# Patient Record
Sex: FEMALE | Race: WHITE | Employment: OTHER | URBAN - METROPOLITAN AREA
[De-identification: names, ages, dates, MRNs, and addresses within clinical notes are randomized per-mention and may not be internally consistent; named-entity substitution may affect disease eponyms.]

---

## 2017-08-03 ENCOUNTER — HOSPITAL ENCOUNTER (OUTPATIENT)
Dept: CT IMAGING | Age: 82
Discharge: HOME | End: 2017-08-03
Attending: PSYCHIATRY & NEUROLOGY | Admitting: PSYCHIATRY & NEUROLOGY

## 2017-08-03 DIAGNOSIS — G45.8 OTHER SPECIFIED TRANSIENT CEREBRAL ISCHEMIAS: ICD-10-CM

## 2017-08-03 DIAGNOSIS — R90.89 OTHER ABNORMAL FINDINGS ON DIAGNOSTIC IMAGING OF CENTRAL NERVOUS SYSTEM: ICD-10-CM

## 2017-08-03 LAB
A/G RATIO: 1.2 (ref 1.1–2.2)
ALBUMIN SERPL-MCNC: 4.1 G/DL (ref 3.4–5)
ALP BLD-CCNC: 46 U/L (ref 40–129)
ALT SERPL-CCNC: 13 U/L (ref 10–40)
ANION GAP SERPL CALCULATED.3IONS-SCNC: 12 MMOL/L (ref 3–16)
AST SERPL-CCNC: 18 U/L (ref 15–37)
BILIRUB SERPL-MCNC: 0.5 MG/DL (ref 0–1)
BUN BLDV-MCNC: 20 MG/DL (ref 7–20)
CALCIUM SERPL-MCNC: 9.5 MG/DL (ref 8.3–10.6)
CHLORIDE BLD-SCNC: 101 MMOL/L (ref 99–110)
CO2: 26 MMOL/L (ref 21–32)
CREAT SERPL-MCNC: 0.8 MG/DL (ref 0.6–1.2)
GFR AFRICAN AMERICAN: >60
GFR NON-AFRICAN AMERICAN: >60
GLOBULIN: 3.3 G/DL
GLUCOSE BLD-MCNC: 103 MG/DL (ref 70–99)
POTASSIUM SERPL-SCNC: 4.9 MMOL/L (ref 3.5–5.1)
SODIUM BLD-SCNC: 139 MMOL/L (ref 136–145)
TOTAL PROTEIN: 7.4 G/DL (ref 6.4–8.2)

## 2017-10-04 LAB — CA 125: 37.9 U/ML (ref 0–35)

## 2017-11-27 ENCOUNTER — HOSPITAL ENCOUNTER (OUTPATIENT)
Dept: PREADMISSION TESTING | Age: 82
Discharge: HOME | End: 2017-11-27

## 2017-11-27 VITALS — WEIGHT: 128 LBS | HEIGHT: 66 IN | BODY MASS INDEX: 20.57 KG/M2

## 2017-11-27 LAB
A/G RATIO: 1.2 (ref 1.1–2.2)
ABO/RH: NORMAL
ALBUMIN SERPL-MCNC: 4.2 G/DL (ref 3.4–5)
ALP BLD-CCNC: 53 U/L (ref 40–129)
ALT SERPL-CCNC: 18 U/L (ref 10–40)
ANION GAP SERPL CALCULATED.3IONS-SCNC: 9 MMOL/L (ref 3–16)
ANTIBODY SCREEN: NORMAL
AST SERPL-CCNC: 24 U/L (ref 15–37)
BASOPHILS ABSOLUTE: 0.1 K/UL (ref 0–0.2)
BASOPHILS RELATIVE PERCENT: 1 %
BILIRUB SERPL-MCNC: 0.5 MG/DL (ref 0–1)
BILIRUBIN URINE: NEGATIVE
BLOOD, URINE: NEGATIVE
BUN BLDV-MCNC: 18 MG/DL (ref 7–20)
CA 125: 10.4 U/ML (ref 0–35)
CALCIUM SERPL-MCNC: 9.6 MG/DL (ref 8.3–10.6)
CHLORIDE BLD-SCNC: 103 MMOL/L (ref 99–110)
CLARITY: ABNORMAL
CO2: 32 MMOL/L (ref 21–32)
COLOR: YELLOW
COMMENT UA: NORMAL
CREAT SERPL-MCNC: 0.8 MG/DL (ref 0.6–1.2)
EOSINOPHILS ABSOLUTE: 0.2 K/UL (ref 0–0.6)
EOSINOPHILS RELATIVE PERCENT: 2.8 %
EPITHELIAL CELLS, UA: 1 /HPF (ref 0–5)
GFR AFRICAN AMERICAN: >60
GFR NON-AFRICAN AMERICAN: >60
GLOBULIN: 3.6 G/DL
GLUCOSE BLD-MCNC: 108 MG/DL (ref 70–99)
GLUCOSE URINE: NEGATIVE MG/DL
HCT VFR BLD CALC: 44.4 % (ref 36–48)
HEMOGLOBIN: 14.4 G/DL (ref 12–16)
HYALINE CASTS: 1 /LPF (ref 0–8)
KETONES, URINE: NEGATIVE MG/DL
LEUKOCYTE ESTERASE, URINE: NEGATIVE
LYMPHOCYTES ABSOLUTE: 1 K/UL (ref 1–5.1)
LYMPHOCYTES RELATIVE PERCENT: 16 %
MCH RBC QN AUTO: 29.8 PG (ref 26–34)
MCHC RBC AUTO-ENTMCNC: 32.5 G/DL (ref 31–36)
MCV RBC AUTO: 91.7 FL (ref 80–100)
MICROSCOPIC EXAMINATION: YES
MONOCYTES ABSOLUTE: 0.6 K/UL (ref 0–1.3)
MONOCYTES RELATIVE PERCENT: 9.9 %
NEUTROPHILS ABSOLUTE: 4.6 K/UL (ref 1.7–7.7)
NEUTROPHILS RELATIVE PERCENT: 70.3 %
NITRITE, URINE: NEGATIVE
PDW BLD-RTO: 14.4 % (ref 12.4–15.4)
PH UA: 7
PLATELET # BLD: 209 K/UL (ref 135–450)
PMV BLD AUTO: 9.6 FL (ref 5–10.5)
POTASSIUM SERPL-SCNC: 5 MMOL/L (ref 3.5–5.1)
PROTEIN UA: NEGATIVE MG/DL
RBC # BLD: 4.84 M/UL (ref 4–5.2)
RBC UA: 1 /HPF (ref 0–4)
SODIUM BLD-SCNC: 144 MMOL/L (ref 136–145)
SPECIFIC GRAVITY UA: 1.02
TOTAL PROTEIN: 7.8 G/DL (ref 6.4–8.2)
URINE TYPE: ABNORMAL
UROBILINOGEN, URINE: 0.2 E.U./DL
WBC # BLD: 6.5 K/UL (ref 4–11)
WBC UA: 1 /HPF (ref 0–5)

## 2017-11-27 RX ORDER — MELATONIN
NIGHTLY
COMMUNITY

## 2017-11-27 RX ORDER — LIDOCAINE HYDROCHLORIDE 10 MG/ML
0.5 INJECTION, SOLUTION EPIDURAL; INFILTRATION; INTRACAUDAL; PERINEURAL ONCE
Status: CANCELLED | OUTPATIENT
Start: 2017-12-11

## 2017-11-27 RX ORDER — SODIUM CHLORIDE, SODIUM LACTATE, POTASSIUM CHLORIDE, CALCIUM CHLORIDE 600; 310; 30; 20 MG/100ML; MG/100ML; MG/100ML; MG/100ML
INJECTION, SOLUTION INTRAVENOUS CONTINUOUS
Status: CANCELLED | OUTPATIENT
Start: 2017-12-11

## 2017-11-27 NOTE — PRE-PROCEDURE INSTRUCTIONS
Name_______________________________________Printed:____________________  Date and time of pfvuqad_____[id number]              __Arrival Time:__0700 main__   1. Do not eat or drink anything after 12 midnight (or____hours) prior to surgery. This includes no water, chewing gum or mints. Endoscopy patients follow your doctors bowel prep instructions,which may include taking part of prep after midnight. 2. Take the following pills with a small sip of water on the morning of surgery____________   3. Aspirin, Ibuprofen, Advil, Naproxen, Vitamin E and other Anti-inflammatory products should be stopped for 5 days before surgery or as directed by your physician. 4. Check with your Doctor regarding stopping Plavix, Coumadin,Eliquis, Lovenox,Effient,Pradaxa,Xarelto, Fragmin or other blood thinners and follow their instructions. 5. Do not smoke, and do not drink any alcoholic beverages 24 hours prior to surgery. This includes NA Beer. 6. You may brush your teeth and gargle the morning of surgery. DO NOT SWALLOW WATER   7. You MUST make arrangements for a responsible adult to stay on site while you are here and take you home after your surgery. You will not be allowed to leave alone or drive yourself home. It is strongly suggested someone stay with you the first 24 hrs. Your surgery will be cancelled if you do not have a ride home. 8. A parent/legal guardian must accompany a child scheduled for surgery and plan to stay at the hospital until the child is discharged. Please do not bring other children with you. 9. Please wear simple, loose fitting clothing to the hospital.  Anh Estefanía not bring valuables (money, credit cards, checkbooks, etc.) Do not wear any makeup (including no eye makeup) or nail polish on your fingers or toes. 10. DO NOT wear any jewelry or piercings on day of surgery. All body piercing jewelry must be removed.              11. If you have ___dentures, they will be removed before going to the OR; we will provide you a container. If you wear ___contact lenses or ___glasses, they will be removed; please bring a case for them. 12. Please see your family doctor/pediatrician for a history & physical and/or concerning medications. Bring any test results/reports from your physician's office. PCP__________________Phone___________H&P Appt. Date________             13 If you  have a Living Will and Durable Power of  for Healthcare, please bring in a copy. 15. Notify your Surgeon if you develop any illness between now and surgery  time, cough, cold, fever, sore throat, nausea, vomiting, etc.  Please notify your surgeon if you experience dizziness, shortness of breath or blurred vision between now & the time of your surgery             15. DO NOT shave your operative site 96 hours prior to surgery. For face & neck surgery, men may use an electric razor 48 hours prior to surgery. 16. Shower the night before surgery with _x__Antibacterial soap __x_Hibiclens             17. To provide excellent care visitors will be limited to one in the room at any given time. 18.  Please bring picture ID and insurance card. 19.  Visit our web site for additional information:  [email or web address]              20.During flu season no children under the age of 15 are permitted in the hospital for the safety of all patients. 21. If you take a long acting insulin in the evening only  take half of your usual  dose the night  before your procedure              22. If you use a c-pap please bring DOS if staying overnight,             23.For your convenience [facility] has a pharmacy on site to fill your prescriptions. 24. If you use oxygen and have a portable tank please bring it  with you the DOS             25. Bring a complete list of all your medications with name and dose include any supplements.              26.

## 2017-12-11 PROBLEM — N84.0 UTERINE POLYP: Status: ACTIVE | Noted: 2017-12-11

## 2017-12-11 PROBLEM — N95.0 PMB (POSTMENOPAUSAL BLEEDING): Status: ACTIVE | Noted: 2017-12-11

## 2017-12-11 PROBLEM — N83.209 OVARIAN CYST: Status: ACTIVE | Noted: 2017-12-11

## 2018-03-14 ENCOUNTER — TELEPHONE (OUTPATIENT)
Dept: FAMILY MEDICINE CLINIC | Age: 83
End: 2018-03-14

## 2018-03-14 NOTE — TELEPHONE ENCOUNTER
Pt's spouse was a pt of Dr Rio Child - her primary care is in [place] and is taking an extended leave of absence - she does not want to drive to [place] anymore - spouse had seen Dayton Martínez and she was impressed with her    Would it be possible for her to see Dayton Martínez now?     Please advise

## 2018-04-06 ENCOUNTER — OFFICE VISIT (OUTPATIENT)
Dept: FAMILY MEDICINE CLINIC | Age: 83
End: 2018-04-06

## 2018-04-06 VITALS
HEIGHT: 66 IN | BODY MASS INDEX: 21.31 KG/M2 | HEART RATE: 94 BPM | OXYGEN SATURATION: 98 % | DIASTOLIC BLOOD PRESSURE: 70 MMHG | SYSTOLIC BLOOD PRESSURE: 140 MMHG | WEIGHT: 132.6 LBS

## 2018-04-06 DIAGNOSIS — E78.2 MIXED HYPERLIPIDEMIA: Primary | ICD-10-CM

## 2018-04-06 PROBLEM — N95.0 PMB (POSTMENOPAUSAL BLEEDING): Status: RESOLVED | Noted: 2017-12-11 | Resolved: 2018-04-06

## 2018-04-06 PROBLEM — N83.209 OVARIAN CYST: Status: RESOLVED | Noted: 2017-12-11 | Resolved: 2018-04-06

## 2018-04-06 PROBLEM — N84.0 UTERINE POLYP: Status: RESOLVED | Noted: 2017-12-11 | Resolved: 2018-04-06

## 2018-04-06 PROCEDURE — 99204 OFFICE O/P NEW MOD 45 MIN: CPT | Performed by: PHYSICIAN ASSISTANT

## 2018-04-06 ASSESSMENT — ENCOUNTER SYMPTOMS
VOICE CHANGE: 0
ABDOMINAL PAIN: 0
SHORTNESS OF BREATH: 0
SORE THROAT: 0
CONSTIPATION: 0
DIARRHEA: 0
EYE PAIN: 0
CHEST TIGHTNESS: 0
BACK PAIN: 0
TROUBLE SWALLOWING: 0
COUGH: 0

## 2018-04-06 ASSESSMENT — PATIENT HEALTH QUESTIONNAIRE - PHQ9
1. LITTLE INTEREST OR PLEASURE IN DOING THINGS: 1
2. FEELING DOWN, DEPRESSED OR HOPELESS: 1
SUM OF ALL RESPONSES TO PHQ QUESTIONS 1-9: 2
SUM OF ALL RESPONSES TO PHQ9 QUESTIONS 1 & 2: 2

## 2018-04-23 ENCOUNTER — TELEPHONE (OUTPATIENT)
Dept: FAMILY MEDICINE CLINIC | Age: 83
End: 2018-04-23

## 2018-04-23 DIAGNOSIS — I10 ESSENTIAL HYPERTENSION: Primary | ICD-10-CM

## 2018-04-23 RX ORDER — LISINOPRIL 10 MG/1
10 TABLET ORAL DAILY
Qty: 30 TABLET | Refills: 3 | Status: SHIPPED | OUTPATIENT
Start: 2018-04-23 | End: 2018-08-20 | Stop reason: SDUPTHER

## 2018-05-16 ENCOUNTER — NURSE ONLY (OUTPATIENT)
Dept: FAMILY MEDICINE CLINIC | Age: 83
End: 2018-05-16

## 2018-05-16 VITALS — SYSTOLIC BLOOD PRESSURE: 143 MMHG | DIASTOLIC BLOOD PRESSURE: 50 MMHG | HEART RATE: 59 BPM

## 2018-05-17 ENCOUNTER — TELEPHONE (OUTPATIENT)
Dept: FAMILY MEDICINE CLINIC | Age: 83
End: 2018-05-17

## 2018-05-17 RX ORDER — AMLODIPINE BESYLATE 5 MG/1
5 TABLET ORAL DAILY
Qty: 30 TABLET | Refills: 5 | Status: SHIPPED | OUTPATIENT
Start: 2018-05-17 | End: 2018-09-28

## 2018-07-03 ENCOUNTER — OFFICE VISIT (OUTPATIENT)
Dept: FAMILY MEDICINE CLINIC | Age: 83
End: 2018-07-03

## 2018-07-03 VITALS
TEMPERATURE: 98 F | HEART RATE: 76 BPM | WEIGHT: 136 LBS | SYSTOLIC BLOOD PRESSURE: 124 MMHG | DIASTOLIC BLOOD PRESSURE: 54 MMHG | OXYGEN SATURATION: 98 % | BODY MASS INDEX: 21.95 KG/M2

## 2018-07-03 DIAGNOSIS — N30.00 ACUTE CYSTITIS WITHOUT HEMATURIA: Primary | ICD-10-CM

## 2018-07-03 DIAGNOSIS — K14.6 TONGUE PAIN: ICD-10-CM

## 2018-07-03 LAB
BACTERIA URINE, POC: 0
BILIRUBIN URINE: 1 MG/DL
BLOOD, URINE: POSITIVE
CASTS URINE, POC: 0
CLARITY: CLEAR
COLOR: ABNORMAL
CRYSTALS URINE, POC: 0
EPI CELLS URINE, POC: 0
GLUCOSE URINE: NEGATIVE
KETONES, URINE: POSITIVE
LEUKOCYTE EST, POC: NEGATIVE
NITRITE, URINE: NEGATIVE
PH UA: 5 (ref 4.5–8)
PROTEIN UA: POSITIVE
RBC URINE, POC: ABNORMAL
SPECIFIC GRAVITY UA: 1.02 (ref 1–1.03)
UROBILINOGEN, URINE: NORMAL
WBC URINE, POC: 0
YEAST URINE, POC: 0

## 2018-07-03 PROCEDURE — 81000 URINALYSIS NONAUTO W/SCOPE: CPT | Performed by: FAMILY MEDICINE

## 2018-07-03 PROCEDURE — 99213 OFFICE O/P EST LOW 20 MIN: CPT | Performed by: FAMILY MEDICINE

## 2018-07-03 RX ORDER — CIPROFLOXACIN 500 MG/1
500 TABLET, FILM COATED ORAL 2 TIMES DAILY
Qty: 10 TABLET | Refills: 0 | Status: SHIPPED | OUTPATIENT
Start: 2018-07-03 | End: 2018-07-08

## 2018-07-05 LAB — URINE CULTURE, ROUTINE: NORMAL

## 2018-07-06 ENCOUNTER — TELEPHONE (OUTPATIENT)
Dept: FAMILY MEDICINE CLINIC | Age: 83
End: 2018-07-06

## 2018-07-06 NOTE — TELEPHONE ENCOUNTER
Pt's daughter states pt is still not herself -  she does not feel right \"strange in her brain\"  She is having hip problems but will not take any tylenol does have appt with Ortho on Monday - she has bursitis in her hip. Pt's spouse passed away last September and daughter thinks pt has not really moved on from his death but is not sure she wants to.       Will call after her ortho appt to advise how she is feeling as it is possible this is all due to her hip

## 2018-07-10 NOTE — TELEPHONE ENCOUNTER
Patient states that since she stopped the amlodipine she feels a lot better, her bp :    Sunday   110/61  Monday   125/52  Tuesday  146/66   she has an appt this week and will discuss more at the appointment time.

## 2018-07-13 ENCOUNTER — OFFICE VISIT (OUTPATIENT)
Dept: FAMILY MEDICINE CLINIC | Age: 83
End: 2018-07-13

## 2018-07-13 VITALS
DIASTOLIC BLOOD PRESSURE: 70 MMHG | HEART RATE: 78 BPM | SYSTOLIC BLOOD PRESSURE: 140 MMHG | HEIGHT: 65 IN | BODY MASS INDEX: 21.43 KG/M2 | WEIGHT: 128.6 LBS | OXYGEN SATURATION: 98 %

## 2018-07-13 DIAGNOSIS — R03.0 TRANSIENT ELEVATED BLOOD PRESSURE: Primary | ICD-10-CM

## 2018-07-13 DIAGNOSIS — F32.A DEPRESSION, UNSPECIFIED DEPRESSION TYPE: ICD-10-CM

## 2018-07-13 PROBLEM — N30.00 ACUTE CYSTITIS WITHOUT HEMATURIA: Status: RESOLVED | Noted: 2018-07-03 | Resolved: 2018-07-13

## 2018-07-13 PROBLEM — K14.6 TONGUE PAIN: Status: RESOLVED | Noted: 2018-07-03 | Resolved: 2018-07-13

## 2018-07-13 PROCEDURE — 99213 OFFICE O/P EST LOW 20 MIN: CPT | Performed by: PHYSICIAN ASSISTANT

## 2018-07-13 ASSESSMENT — ENCOUNTER SYMPTOMS
SHORTNESS OF BREATH: 0
ABDOMINAL PAIN: 0
COUGH: 0
BACK PAIN: 0

## 2018-07-13 NOTE — PATIENT INSTRUCTIONS
Vimal Angel was seen today for follow-up. Diagnoses and all orders for this visit:    Transient elevated blood pressure    Depression, unspecified depression type       Continue monitoring BP readings daily and let me know if BP readings stay elevated, >150/90 consistently.

## 2018-07-13 NOTE — PROGRESS NOTES
Subjective:      Patient ID: Ariela Correa is a 80 y.o. female. HPI  Patient is here today to discuss some medications lately. She was in here on 7/3 with confusion, excessive thirst, dry mouth, frequent urination. She has a mild UTI and took the antibiotic and finished it. Urine culture was negative. Then her daughter called the neurologist and they didn't feel it was an issue for them. Her norvasc is newer for her. She has not had the norvasc since 7/5, she has improved somewhat since Monday. She was crying and not making sense on Monday. Her daughter says she has definitely noticed an improvement since stopping the Norvasc. She is also suffering from right hip pain that she is seeing ortho for. She got a steroid injection 4 days ago. Review of Systems   Constitutional: Negative for fatigue and unexpected weight change. HENT: Negative for nosebleeds. Eyes: Negative for visual disturbance. Respiratory: Negative for cough and shortness of breath. Cardiovascular: Negative for chest pain, palpitations and leg swelling. Gastrointestinal: Negative for abdominal pain. Genitourinary: Negative for difficulty urinating. Musculoskeletal: Positive for arthralgias (right hip pain). Negative for back pain. Neurological: Negative for dizziness and headaches. Psychiatric/Behavioral: Negative for confusion and sleep disturbance. The patient is nervous/anxious. Depression       Objective:   Physical Exam   Constitutional: She is oriented to person, place, and time. She appears well-developed and well-nourished. She is cooperative. Slightly elevated BP   Cardiovascular: Normal rate, regular rhythm and normal heart sounds. Pulmonary/Chest: Effort normal and breath sounds normal. No respiratory distress. She has no decreased breath sounds. Neurological: She is alert and oriented to person, place, and time.    Psychiatric: Her speech is normal and behavior is normal. Thought

## 2018-08-20 DIAGNOSIS — I10 ESSENTIAL HYPERTENSION: ICD-10-CM

## 2018-08-21 RX ORDER — LISINOPRIL 10 MG/1
10 TABLET ORAL DAILY
Qty: 30 TABLET | Refills: 3 | Status: SHIPPED | OUTPATIENT
Start: 2018-08-21 | End: 2018-09-28 | Stop reason: SDUPTHER

## 2018-08-22 ENCOUNTER — HOSPITAL ENCOUNTER (OUTPATIENT)
Dept: GENERAL RADIOLOGY | Age: 83
Discharge: HOME | End: 2018-08-22
Payer: COMMERCIAL

## 2018-08-22 DIAGNOSIS — M16.11 ARTHRITIS OF RIGHT HIP: ICD-10-CM

## 2018-08-22 PROCEDURE — 73525 CONTRAST X-RAY OF HIP: CPT

## 2018-08-22 PROCEDURE — 6360000004 HC RX CONTRAST MEDICATION: Performed by: ORTHOPAEDIC SURGERY

## 2018-08-22 PROCEDURE — 2500000003 HC RX 250 WO HCPCS: Performed by: ORTHOPAEDIC SURGERY

## 2018-08-22 PROCEDURE — 6360000002 HC RX W HCPCS: Performed by: ORTHOPAEDIC SURGERY

## 2018-08-22 RX ORDER — BUPIVACAINE HYDROCHLORIDE 2.5 MG/ML
3 INJECTION, SOLUTION EPIDURAL; INFILTRATION; INTRACAUDAL ONCE
Status: DISCONTINUED | OUTPATIENT
Start: 2018-08-22 | End: 2018-08-22

## 2018-08-22 RX ORDER — TRIAMCINOLONE ACETONIDE 40 MG/ML
80 INJECTION, SUSPENSION INTRA-ARTICULAR; INTRAMUSCULAR ONCE
Status: COMPLETED | OUTPATIENT
Start: 2018-08-22 | End: 2018-08-22

## 2018-08-22 RX ORDER — BUPIVACAINE HYDROCHLORIDE 2.5 MG/ML
3 INJECTION, SOLUTION EPIDURAL; INFILTRATION; INTRACAUDAL ONCE
Status: COMPLETED | OUTPATIENT
Start: 2018-08-22 | End: 2018-08-22

## 2018-08-22 RX ORDER — LIDOCAINE HYDROCHLORIDE 10 MG/ML
5 INJECTION, SOLUTION INFILTRATION; PERINEURAL ONCE
Status: COMPLETED | OUTPATIENT
Start: 2018-08-22 | End: 2018-08-22

## 2018-08-22 RX ADMIN — IOVERSOL 10 ML: 636 INJECTION INTRA-ARTERIAL; INTRAVENOUS at 15:15

## 2018-08-22 RX ADMIN — LIDOCAINE HYDROCHLORIDE 5 ML: 10 INJECTION, SOLUTION INFILTRATION; PERINEURAL at 15:37

## 2018-08-22 RX ADMIN — BUPIVACAINE HYDROCHLORIDE 7.5 MG: 2.5 INJECTION, SOLUTION EPIDURAL; INFILTRATION; INTRACAUDAL at 15:30

## 2018-08-22 RX ADMIN — SODIUM BICARBONATE 1.44 MEQ: 0.2 INJECTION, SOLUTION INTRAVENOUS at 15:39

## 2018-08-22 RX ADMIN — TRIAMCINOLONE ACETONIDE 80 MG: 40 INJECTION, SUSPENSION INTRA-ARTICULAR; INTRAMUSCULAR at 15:30

## 2018-09-28 ENCOUNTER — OFFICE VISIT (OUTPATIENT)
Dept: FAMILY MEDICINE CLINIC | Age: 83
End: 2018-09-28
Payer: COMMERCIAL

## 2018-09-28 VITALS
WEIGHT: 130 LBS | DIASTOLIC BLOOD PRESSURE: 70 MMHG | BODY MASS INDEX: 20.98 KG/M2 | OXYGEN SATURATION: 95 % | HEART RATE: 78 BPM | SYSTOLIC BLOOD PRESSURE: 160 MMHG

## 2018-09-28 DIAGNOSIS — I10 ESSENTIAL HYPERTENSION: ICD-10-CM

## 2018-09-28 DIAGNOSIS — E78.2 MIXED HYPERLIPIDEMIA: Primary | ICD-10-CM

## 2018-09-28 PROCEDURE — 99213 OFFICE O/P EST LOW 20 MIN: CPT | Performed by: PHYSICIAN ASSISTANT

## 2018-09-28 RX ORDER — LISINOPRIL 20 MG/1
20 TABLET ORAL DAILY
Qty: 30 TABLET | Refills: 5 | Status: SHIPPED | OUTPATIENT
Start: 2018-09-28 | End: 2019-02-07 | Stop reason: SDUPTHER

## 2018-09-28 RX ORDER — LISINOPRIL 10 MG/1
10 TABLET ORAL DAILY
Qty: 30 TABLET | Refills: 3 | Status: CANCELLED | OUTPATIENT
Start: 2018-09-28

## 2018-09-28 RX ORDER — SIMVASTATIN 10 MG
10 TABLET ORAL NIGHTLY
Qty: 30 TABLET | Refills: 5 | Status: SHIPPED | OUTPATIENT
Start: 2018-09-28 | End: 2019-02-07 | Stop reason: SDUPTHER

## 2018-09-28 ASSESSMENT — ENCOUNTER SYMPTOMS
COUGH: 0
BACK PAIN: 0
SHORTNESS OF BREATH: 0
ABDOMINAL PAIN: 0

## 2018-09-28 NOTE — PROGRESS NOTES
Subjective:      Patient ID: London Sommer is a 80 y.o. female. HPI  Patient is here today because her BP has been elevated for the last month. She said usually it is fine. But she is really stressed out lately getting ready to go to [facility] to see her family. She checks her BP every night at 11:15pm. No chest pain, SOB, dizziness, HA. She sees her cardiologist yearly. It has been 140's-150's/60-70. Review of Systems   Constitutional: Negative for fatigue and unexpected weight change. HENT: Negative for nosebleeds. Eyes: Negative for visual disturbance. Respiratory: Negative for cough and shortness of breath. Cardiovascular: Negative for chest pain, palpitations and leg swelling. Gastrointestinal: Negative for abdominal pain. Musculoskeletal: Negative for back pain. Neurological: Negative for dizziness and headaches. Objective:   Physical Exam   Constitutional: She is oriented to person, place, and time. She appears well-developed and well-nourished. She is cooperative. High BP   Cardiovascular: Normal rate, regular rhythm and normal heart sounds. Pulmonary/Chest: Effort normal and breath sounds normal. No respiratory distress. She has no decreased breath sounds. Neurological: She is alert and oriented to person, place, and time. Assessment:      Shalonda Thorne was seen today for follow-up. Diagnoses and all orders for this visit:    Mixed hyperlipidemia  -     simvastatin (ZOCOR) 10 MG tablet; Take 1 tablet by mouth nightly    Essential hypertension  -     lisinopril (PRINIVIL;ZESTRIL) 20 MG tablet; Take 1 tablet by mouth daily    Other orders  -     Cancel: lisinopril (PRINIVIL;ZESTRIL) 10 MG tablet; Take 1 tablet by mouth daily             Plan:      Increase Lisinopril to 20mg, call with any concerns.          Yo Roberts

## 2018-09-28 NOTE — PATIENT INSTRUCTIONS
Mitul Sanchez was seen today for follow-up. Diagnoses and all orders for this visit:    Mixed hyperlipidemia  -     simvastatin (ZOCOR) 10 MG tablet; Take 1 tablet by mouth nightly    Essential hypertension  -     lisinopril (PRINIVIL;ZESTRIL) 20 MG tablet; Take 1 tablet by mouth daily    Other orders  -     Cancel: lisinopril (PRINIVIL;ZESTRIL) 10 MG tablet; Take 1 tablet by mouth daily       Increase lisinopril to 20mg daily. Call next week if your BP is getting too low.

## 2018-10-05 ENCOUNTER — TELEPHONE (OUTPATIENT)
Dept: FAMILY MEDICINE CLINIC | Age: 83
End: 2018-10-05

## 2019-02-07 ENCOUNTER — OFFICE VISIT (OUTPATIENT)
Dept: FAMILY MEDICINE CLINIC | Age: 84
End: 2019-02-07
Payer: COMMERCIAL

## 2019-02-07 VITALS
SYSTOLIC BLOOD PRESSURE: 122 MMHG | TEMPERATURE: 99.2 F | HEART RATE: 61 BPM | BODY MASS INDEX: 21.66 KG/M2 | WEIGHT: 134.2 LBS | DIASTOLIC BLOOD PRESSURE: 68 MMHG | OXYGEN SATURATION: 94 %

## 2019-02-07 DIAGNOSIS — I10 ESSENTIAL HYPERTENSION: ICD-10-CM

## 2019-02-07 DIAGNOSIS — N39.0 URINARY TRACT INFECTION WITHOUT HEMATURIA, SITE UNSPECIFIED: Primary | ICD-10-CM

## 2019-02-07 DIAGNOSIS — E78.2 MIXED HYPERLIPIDEMIA: ICD-10-CM

## 2019-02-07 LAB
BACTERIA URINE, POC: ABNORMAL
BILIRUBIN URINE: 0 MG/DL
BLOOD, URINE: POSITIVE
CASTS URINE, POC: 0
CLARITY: CLEAR
COLOR: YELLOW
CRYSTALS URINE, POC: 0
EPI CELLS URINE, POC: 0
GLUCOSE URINE: NEGATIVE
KETONES, URINE: NEGATIVE
LEUKOCYTE EST, POC: ABNORMAL
NITRITE, URINE: POSITIVE
PH UA: 6.5 (ref 4.5–8)
PROTEIN UA: POSITIVE
RBC URINE, POC: ABNORMAL
SPECIFIC GRAVITY UA: 1.03 (ref 1–1.03)
UROBILINOGEN, URINE: NORMAL
WBC URINE, POC: ABNORMAL
YEAST URINE, POC: 0

## 2019-02-07 PROCEDURE — 99213 OFFICE O/P EST LOW 20 MIN: CPT | Performed by: FAMILY MEDICINE

## 2019-02-07 PROCEDURE — 81000 URINALYSIS NONAUTO W/SCOPE: CPT | Performed by: FAMILY MEDICINE

## 2019-02-07 RX ORDER — LISINOPRIL 20 MG/1
20 TABLET ORAL DAILY
Qty: 30 TABLET | Refills: 5 | Status: ON HOLD | OUTPATIENT
Start: 2019-02-07 | End: 2019-05-01 | Stop reason: CLARIF

## 2019-02-07 RX ORDER — SULFAMETHOXAZOLE AND TRIMETHOPRIM 800; 160 MG/1; MG/1
1 TABLET ORAL 2 TIMES DAILY
Qty: 10 TABLET | Refills: 0 | Status: SHIPPED | OUTPATIENT
Start: 2019-02-07 | End: 2019-02-12

## 2019-02-07 RX ORDER — NITROFURANTOIN 25; 75 MG/1; MG/1
100 CAPSULE ORAL 2 TIMES DAILY
Qty: 14 CAPSULE | Refills: 0 | Status: SHIPPED | OUTPATIENT
Start: 2019-02-07 | End: 2019-02-07

## 2019-02-07 RX ORDER — SIMVASTATIN 10 MG
10 TABLET ORAL NIGHTLY
Qty: 30 TABLET | Refills: 5 | Status: SHIPPED | OUTPATIENT
Start: 2019-02-07 | End: 2019-09-27 | Stop reason: SDUPTHER

## 2019-02-07 ASSESSMENT — PATIENT HEALTH QUESTIONNAIRE - PHQ9
2. FEELING DOWN, DEPRESSED OR HOPELESS: 1
SUM OF ALL RESPONSES TO PHQ9 QUESTIONS 1 & 2: 2
SUM OF ALL RESPONSES TO PHQ QUESTIONS 1-9: 2
1. LITTLE INTEREST OR PLEASURE IN DOING THINGS: 1
SUM OF ALL RESPONSES TO PHQ QUESTIONS 1-9: 2

## 2019-02-07 ASSESSMENT — ENCOUNTER SYMPTOMS: ABDOMINAL PAIN: 0

## 2019-02-10 LAB
ORGANISM: ABNORMAL
URINE CULTURE, ROUTINE: ABNORMAL
URINE CULTURE, ROUTINE: ABNORMAL

## 2019-02-18 ENCOUNTER — OFFICE VISIT (OUTPATIENT)
Dept: ORTHOPEDIC SURGERY | Age: 84
End: 2019-02-18
Payer: COMMERCIAL

## 2019-02-18 VITALS — WEIGHT: 130 LBS | BODY MASS INDEX: 20.89 KG/M2 | HEIGHT: 66 IN

## 2019-02-18 DIAGNOSIS — M16.11 ARTHRITIS OF RIGHT HIP: Primary | ICD-10-CM

## 2019-02-18 DIAGNOSIS — M25.551 PAIN OF RIGHT HIP JOINT: ICD-10-CM

## 2019-02-18 PROCEDURE — 99213 OFFICE O/P EST LOW 20 MIN: CPT | Performed by: ORTHOPAEDIC SURGERY

## 2019-02-25 ENCOUNTER — HOSPITAL ENCOUNTER (OUTPATIENT)
Dept: GENERAL RADIOLOGY | Age: 84
Discharge: HOME | End: 2019-02-25
Payer: MEDICARE

## 2019-02-25 DIAGNOSIS — M16.11 OSTEOARTHRITIS OF RIGHT HIP, UNSPECIFIED OSTEOARTHRITIS TYPE: ICD-10-CM

## 2019-02-25 PROCEDURE — 2500000003 HC RX 250 WO HCPCS: Performed by: ORTHOPAEDIC SURGERY

## 2019-02-25 PROCEDURE — 20610 DRAIN/INJ JOINT/BURSA W/O US: CPT

## 2019-02-25 PROCEDURE — 6360000002 HC RX W HCPCS: Performed by: ORTHOPAEDIC SURGERY

## 2019-02-25 PROCEDURE — 6360000004 HC RX CONTRAST MEDICATION: Performed by: ORTHOPAEDIC SURGERY

## 2019-02-25 RX ORDER — LIDOCAINE HYDROCHLORIDE 10 MG/ML
5 INJECTION, SOLUTION INFILTRATION; PERINEURAL ONCE
Status: COMPLETED | OUTPATIENT
Start: 2019-02-25 | End: 2019-02-25

## 2019-02-25 RX ORDER — BUPIVACAINE HYDROCHLORIDE 2.5 MG/ML
3 INJECTION, SOLUTION EPIDURAL; INFILTRATION; INTRACAUDAL ONCE
Status: COMPLETED | OUTPATIENT
Start: 2019-02-25 | End: 2019-02-25

## 2019-02-25 RX ORDER — TRIAMCINOLONE ACETONIDE 40 MG/ML
80 INJECTION, SUSPENSION INTRA-ARTICULAR; INTRAMUSCULAR ONCE
Status: COMPLETED | OUTPATIENT
Start: 2019-02-25 | End: 2019-02-25

## 2019-02-25 RX ADMIN — IOVERSOL 5 ML: 636 INJECTION INTRA-ARTERIAL; INTRAVENOUS at 15:14

## 2019-02-25 RX ADMIN — TRIAMCINOLONE ACETONIDE 80 MG: 40 INJECTION, SUSPENSION INTRA-ARTICULAR; INTRAMUSCULAR at 15:16

## 2019-02-25 RX ADMIN — BUPIVACAINE HYDROCHLORIDE 7.5 MG: 2.5 INJECTION, SOLUTION EPIDURAL; INFILTRATION; INTRACAUDAL; PERINEURAL at 15:14

## 2019-02-25 RX ADMIN — LIDOCAINE HYDROCHLORIDE 5 ML: 10 INJECTION, SOLUTION EPIDURAL; INFILTRATION; INTRACAUDAL; PERINEURAL at 15:15

## 2019-02-25 ASSESSMENT — PAIN SCALES - GENERAL
PAINLEVEL_OUTOF10: 4
PAINLEVEL_OUTOF10: 5

## 2019-04-11 ENCOUNTER — APPOINTMENT (OUTPATIENT)
Dept: GENERAL RADIOLOGY | Age: 84
End: 2019-04-11
Payer: MEDICARE

## 2019-04-11 ENCOUNTER — APPOINTMENT (OUTPATIENT)
Dept: CT IMAGING | Age: 84
End: 2019-04-11
Payer: MEDICARE

## 2019-04-11 ENCOUNTER — HOSPITAL ENCOUNTER (EMERGENCY)
Age: 84
Discharge: HOME | End: 2019-04-11
Attending: EMERGENCY MEDICINE
Payer: MEDICARE

## 2019-04-11 VITALS
BODY MASS INDEX: 19.37 KG/M2 | TEMPERATURE: 98.5 F | HEART RATE: 95 BPM | WEIGHT: 120 LBS | SYSTOLIC BLOOD PRESSURE: 126 MMHG | DIASTOLIC BLOOD PRESSURE: 61 MMHG | RESPIRATION RATE: 16 BRPM | OXYGEN SATURATION: 100 %

## 2019-04-11 DIAGNOSIS — M25.551 RIGHT HIP PAIN: Primary | ICD-10-CM

## 2019-04-11 PROCEDURE — 73502 X-RAY EXAM HIP UNI 2-3 VIEWS: CPT

## 2019-04-11 PROCEDURE — 73700 CT LOWER EXTREMITY W/O DYE: CPT

## 2019-04-11 PROCEDURE — 6370000000 HC RX 637 (ALT 250 FOR IP): Performed by: EMERGENCY MEDICINE

## 2019-04-11 PROCEDURE — 6370000000 HC RX 637 (ALT 250 FOR IP): Performed by: PHYSICIAN ASSISTANT

## 2019-04-11 PROCEDURE — 99283 EMERGENCY DEPT VISIT LOW MDM: CPT

## 2019-04-11 RX ORDER — NAPROXEN 250 MG/1
500 TABLET ORAL ONCE
Status: COMPLETED | OUTPATIENT
Start: 2019-04-11 | End: 2019-04-11

## 2019-04-11 RX ORDER — OXYCODONE HYDROCHLORIDE AND ACETAMINOPHEN 5; 325 MG/1; MG/1
1 TABLET ORAL ONCE
Status: COMPLETED | OUTPATIENT
Start: 2019-04-11 | End: 2019-04-11

## 2019-04-11 RX ORDER — NAPROXEN 500 MG/1
500 TABLET ORAL 2 TIMES DAILY
Qty: 20 TABLET | Refills: 0 | Status: SHIPPED | OUTPATIENT
Start: 2019-04-11 | End: 2019-04-23

## 2019-04-11 RX ORDER — ONDANSETRON 4 MG/1
4 TABLET, ORALLY DISINTEGRATING ORAL ONCE
Status: COMPLETED | OUTPATIENT
Start: 2019-04-11 | End: 2019-04-11

## 2019-04-11 RX ORDER — METHYLPREDNISOLONE 4 MG/1
4 TABLET ORAL SEE ADMIN INSTRUCTIONS
Qty: 1 KIT | Refills: 0 | Status: SHIPPED | OUTPATIENT
Start: 2019-04-12 | End: 2019-04-18

## 2019-04-11 RX ADMIN — OXYCODONE AND ACETAMINOPHEN 1 TABLET: 5; 325 TABLET ORAL at 14:32

## 2019-04-11 RX ADMIN — PREDNISONE 50 MG: 20 TABLET ORAL at 15:12

## 2019-04-11 RX ADMIN — NAPROXEN 500 MG: 250 TABLET ORAL at 15:12

## 2019-04-11 RX ADMIN — ONDANSETRON 4 MG: 4 TABLET, ORALLY DISINTEGRATING ORAL at 14:32

## 2019-04-11 ASSESSMENT — PAIN SCALES - GENERAL
PAINLEVEL_OUTOF10: 7
PAINLEVEL_OUTOF10: 9

## 2019-04-11 ASSESSMENT — ENCOUNTER SYMPTOMS
SHORTNESS OF BREATH: 0
NAUSEA: 0
VOMITING: 0

## 2019-04-11 NOTE — ED PROVIDER NOTES
[address] [facility]  eMERGENCY dEPARTMENT eNCOUnter        Pt Name: Sherryle Lame  MRN: [id number]  Masoudgftrae 1934  Date of evaluation: 4/11/2019  Provider: Mahesh Taylor PA-C  PCP: MOLLY Lin    This patient was seen and evaluated by the attending physician Izzy Amor III, [address]       Chief Complaint   Patient presents with    Hip Pain     Pt reporting chronic pain to R hip x several years, pain worse over last 6 weeks - pt has appt with ortho tomorrow       HISTORY OF PRESENT ILLNESS   (Location/Symptom, Timing/Onset, Context/Setting, Quality, Duration, Modifying Factors, Severity)  Note limiting factors. Sherryle Lame is a 80 y.o. female who presents to the emergency department today for evaluation for right hip pain. The patient states that she's been experiencing chronic pain to her right hip for the past several years but she has been experiencing increasing pain to her right hip and this is been ongoing for over one month. The patient states that she does see Dr. Girma Dodd, orthopedics, she states that she actually has an appointment tomorrow but she states that she has had progressively worsening pain, and she states that she is in too much pain to make it to her appointment tomorrow. She is rating her pain as a 7/10, pain is worse with touch and movement. Patient states that she has difficulty walking around at home secondary to the pain, and she states that she does live alone. Patient denies falling or injuring her hip in any way. She states that over a month ago she did have an injection into her right hip. She denies any fever or chills. She denies any nausea or vomiting. No numbness, tingling or weakness. She has no other complaints. Nursing Notes were all reviewed and agreed with or any disagreements were addressed  in the HPI.     REVIEW OF SYSTEMS    (2-9 systems for level 4, 10 or more for level 5) Review of Systems   Constitutional: Negative for activity change, appetite change, chills and fever. Respiratory: Negative for shortness of breath. Cardiovascular: Negative for chest pain. Gastrointestinal: Negative for nausea and vomiting. Musculoskeletal: Positive for arthralgias. Skin: Negative for wound. Neurological: Negative for weakness and numbness. Positives and Pertinent negatives as per HPI. Except as noted abovein the ROS, all other systems were reviewed and negative. PAST MEDICAL HISTORY     Past Medical History:   Diagnosis Date    Hypercholesteremia          SURGICAL HISTORY     Past Surgical History:   Procedure Laterality Date    APPENDECTOMY      JOINT REPLACEMENT      left    SALPINGO-OOPHORECTOMY  12/11/2017    robotic, and hysteroscopy d&c, polypectomy          CURRENTMEDICATIONS       Previous Medications    ASPIRIN 81 MG TABLET    Take 81 mg by mouth daily. CHOLECALCIFEROL (VITAMIN D3) 1000 UNITS TABS    Take by mouth    LISINOPRIL (PRINIVIL;ZESTRIL) 20 MG TABLET    Take 1 tablet by mouth daily    MAGNESIUM HYDROXIDE (MAGNESIA PO)    Take by mouth    SIMVASTATIN (ZOCOR) 10 MG TABLET    Take 1 tablet by mouth nightly         ALLERGIES     Oxycodone and Norvasc [amlodipine besylate]    FAMILYHISTORY     No family history on file. SOCIAL HISTORY       Social History     Socioeconomic History    Marital status:       Spouse name: Not on file    Number of children: Not on file    Years of education: Not on file    Highest education level: Not on file   Occupational History    Not on file   Social Needs    Financial resource strain: Not on file    Food insecurity:     Worry: Not on file     Inability: Not on file    Transportation needs:     Medical: Not on file     Non-medical: Not on file   Tobacco Use    Smoking status: Never Smoker    Smokeless tobacco: Never Used   Substance and Sexual Activity    Alcohol use: No    Drug use: No    Sexual activity: Not on file   Lifestyle    Physical activity:     Days per week: Not on file     Minutes per session: Not on file    Stress: Not on file   Relationships    Social connections:     Talks on phone: Not on file     Gets together: Not on file     Attends Rastafarian service: Not on file     Active member of club or organization: Not on file     Attends meetings of clubs or organizations: Not on file     Relationship status: Not on file    Intimate partner violence:     Fear of current or ex partner: Not on file     Emotionally abused: Not on file     Physically abused: Not on file     Forced sexual activity: Not on file   Other Topics Concern    Not on file   Social History Narrative    Not on file       SCREENINGS             PHYSICAL EXAM    (up to 7 for level 4, 8 or more for level 5)     ED Triage Vitals [04/11/19 1329]   BP Temp Temp src Pulse Resp SpO2 Height Weight   126/61 98.5 °F (36.9 °C) -- 95 16 100 % -- 120 lb (54.4 kg)       Physical Exam   Constitutional: She is oriented to person, place, and time. She appears well-developed and well-nourished. HENT:   Head: Normocephalic and atraumatic. Right Ear: External ear normal.   Left Ear: External ear normal.   Nose: Nose normal.   Eyes: Right eye exhibits no discharge. Left eye exhibits no discharge. Neck: Normal range of motion. Neck supple. No tracheal deviation present. Cardiovascular: Intact distal pulses. Pulmonary/Chest: Effort normal. No respiratory distress. Musculoskeletal: Normal range of motion. There is diffuse tenderness over the right anterior hip. There is no overlying erythema, edema, ecchymosis or more. She has full range of motion of the hip but does experience pain. Dorsalis pedis and posterior tibialis pulse are 2+. Normal sensation light touch. Neurovascularly intact. Neurological: She is alert and oriented to person, place, and time. Skin: Skin is warm and dry. She is not diaphoretic. Psychiatric: She has a normal mood and affect. Her behavior is normal.   Nursing note and vitals reviewed. DIAGNOSTIC RESULTS   LABS:    Labs Reviewed - No data to display    All other labs were within normal range or not returned as of this dictation. EKG: All EKG's are interpreted by the Emergency Department Physician who either signs orCo-signs this chart in the absence of a cardiologist.  Please see their note for interpretation of EKG. RADIOLOGY:   Non-plain film images such as CT, Ultrasound and MRI are read by the radiologist. Plain radiographic images are visualized andpreliminarily interpreted by the  ED Provider with the below findings:        Interpretation perthe Radiologist below, if available at the time of this note:    CT HIP RIGHT WO CONTRAST   Final Result   Advanced right hip osteoarthrosis. No fracture. XR HIP RIGHT (2-3 VIEWS)   Final Result   Advanced osteoarthritis of the right hip with no acute abnormality   demonstrated           Xr Hip Right (2-3 Views)    Result Date: 4/11/2019  EXAMINATION: 2 XRAY VIEWS OF THE RIGHT HIP 4/11/2019 1:47 pm COMPARISON: None. HISTORY: ORDERING SYSTEM PROVIDED HISTORY: pain TECHNOLOGIST PROVIDED HISTORY: Reason for exam:->pain Ordering Physician Provided Reason for Exam: Hip Pain (Pt reporting chronic pain to R hip x several years, pain worse over last 6 weeks - pt has appt with ortho tomorrow) Acuity: Chronic Type of Exam: Ongoing FINDINGS: No evidence of fracture or dislocation. There is advanced osteoarthritis of the right hip joint with greatest loss of joint space at the superior margin with bone to bone apposition. There is slight flattening of the superior articular surface of the femoral head with subchondral sclerosis. There is osteophyte formation at the femoral head-neck junction. There has been left hip arthroplasty with good alignment of the prosthetic components.   No abnormality of the remainder of the pelvis is demonstrated     Advanced osteoarthritis of the right hip with no acute abnormality demonstrated     Ct Hip Right Wo Contrast    Result Date: 4/11/2019  EXAMINATION: CT OF THE RIGHT HIP WITHOUT CONTRAST 4/11/2019 2:13 pm TECHNIQUE: CT of the right hip was performed without the administration of intravenous contrast.  Multiplanar reformatted images are provided for review. Dose modulation, iterative reconstruction, and/or weight based adjustment of the mA/kV was utilized to reduce the radiation dose to as low as reasonably achievable. COMPARISON: None. HISTORY ORDERING SYSTEM PROVIDED HISTORY: pain TECHNOLOGIST PROVIDED HISTORY: Ordering Physician Provided Reason for Exam: pain Acuity: Acute Type of Exam: Initial FINDINGS: Bones: No acute fracture. Left hip arthroplasty. No evidence of dislocation. No findings to suggest hardware loosening. Soft Tissue:  Appendix is normal.  Colonic diverticulosis. No acute diverticulitis. No intrapelvic fluid collection. No soft tissue fluid collection. Joint:  Advanced right hip osteoarthrosis. Severe joint space narrowing and subchondral cystic changes as well as sclerosis with prominent marginal osteophytes. Advanced right hip osteoarthrosis. No fracture.          PROCEDURES   Unless otherwise noted below, none     Procedures    CRITICAL CARE TIME   N/A    CONSULTS:  IP CONSULT TO SOCIAL WORK      EMERGENCY DEPARTMENT COURSE and DIFFERENTIALDIAGNOSIS/MDM:   Vitals:    Vitals:    04/11/19 1329   BP: 126/61   Pulse: 95   Resp: 16   Temp: 98.5 °F (36.9 °C)   SpO2: 100%   Weight: 120 lb (54.4 kg)       Patient was given thefollowing medications:  Medications   oxyCODONE-acetaminophen (PERCOCET) 5-325 MG per tablet 1 tablet (1 tablet Oral Given 4/11/19 1432)   ondansetron (ZOFRAN-ODT) disintegrating tablet 4 mg (4 mg Oral Given 4/11/19 1432)   naproxen (NAPROSYN) tablet 500 mg (500 mg Oral Given 4/11/19 1512)   predniSONE (DELTASONE) tablet 50 mg (50 mg Oral Given 4/11/19 occasionally words are mis-transcribed.)    Alyssa England PA-C (electronically signed)            Alyssa England PA-C  04/11/19 1130

## 2019-04-11 NOTE — ED PROVIDER NOTES
I independently performed a history and physical on Manpreet Puckett. All diagnostic, treatment, and disposition decisions were made by myself in conjunction with the advanced practice provider. Briefly, this is a 80 y.o. female here for right hip pain. This is ongoing for several years. Worse in the past few days. She has an appointment tomorrow with orthopedic doctor. She is not really taking any medication at home including NSAIDs or narcotics. She has had an issue with narcotics in the past apparently causing severe depression. There's been no new injury or trauma. Family is concerned that her pain associated she is not ambulating around as much. And she lives by herself. There is no fevers or chills. Pain is sharp, severe at times, radiating throughout the right hip region. .  See FELIPE note      On exam:  Constitutional:  Well developed, well nourished, non-toxic appearance   Eyes:  PERRL, conjunctiva normal   HENT:  Atraumatic, external ears normal, nosenormal, oropharynx moist, no pharyngeal exudates. Neck- normal range of motion, supple   Respiratory:  No respiratory distress, normal breath sounds, no rales, no wheezing   Cardiovascular:  Normal rate, normal rhythm, no murmurs,   GI:  Soft, nondistended, nontender, no obvious organomegaly, no mass, no rebound, no guarding   Musculoskeletal:  No tenderness, no deformities. Hip is only mildly tender on passive range of motion. He did have the patient stand and attempt ambulation she did have pain to the right hip on weightbearing. There is no tender to other parts of the pelvis  Integument:  no rashes on exposed surfaces  Neurologic:  Alert & oriented x 3,  no focal deficits noted   Psychiatric:  Speech and behavior appropriate. No agitation. MDM    I did discuss the case with Blake Ahuja, who is the assistant to Dr. Leo Montiel of orthopedics. She has an established relationship with him as an appointment tomorrow.   Patient was very

## 2019-04-11 NOTE — ED NOTES
Bed: 12  Expected date:   Expected time:   Means of arrival: [facility]  Comments:  [place] [facility]  04/11/19 5550

## 2019-04-12 ENCOUNTER — OFFICE VISIT (OUTPATIENT)
Dept: ORTHOPEDIC SURGERY | Age: 84
End: 2019-04-12
Payer: MEDICARE

## 2019-04-12 VITALS
DIASTOLIC BLOOD PRESSURE: 60 MMHG | HEIGHT: 66 IN | BODY MASS INDEX: 20.89 KG/M2 | WEIGHT: 130 LBS | SYSTOLIC BLOOD PRESSURE: 107 MMHG

## 2019-04-12 DIAGNOSIS — M70.61 TROCHANTERIC BURSITIS OF RIGHT HIP: ICD-10-CM

## 2019-04-12 DIAGNOSIS — M16.11 ARTHRITIS OF RIGHT HIP: ICD-10-CM

## 2019-04-12 PROCEDURE — 99213 OFFICE O/P EST LOW 20 MIN: CPT | Performed by: ORTHOPAEDIC SURGERY

## 2019-04-12 NOTE — PROGRESS NOTES
current or ex partner: None        Emotionally abused: None        Physically abused: None        Forced sexual activity: None    Other Topics      Concerns:        None    Social History Narrative      None      Current Outpatient Medications:  naproxen (NAPROSYN) 500 MG tablet, Take 1 tablet by mouth 2 times daily, Disp: 20 tablet, Rfl: 0  methylPREDNISolone (MEDROL, SEDRICK,) 4 MG tablet, Take 1 tablet by mouth See Admin Instructions for 6 days Take by mouth., Disp: 1 kit, Rfl: 0  simvastatin (ZOCOR) 10 MG tablet, Take 1 tablet by mouth nightly, Disp: 30 tablet, Rfl: 5  lisinopril (PRINIVIL;ZESTRIL) 20 MG tablet, Take 1 tablet by mouth daily, Disp: 30 tablet, Rfl: 5  Cholecalciferol (VITAMIN D3) 1000 units TABS, Take by mouth, Disp: , Rfl:   Magnesium Hydroxide (MAGNESIA PO), Take by mouth, Disp: , Rfl:   aspirin 81 MG tablet, Take 81 mg by mouth daily. , Disp: , Rfl:     No current facility-administered medications for this visit. -- Oxycodone -- Other (See Comments)    --  Depression   -- Norvasc [Amlodipine Besylate] -- Anxiety    VITAL SIGNS:  /60   Ht 5' 6\" (1.676 m)   Wt 130 lb (59 kg)   BMI 20.98 kg/m²   The patient is alert and oriented. She answers questions appropriately. Her much anymore range of motion hip is causes pain like to get her flexed about 30° before she starts having pain and I can do a log roll she does not have a lot of pain. She says is mainly when she is weightbearing. She has no swelling of the right lower extremity. She appears of normal sensibility. I reviewed x-rays from her emergency department visit yesterday and agreed that it appeared to be no sudden collapse fracture or dislocation. Impression: Right hip osteoarthritis. Plan: We will try to get him scheduled for right total hip arthroplasty patient to be cleared by cardiology and and probably neurology.   We did discuss that the seizure disorder can lead to problems after hip replacement such as dislocations or fractures. complains of pain/discomfort

## 2019-04-15 ENCOUNTER — TELEPHONE (OUTPATIENT)
Dept: ORTHOPEDIC SURGERY | Age: 84
End: 2019-04-15

## 2019-04-15 NOTE — TELEPHONE ENCOUNTER
Please call patient saw pcp got steroids states her feet are swollen  Please call daughter to advise.

## 2019-04-15 NOTE — PROGRESS NOTES
[facility] / [facility] ([facility]) Mario Alvarez, [address]    Acknowledgment of Informed Consent for Surgical or Medical Procedure and Sedation  I agree to allow doctor(s) [facility] and his/her associates or assistants, including residents and/or other qualified medical practitioner to perform the following medical treatment or procedure and to administer or direct the administration of sedation as necessary:  Procedure(s): HIP TOTAL ARTHROPLASTY  My doctor has explained the following regarding the proposed procedure:   the explanation of the procedure   the benefits of the procedure   the potential problems that might occur during recuperation   the risks and side effects of the procedure which could include but are not limited to severe blood loss, infection, stroke or death   the benefits, risks and side effect of alternative procedures including the consequences of declining this procedure or any alternative procedures   the likelihood of achieving satisfactory results. I acknowledge no guarantee or assurance has been made to me regarding the results. I understand that during the course of this treatment/procedure, unforeseen conditions can occur which require an additional or different procedure. I agree to allow my physician or assistants to perform such extension of the original procedure as they may find necessary. I understand that sedation will often result in temporary impairment of memory and fine motor skills and that sedation can occasionally progress to a state of deep sedation or general anesthesia. I understand the risks of anesthesia for surgery include, but are not limited to, sore throat, hoarseness, injury to face, mouth, or teeth; nausea; headache; injury to blood vessels or nerves; death, brain damage, or paralysis.     I understand that if I have a Limitation of Treatment order in effect during my hospitalization, the order may or may not be in effect during this procedure. I give my doctor permission to give me blood or blood products. I understand that there are risks with receiving blood such as hepatitis, AIDS, fever, or allergic reaction. I acknowledge that the risks, benefits, and alternatives of this treatment have been explained to me and that no express or implied warranty has been given by the hospital, any blood bank, or any person or entity as to the blood or blood components transfused. At the discretion of my doctor, I agree to allow observers, equipment/product representatives and allow photographing, and/or televising of the procedure, provided my name or identity is maintained confidentially. I agree the hospital may dispose of or use for scientific or educational purposes any tissue, fluid, or body parts which may be removed.     ________________________________Date________Time______ am/pm  ([address])  Patient or Signature of Closest Relative or Legal Guardian    ________________________________Date________Time______am/pm      Page 1 of  1  Witness

## 2019-04-19 RX ORDER — SODIUM CHLORIDE, SODIUM LACTATE, POTASSIUM CHLORIDE, CALCIUM CHLORIDE 600; 310; 30; 20 MG/100ML; MG/100ML; MG/100ML; MG/100ML
INJECTION, SOLUTION INTRAVENOUS CONTINUOUS
Status: CANCELLED | OUTPATIENT
Start: 2019-05-01

## 2019-04-19 RX ORDER — OXYCODONE HCL 10 MG/1
10 TABLET, FILM COATED, EXTENDED RELEASE ORAL ONCE
Status: CANCELLED | OUTPATIENT
Start: 2019-05-01

## 2019-04-19 RX ORDER — DEXAMETHASONE SODIUM PHOSPHATE 4 MG/ML
10 INJECTION, SOLUTION INTRA-ARTICULAR; INTRALESIONAL; INTRAMUSCULAR; INTRAVENOUS; SOFT TISSUE ONCE
Status: CANCELLED | OUTPATIENT
Start: 2019-05-01

## 2019-04-19 NOTE — PROGRESS NOTES
[facility] / [facility] ([facility]) [address] [facility], [address]    Acknowledgment of Informed Consent for Surgical or Medical Procedure and Sedation  I agree to allow doctor(s) [facility] and his/her associates or assistants, including residents and/or other qualified medical practitioner to perform the following medical treatment or procedure and to administer or direct the administration of sedation as necessary:  Procedure(s): RIGHT HIP TOTAL ARTHROPLASTY  My doctor has explained the following regarding the proposed procedure:   the explanation of the procedure   the benefits of the procedure   the potential problems that might occur during recuperation   the risks and side effects of the procedure which could include but are not limited to severe blood loss, infection, stroke or death   the benefits, risks and side effect of alternative procedures including the consequences of declining this procedure or any alternative procedures   the likelihood of achieving satisfactory results. I acknowledge no guarantee or assurance has been made to me regarding the results. I understand that during the course of this treatment/procedure, unforeseen conditions can occur which require an additional or different procedure. I agree to allow my physician or assistants to perform such extension of the original procedure as they may find necessary. I understand that sedation will often result in temporary impairment of memory and fine motor skills and that sedation can occasionally progress to a state of deep sedation or general anesthesia. I understand the risks of anesthesia for surgery include, but are not limited to, sore throat, hoarseness, injury to face, mouth, or teeth; nausea; headache; injury to blood vessels or nerves; death, brain damage, or paralysis.     I understand that if I have a Limitation of Treatment order in effect during my hospitalization, the order may or

## 2019-04-22 ENCOUNTER — HOSPITAL ENCOUNTER (OUTPATIENT)
Dept: PREADMISSION TESTING | Age: 84
Discharge: HOME | End: 2019-04-26
Payer: MEDICARE

## 2019-04-22 ENCOUNTER — TELEPHONE (OUTPATIENT)
Dept: ORTHOPEDIC SURGERY | Age: 84
End: 2019-04-22

## 2019-04-22 VITALS
BODY MASS INDEX: 20.98 KG/M2 | RESPIRATION RATE: 16 BRPM | OXYGEN SATURATION: 100 % | HEART RATE: 74 BPM | TEMPERATURE: 97.4 F | SYSTOLIC BLOOD PRESSURE: 134 MMHG | DIASTOLIC BLOOD PRESSURE: 63 MMHG | HEIGHT: 66 IN

## 2019-04-22 LAB
ABO/RH: NORMAL
ALBUMIN SERPL-MCNC: 4.1 G/DL (ref 3.4–5)
ALP BLD-CCNC: 43 U/L (ref 40–129)
ALT SERPL-CCNC: 10 U/L (ref 10–40)
AMORPHOUS: ABNORMAL /HPF
ANION GAP SERPL CALCULATED.3IONS-SCNC: 9 MMOL/L (ref 3–16)
ANTIBODY SCREEN: NORMAL
APTT: 34.6 SEC (ref 26–36)
AST SERPL-CCNC: 15 U/L (ref 15–37)
BACTERIA: ABNORMAL /HPF
BASOPHILS ABSOLUTE: 0 K/UL (ref 0–0.2)
BASOPHILS RELATIVE PERCENT: 0.7 %
BILIRUB SERPL-MCNC: 0.4 MG/DL (ref 0–1)
BILIRUBIN DIRECT: <0.2 MG/DL (ref 0–0.3)
BILIRUBIN URINE: NEGATIVE
BILIRUBIN, INDIRECT: NORMAL MG/DL (ref 0–1)
BLOOD, URINE: NEGATIVE
BUN BLDV-MCNC: 15 MG/DL (ref 7–20)
CALCIUM SERPL-MCNC: 9.7 MG/DL (ref 8.3–10.6)
CHLORIDE BLD-SCNC: 103 MMOL/L (ref 99–110)
CLARITY: ABNORMAL
CO2: 29 MMOL/L (ref 21–32)
COLOR: YELLOW
CREAT SERPL-MCNC: 0.7 MG/DL (ref 0.6–1.2)
EKG ATRIAL RATE: 72 BPM
EKG DIAGNOSIS: NORMAL
EKG P AXIS: 71 DEGREES
EKG P-R INTERVAL: 180 MS
EKG Q-T INTERVAL: 382 MS
EKG QRS DURATION: 106 MS
EKG QTC CALCULATION (BAZETT): 418 MS
EKG R AXIS: 99 DEGREES
EKG T AXIS: 47 DEGREES
EKG VENTRICULAR RATE: 72 BPM
EOSINOPHILS ABSOLUTE: 0.2 K/UL (ref 0–0.6)
EOSINOPHILS RELATIVE PERCENT: 2.4 %
EPITHELIAL CELLS, UA: ABNORMAL /HPF
GFR AFRICAN AMERICAN: >60
GFR NON-AFRICAN AMERICAN: >60
GLUCOSE BLD-MCNC: 93 MG/DL (ref 70–99)
GLUCOSE URINE: NEGATIVE MG/DL
HCT VFR BLD CALC: 39.1 % (ref 36–48)
HEMOGLOBIN: 12.7 G/DL (ref 12–16)
INR BLD: 0.96 (ref 0.86–1.14)
KETONES, URINE: ABNORMAL MG/DL
LEUKOCYTE ESTERASE, URINE: NEGATIVE
LYMPHOCYTES ABSOLUTE: 0.7 K/UL (ref 1–5.1)
LYMPHOCYTES RELATIVE PERCENT: 9.9 %
MCH RBC QN AUTO: 29.6 PG (ref 26–34)
MCHC RBC AUTO-ENTMCNC: 32.5 G/DL (ref 31–36)
MCV RBC AUTO: 91.2 FL (ref 80–100)
MICROSCOPIC EXAMINATION: YES
MONOCYTES ABSOLUTE: 0.6 K/UL (ref 0–1.3)
MONOCYTES RELATIVE PERCENT: 8.6 %
NEUTROPHILS ABSOLUTE: 5.4 K/UL (ref 1.7–7.7)
NEUTROPHILS RELATIVE PERCENT: 78.4 %
NITRITE, URINE: NEGATIVE
PDW BLD-RTO: 15.3 % (ref 12.4–15.4)
PH UA: 7.5 (ref 5–8)
PLATELET # BLD: 207 K/UL (ref 135–450)
PMV BLD AUTO: 8.3 FL (ref 5–10.5)
POTASSIUM SERPL-SCNC: 5.2 MMOL/L (ref 3.5–5.1)
PROTEIN UA: ABNORMAL MG/DL
PROTHROMBIN TIME: 10.9 SEC (ref 9.8–13)
RBC # BLD: 4.29 M/UL (ref 4–5.2)
RBC UA: ABNORMAL /HPF (ref 0–2)
SODIUM BLD-SCNC: 141 MMOL/L (ref 136–145)
SPECIFIC GRAVITY UA: 1.01 (ref 1–1.03)
TOTAL PROTEIN: 6.8 G/DL (ref 6.4–8.2)
URINE TYPE: ABNORMAL
UROBILINOGEN, URINE: 0.2 E.U./DL
WBC # BLD: 6.9 K/UL (ref 4–11)
WBC UA: ABNORMAL /HPF (ref 0–5)

## 2019-04-22 PROCEDURE — 93005 ELECTROCARDIOGRAM TRACING: CPT | Performed by: ORTHOPAEDIC SURGERY

## 2019-04-22 PROCEDURE — 86850 RBC ANTIBODY SCREEN: CPT

## 2019-04-22 PROCEDURE — 86901 BLOOD TYPING SEROLOGIC RH(D): CPT

## 2019-04-22 PROCEDURE — 85730 THROMBOPLASTIN TIME PARTIAL: CPT

## 2019-04-22 PROCEDURE — 81001 URINALYSIS AUTO W/SCOPE: CPT

## 2019-04-22 PROCEDURE — 83036 HEMOGLOBIN GLYCOSYLATED A1C: CPT

## 2019-04-22 PROCEDURE — 87086 URINE CULTURE/COLONY COUNT: CPT

## 2019-04-22 PROCEDURE — 85025 COMPLETE CBC W/AUTO DIFF WBC: CPT

## 2019-04-22 PROCEDURE — 80048 BASIC METABOLIC PNL TOTAL CA: CPT

## 2019-04-22 PROCEDURE — 85610 PROTHROMBIN TIME: CPT

## 2019-04-22 PROCEDURE — 86900 BLOOD TYPING SEROLOGIC ABO: CPT

## 2019-04-22 PROCEDURE — 80076 HEPATIC FUNCTION PANEL: CPT

## 2019-04-22 PROCEDURE — 87641 MR-STAPH DNA AMP PROBE: CPT

## 2019-04-22 PROCEDURE — 93010 ELECTROCARDIOGRAM REPORT: CPT | Performed by: INTERNAL MEDICINE

## 2019-04-22 RX ORDER — LEVETIRACETAM 250 MG/1
250 TABLET ORAL
COMMUNITY

## 2019-04-22 NOTE — PROGRESS NOTES
Snoring? Do you snore loudly (loud enough to be heard through closed doors, or your bed partner elbows you for snoring at night)? No    Tired? Do you often feel tired, fatigued, or sleepy during the daytime (such as falling asleep during driving)? No    Observed? Has anyone observed you stop breathing or choking/gasping during your sleep? No    Pressure? Do you have or are being treated for high blood pressure? Yes    Neck Size? (measured around Yadiels apple)  For male, is your shirt collar 17 inches or larger? For female, is your shirt collar 16 inches or larger? No    Age older than 48years old? Yes    Gender = Male  No    Body Mass Index more than 35 kg/m2? No    Risk of CAROLYN Scoring criteria:    [x] Low risk:  Yes to 0 - 2 questions    [] Intermediate risk:  Yes to 3 - 4 questions    [] High risk:  Yes to 5 - 8 questions     Results called to OR Scheduling?   No

## 2019-04-22 NOTE — PROGRESS NOTES
[address]                          Date of Procedure 5/1/19 Time of Procedure per surgeon's office. PRIOR TO PROCEDURE DATE:  1. Please follow any guidelines/instructions prior to your procedure as advised by your surgeon. 2. Arrange for someone to drive you home and be with you for the first 24 hours after discharge for your safety after your procedure for which you received sedation. Ensure it is someone we can share information with regarding your discharge. 3. You must contact your surgeon for instructions IF:   You are taking any blood thinners, aspirin, anti-inflammatory or vitamin E.   There is a change in your physical condition such as a cold, fever, rash, cuts, sores or any other infection, especially near your surgical site. 4. Do not drink alcohol the day before or day of your procedure. 5. A Pre-op History and Physical for surgery MUST be completed by your Physician or Urgent Care within 30 days of your procedure date. Please bring a copy with you on the day of your procedure and along with any other testing performed. THE DAY OF YOUR PROCEDURE:  1. Follow instructions for ARRIVAL TIME as DIRECTED BY YOUR SURGEON. If your surgeon does not give you a specific arrival time, please arrive at per surgeon's office. 2. Enter the MAIN entrance from [email or web address] and follow the signs to the free [employer] or [employer] parking (offered free of charge 6am-5pm). 3. Enter the Main Entrance of the hospital (do NOT enter from the lower level of the parking garage). Upon entrance, check in with the  at the main desk on your left. If no one is available at the desk, proceed into the [facility] Waiting Room and go through the door directly into the [facility]. There is a Check-in desk ACROSS from Room 5 (marked with a sign hanging from the ceiling).  The phone number for the surgery center is [phone].    4. DO NOT EAT room.    12. If you have a Living Will or Ilia Mendoza, please bring a copy on the day of your procedure. 15.  With your permission, one family member may accompany you while you are being prepared for surgery. Once you are ready, additional family members may join you. HOW WE KEEP YOU SAFE and WORK TO PREVENT SURGICAL SITE INFECTIONS:  1. Health care workers should always check your ID bracelet to verify your name and birth date. You will be asked many times to state your name, date of birth, and allergies. 2. Health care workers should always clean their hands with soap or alcohol gel before providing care to you. It is okay to ask anyone if they cleaned their hands before they touch you. 3. You will be actively involved in verifying the type of procedure you are having and ensuring the correct surgical site. This will be confirmed multiple times prior to your procedure. Do NOT reyes your surgery site UNLESS instructed to by your surgeon. 4. Do not shave or wax for 72 hours prior to procedure near your operative site. Shaving with a razor can irritate your skin and make it easier to develop an infection. On the day of your procedure, any hair that needs to be removed near the surgical site will be clipped by a healthcare worker using a special clippers designed to avoid skin irritation. 5. When you are in the operating room, your surgical site will be cleansed with a special soap, and in most cases, you will be given an antibiotic before the surgery begins. What to expect AFTER YOUR PROCEDURE:  1. Immediately following your procedure, your will be taken to the PACU for the first phase of your recovery. Your nurse will help you recover from any potential side effects of anesthesia, such as extreme drowsiness, changes in your vital signs or breathing patterns. Nausea, headache, muscle aches, or sore throat may also occur after anesthesia.   Your nurse will help you manage these

## 2019-04-23 ENCOUNTER — OFFICE VISIT (OUTPATIENT)
Dept: FAMILY MEDICINE CLINIC | Age: 84
End: 2019-04-23
Payer: MEDICARE

## 2019-04-23 VITALS
HEIGHT: 66 IN | DIASTOLIC BLOOD PRESSURE: 62 MMHG | SYSTOLIC BLOOD PRESSURE: 110 MMHG | BODY MASS INDEX: 20.89 KG/M2 | HEART RATE: 81 BPM | TEMPERATURE: 97.8 F | WEIGHT: 130 LBS | OXYGEN SATURATION: 98 %

## 2019-04-23 DIAGNOSIS — R56.9 NOCTURNAL SEIZURES (HCC): ICD-10-CM

## 2019-04-23 DIAGNOSIS — M16.11 ARTHRITIS OF RIGHT HIP: ICD-10-CM

## 2019-04-23 DIAGNOSIS — M79.89 SWELLING OF LOWER LEG: ICD-10-CM

## 2019-04-23 DIAGNOSIS — I10 ESSENTIAL HYPERTENSION: ICD-10-CM

## 2019-04-23 DIAGNOSIS — Z01.818 PREOP EXAMINATION: Primary | ICD-10-CM

## 2019-04-23 LAB
ESTIMATED AVERAGE GLUCOSE: 125.5 MG/DL
HBA1C MFR BLD: 6 %
MRSA SCREEN RT-PCR: NORMAL

## 2019-04-23 PROCEDURE — 99214 OFFICE O/P EST MOD 30 MIN: CPT | Performed by: PHYSICIAN ASSISTANT

## 2019-04-23 NOTE — PATIENT INSTRUCTIONS
Richardson Meyers was seen today for pre-op exam.    Diagnoses and all orders for this visit:    Preop examination    Essential hypertension    Arthritis of right hip    Nocturnal seizures ([facility] [address]       Ask if she should take Keppra the morning of surgery tomorrow at ortho appointment. STOP ASPIRIN TODAY. Cleared for surgery.

## 2019-04-23 NOTE — PROGRESS NOTES
Preoperative Consultation    Trey Obrien  YOB: 1934    This patient presents to the office today for a preoperative consultation at the request of surgeon, Dr. Jessie Wan, who plans on [facility]  She has had right hip arthritis for awhile now but drastically worsened in the last 7 weeks. She saw neurology a couple months ago. Has had a few episodes of falls, complete funk for a few weeks, does not remember incidents, tongue swelling, broken clavicle. All 3 incidents have been in the morning after sleeping. EEG and MRI normal but he thinks nocturnal seizures. She has been taking Keppra for about a month. She has controlled, stable and medicated HTN. No SE's to medicine. Planned anesthesia: General   Known anesthesia problems: None   Bleeding risk: No recent or remote history of abnormal bleeding  Personal or FH of DVT/PE: No      Patient Active Problem List   Diagnosis   (none) - all problems resolved or deleted     Past Surgical History:   Procedure Laterality Date    APPENDECTOMY      COLONOSCOPY      JOINT REPLACEMENT      left    SALPINGO-OOPHORECTOMY  12/11/2017    robotic, and hysteroscopy d&c, polypectomy        Allergies   Allergen Reactions    Oxycodone Other (See Comments)     Depression    Norvasc [Amlodipine Besylate] Anxiety     Outpatient Medications Marked as Taking for the 4/23/19 encounter (Office Visit) with MOLLY Lawton   Medication Sig Dispense Refill    levETIRAcetam (KEPPRA) 250 MG tablet Take 250 mg by mouth One tab in a.m.  And 2 tabs at bedtime      simvastatin (ZOCOR) 10 MG tablet Take 1 tablet by mouth nightly 30 tablet 5    lisinopril (PRINIVIL;ZESTRIL) 20 MG tablet Take 1 tablet by mouth daily (Patient taking differently: Take 20 mg by mouth nightly ) 30 tablet 5    Cholecalciferol (VITAMIN D3) 1000 units TABS Take by mouth nightly       Magnesium Hydroxide (MAGNESIA PO) Take 500 mg by mouth nightly       aspirin 81 MG tablet Take 81 mg by mouth nightly          Social History     Tobacco Use    Smoking status: Never Smoker    Smokeless tobacco: Never Used   Substance Use Topics    Alcohol use: No     No family history on file. Review of Systems  A comprehensive review of systems was negative except for what was noted in the HPI. Physical Exam   /62 (Site: Left Upper Arm, Position: Sitting, Cuff Size: Medium Adult)   Pulse 81   Temp 97.8 °F (36.6 °C) (Tympanic)   Ht 5' 6\" (1.676 m)   Wt 130 lb (59 kg)   SpO2 98%   BMI 20.98 kg/m²   Weight: 130 lb (59 kg)   Constitutional: She is oriented to person, place, and time. She appears well-developed and well-nourished. No distress. HENT:   Head: Normocephalic and atraumatic. Mouth/Throat: Uvula is midline, oropharynx is clear and moist and mucous membranes are normal.   Eyes: Conjunctivae and EOM are normal. Pupils are equal, round, and reactive to light. Neck: Trachea normal and normal range of motion. Neck supple. No JVD present. Carotid bruit is not present. No mass and no thyromegaly present. Cardiovascular: Normal rate, regular rhythm, normal heart sounds and intact distal pulses. Exam reveals no gallop and no friction rub. No murmur heard. Normal pedal pulses but bilateral ankles/feet with 1+ nonpitting edema  Pulmonary/Chest: Effort normal and breath sounds normal. No respiratory distress. She has no wheezes. She has no rales. Abdominal: Soft. Normal aorta and bowel sounds are normal. She exhibits no distension and no mass. There is no hepatosplenomegaly. No tenderness. Musculoskeletal: She exhibits no edema and no tenderness. Neurological: She is alert and oriented to person, place, and time. She has normal strength. No cranial nerve deficit or sensory deficit. Coordination and gait normal.   Skin: Skin is warm and dry. No rash noted. No erythema.      EKG Interpretation:  NSR, incomplete RBBB, unchanged from

## 2019-04-24 ENCOUNTER — OFFICE VISIT (OUTPATIENT)
Dept: ORTHOPEDIC SURGERY | Age: 84
End: 2019-04-24

## 2019-04-24 ENCOUNTER — TELEPHONE (OUTPATIENT)
Dept: ORTHOPEDIC SURGERY | Age: 84
End: 2019-04-24

## 2019-04-24 VITALS
SYSTOLIC BLOOD PRESSURE: 114 MMHG | BODY MASS INDEX: 20.89 KG/M2 | HEIGHT: 66 IN | WEIGHT: 130 LBS | HEART RATE: 94 BPM | DIASTOLIC BLOOD PRESSURE: 60 MMHG

## 2019-04-24 DIAGNOSIS — M16.11 ARTHRITIS OF RIGHT HIP: Primary | ICD-10-CM

## 2019-04-24 LAB — URINE CULTURE, ROUTINE: NORMAL

## 2019-04-24 PROCEDURE — 99024 POSTOP FOLLOW-UP VISIT: CPT | Performed by: ORTHOPAEDIC SURGERY

## 2019-04-24 NOTE — TELEPHONE ENCOUNTER
[facility] [id number]    Date: 5/1/19  Type of SX:  Inpatient  Location: [facility]  CPT: 13566   SX area: RT hip

## 2019-04-25 ENCOUNTER — HOSPITAL ENCOUNTER (OUTPATIENT)
Dept: PHYSICAL THERAPY | Age: 84
Setting detail: THERAPIES SERIES
Discharge: HOME | End: 2019-04-25
Payer: MEDICARE

## 2019-04-25 PROCEDURE — 97162 PT EVAL MOD COMPLEX 30 MIN: CPT | Performed by: PHYSICAL THERAPIST

## 2019-04-25 PROCEDURE — 97530 THERAPEUTIC ACTIVITIES: CPT | Performed by: PHYSICAL THERAPIST

## 2019-04-25 NOTE — FLOWSHEET NOTE
[address] [facility]    Physical Therapy Daily Treatment Note  Date:  2019    Patient Name:  Jazmin Page    :  1934  MRN: [id number]  Medical/Treatment Diagnosis Information:  · Diagnosis: M16.11 (ICD-10-CM) - Arthritis of right hip  · Treatment Diagnosis: R26.2 difficulty walking  Insurance/Certification information:  PT Insurance Information: [address]  Physician Information:  Referring Practitioner: Dr. Roman Slight of care signed (Y/N):     Date of Patient follow up with Physician:     Functional scale:      Date Functional Scale Completed:    PT G-Codes  Functional Assessment Tool Used: LEFS  Score: 96% limitation    Progress Note: ?  Yes  ? No  Next due by: Visit #10       Latex Allergy:  ?NO      ? YES  Preferred Language for Healthcare:   ?English       ? other:    Visit # Insurance Allowable Date Range   1 orthonet auth ? ??     Pain level:  10/10     SUBJECTIVE:  See eval    OBJECTIVE: See eval       RESTRICTIONS/PRECAUTIONS: fall risk    Exercises/Interventions:     Therapeutic Exercises  Resistance / level Sets/sec Reps Notes   glute sets  5\" 2    Quad sets  5\" 3    LAQ  1 5    Supine knee flexion AROM    Educated for home   Ankle pumps  1 10                                              Neuromuscular Re-ed / Therapeutic Activities       Gait training  5'  Correct walker height                 Manual Intervention       Knee mobs/PROM       Tib/Fem Mobs       Patella Mobs       Ankle mobs                       Pt. Education:  -pt. Educated on diagnosis, prognosis and expectations for rehab  -pt. Provided with written and illustrated instructions for HEP  -all pt.  Questions were answered  -reviewed expectations for upcoming surgery    Therapeutic Exercise and NMR EXR  X ? (54351) Provided verbal/tactile cueing for activities related to strengthening, flexibility, endurance, ROM for improvements in LE, proximal hip, and core control with self care, mobility, lifting, ambulation. ? (20087) Provided verbal/tactile cueing for activities related to improving balance, coordination, kinesthetic sense, posture, motor skill, proprioception  to assist with LE, proximal hip, and core control in self care, mobility, lifting, ambulation and eccentric single leg control. NMR and Therapeutic Activities:    X ? (96042 or 47719) Provided verbal/tactile cueing for activities related to improving balance, coordination, kinesthetic sense, posture, motor skill, proprioception and motor activation to allow for proper function of core, proximal hip and LE with self care and ADLs  X ? (30784) Gait Re-education- Provided training and instruction to the patient for proper LE, core and proximal hip recruitment and positioning and eccentric body weight control with ambulation re-education including up and down stairs     Home Exercise Program:    ? (68406) Reviewed/Progressed HEP activities related to strengthening, flexibility, endurance, ROM of core, proximal hip and LE for functional self-care, mobility, lifting and ambulation/stair navigation   ? (58925)Reviewed/Progressed HEP activities related to improving balance, coordination, kinesthetic sense, posture, motor skill, proprioception of core, proximal hip and LE for self care, mobility, lifting, and ambulation/stair navigation      Manual Treatments:  PROM / STM / Oscillations-Mobs:  G-I, II, III, IV (PA's, Inf., Post.)  ? (26264) Provided manual therapy to mobilize LE, proximal hip and/or LS spine soft tissue/joints for the purpose of modulating pain, promoting relaxation,  increasing ROM, reducing/eliminating soft tissue swelling/inflammation/restriction, improving soft tissue extensibility and allowing for proper ROM for normal function with self care, mobility, lifting and ambulation.      Modalities:  ? (88179) Vasopneumatic compression: Utilized vasopneumatic compression to decrease Poor    Patient Requires Follow-up: ? Yes  ? No    Return to Play:    ?  N/A   ? Stage 1: Intro to Strength   ? Stage 2: Return to Run and Strength   ? Stage 3: Return to Jump and Strength   ? Stage 4: Dynamic Strength and Agility   ? Stage 5: Sport Specific Training     ? Ready to Return to Play, Meets All Above Stages   ? Not Ready for Return to Sports   Comments:            PLAN: See eval  ? Continue per plan of care ? Alter current plan (see comments)  ? Plan of care initiated ? Hold pending MD visit ?  Discharge    Electronically signed by: Padmini Jenkins PT, DPT

## 2019-04-25 NOTE — PLAN OF CARE
Sukh, [address] [facility] [place], [address] [place] [address]  Phone: [phone]   Fax: [phone]                                                       Physical Therapy Certification    Dear Referring Practitioner: Dr. Sandee Arvizu,    We had the pleasure of evaluating the following patient for physical therapy services at [address] [place] [address]. A summary of our findings can be found in the initial assessment below. This includes our plan of care. If you have any questions or concerns regarding these findings, please do not hesitate to contact me at the office phone number checked above. Thank you for the referral.       Physician Signature:_______________________________Date:__________________  By signing above (or electronic signature), therapists plan is approved by physician      Patient: Viri Yadav   : 1934   MRN: [id number]  Referring Physician: Referring Practitioner: Dr. Sandee Arvizu      Evaluation Date: 2019      Medical Diagnosis Information:  Diagnosis: M16.11 (ICD-10-CM) - Arthritis of right hip   Treatment Diagnosis: R26.2 difficulty walking                                         Insurance information: PT Insurance Information: An[employer] Medicare - orthonet auth     Precautions/ Contra-indications: fall risk  Latex Allergy:  ?NO      ? YES  Preferred Language for Healthcare:   ?English       ? other:    SUBJECTIVE: Patient stated complaint: L hip has been hurting since  but pt. Was able to do exercises to help with bursitis; pt. States that last year she had an injection with relief, she had a second one without relief; pt. Notes that over the past 6-8wks pt. Has been in excruciating pain; 2 wks previous she had to call 911 to go to the ER because of the pain; pt. Notes that she feels best when laying; pain medication does not help the pain; pt.  States 15.8# 16.1#   Hamstrings NT NT                Functional testing Pre hab        Date 4/25/19 Re eval post op   Date  4 week f/u   Date  8 week f/u  Date  D/c    TUG 2:36       30 second sit to stand 0       6 minute walk 31m               Balance (s)        Narrow RODNEY 10       Semi tandem 10       Tandem  0       SLS 0               Knee AROM        Knee flexion 110       Knee extension -2               Strength (0-5)        Knee Extension 15.8       Hip aBduction  NT               LEFS 3/80                              ? Patient history, allergies, meds reviewed. Medical chart reviewed. See intake form. Review Of Systems (ROS):  ?Performed Review of systems (Integumentary, CardioPulmonary, Neurological) by intake and observation. Intake form has been scanned into medical record. Patient has been instructed to contact their primary care physician regarding ROS issues if not already being addressed at this time. Co-morbidities/Complexities (which will affect course of rehabilitation):   ? None           Arthritic conditions   ? Rheumatoid arthritis (M05.9)  X ? Osteoarthritis (M19.91)   Cardiovascular conditions   ? Hypertension (I10)  ? Hyperlipidemia (E78.5)  ? Angina pectoris (I20)  ? Atherosclerosis (I70)   Musculoskeletal conditions   ? Disc pathology   ? Congenital spine pathologies   X ? Prior surgical intervention  ? Osteoporosis (M81.8)  ? Osteopenia (M85.8)   Endocrine conditions   ? Hypothyroid (E03.9)  ? Hyperthyroid Gastrointestinal conditions   ? Constipation (N00.46)   Metabolic conditions   ? Morbid obesity (E66.01)  ? Diabetes type 1(E10.65) or 2 (E11.65)   ? Neuropathy (G60.9)     Pulmonary conditions   ? Asthma (J45)  ? Coughing   ? COPD (J44.9)   Psychological Disorders  ? Anxiety (F41.9)  ? Depression (F32.9)   ? Other:   ?Other:          Barriers to/and or personal factors that will affect rehab potential:              ?Age  ? Sex    ? Smoker              ? Motivation/Lack of Motivation                        X ?Co-Morbidities              ? Cognitive Function, education/learning barriers              X ?Environmental, home barriers              ? profession/work barriers  ? past PT/medical experience  ? other:  Justification:     Falls Risk Assessment (30 days):   ? Falls Risk assessed and no intervention required. X ? Falls Risk assessed and Patient requires intervention due to being higher risk   TUG score (>12s at risk):  156s   X ? Falls education provided, including correct use of walker, slow movements, \"finding balance\" after transitional movement prior to walking       G-Codes:  PT G-Codes  Functional Assessment Tool Used: LEFS  Score: 96% limitation    ASSESSMENT: objective measures limited due to pt. Severe pain  Functional Impairments:     ? Noted lumbar/proximal hip/LE joint hypomobility   X ? Decreased LE functional ROM   X ? Decreased core/proximal hip strength and neuromuscular control   X ? Decreased LE functional strength   X ? Reduced balance/proprioceptive control   ?other:      Functional Activity Limitations (from functional questionnaire and intake)   X ? Reduced ability to tolerate prolonged functional positions   X ? Reduced ability or difficulty with changes of positions or transfers between positions   X ? Reduced ability to maintain good posture and demonstrate good body mechanics with sitting, bending, and lifting   ? Reduced ability to sleep   ? Reduced ability or tolerance with driving and/or computer work   X ? Reduced ability to perform lifting, carrying tasks   X ? Reduced ability to squat   X ? Reduced ability to forward bend   X ? Reduced ability to ambulate prolonged functional periods/distances/surfaces   X ? Reduced ability to ascend/descend stairs   ? Reduced ability to run, hop, cut or jump   ?other:    Participation Restrictions   X ? Reduced participation in self care activities   X ? Reduced participation in home management activities   ? Reduced participation in work activities   X ? Reduced participation in social activities. ?Reduced participation in sport/recreation activities. Classification :    ?Signs/symptoms consistent with post-surgical status including decreased ROM, strength and function. ? Signs/symptoms consistent with joint sprain/strain   ? Signs/symptoms consistent with patella-femoral syndrome   X ? Signs/symptoms consistent with knee OA/hip OA   ? Signs/symptoms consistent with internal derangement of knee/Hip   ? Signs/symptoms consistent with functional hip weakness/NMR control      ? Signs/symptoms consistent with tendinitis/tendinosis    ?signs/symptoms consistent with pathology which may benefit from Dry needling      ?other:      Prognosis/Rehab Potential:      ?Excellent   ? Good    X ? Fair   ? Poor    Tolerance of evaluation/treatment:    ?Excellent   ? Good    X ? Fair   X ? Poor    Physical Therapy Evaluation Complexity Justification  ? A history of present problem with:  ? no personal factors and/or comorbidities that impact the plan of care;  ?1-2 personal factors and/or comorbidities that impact the plan of care  X ?3 personal factors and/or comorbidities that impact the plan of care  ? An examination of body systems using standardized tests and measures addressing any of the following: body structures and functions (impairments), activity limitations, and/or participation restrictions;:  ? a total of 1-2 or more elements   ? a total of 3 or more elements   X ? a total of 4 or more elements   ? A clinical presentation with:  ? stable and/or uncomplicated characteristics   X ? evolving clinical presentation with changing characteristics  ? unstable and unpredictable characteristics;   ? Clinical decision making of ? low, X ? moderate, ? high complexity using standardized patient assessment instrument and/or measurable assessment of functional outcome. ? EVAL (LOW) 76877 (typically 30 minutes face-to-face)  X ? EVAL (MOD) 54355 (typically 30 minutes face-to-face)  ?  EVAL (HIGH) 39219 (typically 45 minutes face-to-face)  ? RE-EVAL     PLAN:   Frequency/Duration:  1 days per week for 1 Week: additional visit frequency to be determined at post-operative re-evaluation  Interventions:  ?  Therapeutic exercise including: strength training, ROM, for Lower extremity and core   ? NMR activation and proprioception for LE, Glutes and Core   ? Manual therapy as indicated for LE, Hip and spine to include: Dry Needling/IASTM, STM, PROM, Gr I-IV mobilizations, manipulation. ? Modalities as needed that may include: thermal agents, E-stim, Biofeedback, US, iontophoresis as indicated  ? Patient education on joint protection, postural re-education, activity modification, progression of HEP. HEP instruction: Pt. Provided with written and illustrated instructions for home program. Pt to perform exercises 1-2x day f/b ice 15 min.   (general NE program without hip abduction)    Patient education:  Patient was thoroughly educated on this date regarding prehabilitation goals, importance of PT sessions in improving overall ROM, strength and stability prior to surgery, and how prehabilitation will facilitate improved post-operative outcomes. The patient was educated on and instructed in HEP as listed. The patient was given a detailed handout for exercises to initiate in the hospital post-operatively as well as at home. The discharge plan from the hospital was reviewed with the patient; specifically, to reduce length of hospital stay and to minimize time before reinitiating outpatient physical therapy after surgery. Education regarding early mobility post-operatively in the hospital and emphasis on working with both physical therapy and nursing staff to achieve ambulation goal of 150 feet was provided. Also, education regarding goals and expectations was provided. The patient was educated about all applicable post-op restrictions and precautions.  The patient was encouraged to utilize ice/cold pack after surgery to address pain, minimize swelling as often as possible. It is in my medical opinion that this patient is clear from all physical barriers prior to consideration for surgery, activity modifications prior to and post operatively have been discussed with this patient as well as discharge planning and is cleared for surgery from physical therapy perspective. GOALS:  Patient stated goal: decrease pain, prepare for upcoming surgery    Therapist goals for Patient:   Short Term Goals: To be achieved in: 1 visit  1. Independent in HEP and progression per patient tolerance, in order to prevent re-injury. 2. Patient will have a decrease in pain to facilitate improvement in movement, function, and ADLs as indicated by Functional Deficits. 3. All patient questions regarding expectations for rehab following upcoming surgery are answered.      Long Term Goals: long term goals to be determined at re-eval following upcoming surgery    Electronically signed by:  Marguerite Gallegos PT

## 2019-04-30 ENCOUNTER — ANESTHESIA EVENT (OUTPATIENT)
Dept: OPERATING ROOM | Age: 84
DRG: 470 | End: 2019-04-30
Payer: MEDICARE

## 2019-05-01 ENCOUNTER — HOSPITAL ENCOUNTER (INPATIENT)
Age: 84
LOS: 5 days | Discharge: NURSING, REHAB OR LONG-TERM CARE | DRG: 470 | End: 2019-05-06
Attending: ORTHOPAEDIC SURGERY | Admitting: ORTHOPAEDIC SURGERY
Payer: MEDICARE

## 2019-05-01 ENCOUNTER — APPOINTMENT (OUTPATIENT)
Dept: GENERAL RADIOLOGY | Age: 84
DRG: 470 | End: 2019-05-01
Attending: ORTHOPAEDIC SURGERY
Payer: MEDICARE

## 2019-05-01 ENCOUNTER — ANESTHESIA (OUTPATIENT)
Dept: OPERATING ROOM | Age: 84
DRG: 470 | End: 2019-05-01
Payer: MEDICARE

## 2019-05-01 VITALS
TEMPERATURE: 96.3 F | OXYGEN SATURATION: 99 % | SYSTOLIC BLOOD PRESSURE: 107 MMHG | RESPIRATION RATE: 13 BRPM | DIASTOLIC BLOOD PRESSURE: 53 MMHG

## 2019-05-01 DIAGNOSIS — Z96.641 STATUS POST TOTAL REPLACEMENT OF RIGHT HIP: Primary | ICD-10-CM

## 2019-05-01 LAB
ABO/RH: NORMAL
ANTIBODY SCREEN: NORMAL

## 2019-05-01 PROCEDURE — 6360000002 HC RX W HCPCS: Performed by: ORTHOPAEDIC SURGERY

## 2019-05-01 PROCEDURE — 3700000000 HC ANESTHESIA ATTENDED CARE: Performed by: ORTHOPAEDIC SURGERY

## 2019-05-01 PROCEDURE — 2500000003 HC RX 250 WO HCPCS: Performed by: ORTHOPAEDIC SURGERY

## 2019-05-01 PROCEDURE — 6370000000 HC RX 637 (ALT 250 FOR IP): Performed by: ORTHOPAEDIC SURGERY

## 2019-05-01 PROCEDURE — 7100000001 HC PACU RECOVERY - ADDTL 15 MIN: Performed by: ORTHOPAEDIC SURGERY

## 2019-05-01 PROCEDURE — 0SR90JA REPLACEMENT OF RIGHT HIP JOINT WITH SYNTHETIC SUBSTITUTE, UNCEMENTED, OPEN APPROACH: ICD-10-PCS | Performed by: ORTHOPAEDIC SURGERY

## 2019-05-01 PROCEDURE — 64447 NJX AA&/STRD FEMORAL NRV IMG: CPT | Performed by: ANESTHESIOLOGY

## 2019-05-01 PROCEDURE — 3E0T3BZ INTRODUCTION OF ANESTHETIC AGENT INTO PERIPHERAL NERVES AND PLEXI, PERCUTANEOUS APPROACH: ICD-10-PCS | Performed by: ORTHOPAEDIC SURGERY

## 2019-05-01 PROCEDURE — 2709999900 HC NON-CHARGEABLE SUPPLY: Performed by: ORTHOPAEDIC SURGERY

## 2019-05-01 PROCEDURE — 2580000003 HC RX 258: Performed by: ORTHOPAEDIC SURGERY

## 2019-05-01 PROCEDURE — 86901 BLOOD TYPING SEROLOGIC RH(D): CPT

## 2019-05-01 PROCEDURE — 86850 RBC ANTIBODY SCREEN: CPT

## 2019-05-01 PROCEDURE — 3600000014 HC SURGERY LEVEL 4 ADDTL 15MIN: Performed by: ORTHOPAEDIC SURGERY

## 2019-05-01 PROCEDURE — 6360000002 HC RX W HCPCS: Performed by: ANESTHESIOLOGY

## 2019-05-01 PROCEDURE — 2720000010 HC SURG SUPPLY STERILE: Performed by: ORTHOPAEDIC SURGERY

## 2019-05-01 PROCEDURE — 6360000002 HC RX W HCPCS: Performed by: NURSE ANESTHETIST, CERTIFIED REGISTERED

## 2019-05-01 PROCEDURE — 86900 BLOOD TYPING SEROLOGIC ABO: CPT

## 2019-05-01 PROCEDURE — 94664 DEMO&/EVAL PT USE INHALER: CPT

## 2019-05-01 PROCEDURE — 7100000000 HC PACU RECOVERY - FIRST 15 MIN: Performed by: ORTHOPAEDIC SURGERY

## 2019-05-01 PROCEDURE — 3700000001 HC ADD 15 MINUTES (ANESTHESIA): Performed by: ORTHOPAEDIC SURGERY

## 2019-05-01 PROCEDURE — 73501 X-RAY EXAM HIP UNI 1 VIEW: CPT

## 2019-05-01 PROCEDURE — C1776 JOINT DEVICE (IMPLANTABLE): HCPCS | Performed by: ORTHOPAEDIC SURGERY

## 2019-05-01 PROCEDURE — C1713 ANCHOR/SCREW BN/BN,TIS/BN: HCPCS | Performed by: ORTHOPAEDIC SURGERY

## 2019-05-01 PROCEDURE — 6360000002 HC RX W HCPCS

## 2019-05-01 PROCEDURE — 2580000003 HC RX 258: Performed by: ANESTHESIOLOGY

## 2019-05-01 PROCEDURE — 3600000004 HC SURGERY LEVEL 4 BASE: Performed by: ORTHOPAEDIC SURGERY

## 2019-05-01 PROCEDURE — 1200000000 HC SEMI PRIVATE

## 2019-05-01 PROCEDURE — 2500000003 HC RX 250 WO HCPCS: Performed by: NURSE ANESTHETIST, CERTIFIED REGISTERED

## 2019-05-01 DEVICE — OXINIUM FEMORAL HEAD 12/14 TAPER                                    36 MM -3
Type: IMPLANTABLE DEVICE | Site: HIP | Status: FUNCTIONAL
Brand: OXINIUM

## 2019-05-01 DEVICE — REFLECTION SPHERICAL HEAD SCREW 20MM
Type: IMPLANTABLE DEVICE | Site: HIP | Status: FUNCTIONAL
Brand: REFLECTION

## 2019-05-01 DEVICE — R3 SCREW HOLE COVER
Type: IMPLANTABLE DEVICE | Site: HIP | Status: FUNCTIONAL
Brand: R3

## 2019-05-01 DEVICE — COMPONENT TOT HIP CAPPED FEM ADV HD POROUS OXIN XLPE R3: Type: IMPLANTABLE DEVICE | Site: HIP | Status: FUNCTIONAL

## 2019-05-01 DEVICE — REFLECTION THREADED HOLE COVER
Type: IMPLANTABLE DEVICE | Site: HIP | Status: FUNCTIONAL
Brand: REFLECTION

## 2019-05-01 DEVICE — SYNERGY POROUS COATED FEMORAL SIZE 10
Type: IMPLANTABLE DEVICE | Site: HIP | Status: FUNCTIONAL
Brand: SYNERGY

## 2019-05-01 DEVICE — R3 0 DEGREE XLPE ACETABULAR LINER                                    36MM INNER DIAMETER X OUTER DIAMETER 52MM
Type: IMPLANTABLE DEVICE | Site: HIP | Status: FUNCTIONAL
Brand: R3

## 2019-05-01 DEVICE — ANCHOR SUT L16.3MM DIA5.5MM TI W/ TWO SZ 2 FIBERWIRE CRKSCR: Type: IMPLANTABLE DEVICE | Site: HIP | Status: FUNCTIONAL

## 2019-05-01 DEVICE — R3 3 HOLE ACETABULAR SHELL 52MM
Type: IMPLANTABLE DEVICE | Site: HIP | Status: FUNCTIONAL
Brand: R3 ACETABULAR

## 2019-05-01 RX ORDER — GLYCOPYRROLATE 1 MG/5 ML
SYRINGE (ML) INTRAVENOUS PRN
Status: DISCONTINUED | OUTPATIENT
Start: 2019-05-01 | End: 2019-05-01 | Stop reason: SDUPTHER

## 2019-05-01 RX ORDER — ROPIVACAINE HYDROCHLORIDE 5 MG/ML
INJECTION, SOLUTION EPIDURAL; INFILTRATION; PERINEURAL
Status: COMPLETED | OUTPATIENT
Start: 2019-05-01 | End: 2019-05-01

## 2019-05-01 RX ORDER — SODIUM CHLORIDE, SODIUM LACTATE, POTASSIUM CHLORIDE, CALCIUM CHLORIDE 600; 310; 30; 20 MG/100ML; MG/100ML; MG/100ML; MG/100ML
INJECTION, SOLUTION INTRAVENOUS CONTINUOUS
Status: DISCONTINUED | OUTPATIENT
Start: 2019-05-01 | End: 2019-05-01

## 2019-05-01 RX ORDER — DEXAMETHASONE SODIUM PHOSPHATE 4 MG/ML
3 INJECTION, SOLUTION INTRA-ARTICULAR; INTRALESIONAL; INTRAMUSCULAR; INTRAVENOUS; SOFT TISSUE EVERY 12 HOURS
Status: DISCONTINUED | OUTPATIENT
Start: 2019-05-02 | End: 2019-05-03

## 2019-05-01 RX ORDER — OXYCODONE HCL 10 MG/1
10 TABLET, FILM COATED, EXTENDED RELEASE ORAL ONCE
Status: COMPLETED | OUTPATIENT
Start: 2019-05-01 | End: 2019-05-01

## 2019-05-01 RX ORDER — SODIUM CHLORIDE 0.9 % (FLUSH) 0.9 %
10 SYRINGE (ML) INJECTION EVERY 12 HOURS SCHEDULED
Status: DISCONTINUED | OUTPATIENT
Start: 2019-05-01 | End: 2019-05-06 | Stop reason: HOSPADM

## 2019-05-01 RX ORDER — DEXAMETHASONE SODIUM PHOSPHATE 4 MG/ML
10 INJECTION, SOLUTION INTRA-ARTICULAR; INTRALESIONAL; INTRAMUSCULAR; INTRAVENOUS; SOFT TISSUE ONCE
Status: COMPLETED | OUTPATIENT
Start: 2019-05-01 | End: 2019-05-01

## 2019-05-01 RX ORDER — ASPIRIN 81 MG/1
81 TABLET, CHEWABLE ORAL NIGHTLY
Status: DISCONTINUED | OUTPATIENT
Start: 2019-05-01 | End: 2019-05-06 | Stop reason: HOSPADM

## 2019-05-01 RX ORDER — LEVETIRACETAM 250 MG/1
250 TABLET ORAL 2 TIMES DAILY
Status: DISCONTINUED | OUTPATIENT
Start: 2019-05-01 | End: 2019-05-01 | Stop reason: CLARIF

## 2019-05-01 RX ORDER — LISINOPRIL 20 MG/1
20 TABLET ORAL DAILY
Status: DISCONTINUED | OUTPATIENT
Start: 2019-05-01 | End: 2019-05-02

## 2019-05-01 RX ORDER — LISINOPRIL 20 MG/1
20 TABLET ORAL NIGHTLY
COMMUNITY
End: 2019-10-30 | Stop reason: SDUPTHER

## 2019-05-01 RX ORDER — FENTANYL CITRATE 50 UG/ML
25 INJECTION, SOLUTION INTRAMUSCULAR; INTRAVENOUS EVERY 5 MIN PRN
Status: DISCONTINUED | OUTPATIENT
Start: 2019-05-01 | End: 2019-05-01 | Stop reason: HOSPADM

## 2019-05-01 RX ORDER — ONDANSETRON 2 MG/ML
4 INJECTION INTRAMUSCULAR; INTRAVENOUS EVERY 6 HOURS PRN
Status: DISCONTINUED | OUTPATIENT
Start: 2019-05-01 | End: 2019-05-06 | Stop reason: HOSPADM

## 2019-05-01 RX ORDER — LEVETIRACETAM 250 MG/1
250 TABLET ORAL DAILY
Status: DISCONTINUED | OUTPATIENT
Start: 2019-05-02 | End: 2019-05-06 | Stop reason: HOSPADM

## 2019-05-01 RX ORDER — HYDRALAZINE HYDROCHLORIDE 20 MG/ML
5 INJECTION INTRAMUSCULAR; INTRAVENOUS EVERY 10 MIN PRN
Status: DISCONTINUED | OUTPATIENT
Start: 2019-05-01 | End: 2019-05-01 | Stop reason: HOSPADM

## 2019-05-01 RX ORDER — NEOSTIGMINE METHYLSULFATE 5 MG/5 ML
SYRINGE (ML) INTRAVENOUS PRN
Status: DISCONTINUED | OUTPATIENT
Start: 2019-05-01 | End: 2019-05-01 | Stop reason: SDUPTHER

## 2019-05-01 RX ORDER — SODIUM CHLORIDE 9 MG/ML
INJECTION, SOLUTION INTRAVENOUS CONTINUOUS
Status: ACTIVE | OUTPATIENT
Start: 2019-05-01 | End: 2019-05-01

## 2019-05-01 RX ORDER — PROPOFOL 10 MG/ML
INJECTION, EMULSION INTRAVENOUS PRN
Status: DISCONTINUED | OUTPATIENT
Start: 2019-05-01 | End: 2019-05-01 | Stop reason: SDUPTHER

## 2019-05-01 RX ORDER — ONDANSETRON 2 MG/ML
INJECTION INTRAMUSCULAR; INTRAVENOUS PRN
Status: DISCONTINUED | OUTPATIENT
Start: 2019-05-01 | End: 2019-05-01 | Stop reason: SDUPTHER

## 2019-05-01 RX ORDER — ROCURONIUM BROMIDE 10 MG/ML
INJECTION, SOLUTION INTRAVENOUS PRN
Status: DISCONTINUED | OUTPATIENT
Start: 2019-05-01 | End: 2019-05-01 | Stop reason: SDUPTHER

## 2019-05-01 RX ORDER — LABETALOL 20 MG/4 ML (5 MG/ML) INTRAVENOUS SYRINGE
5 EVERY 10 MIN PRN
Status: DISCONTINUED | OUTPATIENT
Start: 2019-05-01 | End: 2019-05-01 | Stop reason: HOSPADM

## 2019-05-01 RX ORDER — ONDANSETRON 2 MG/ML
4 INJECTION INTRAMUSCULAR; INTRAVENOUS
Status: DISCONTINUED | OUTPATIENT
Start: 2019-05-01 | End: 2019-05-01 | Stop reason: HOSPADM

## 2019-05-01 RX ORDER — FENTANYL CITRATE 50 UG/ML
INJECTION, SOLUTION INTRAMUSCULAR; INTRAVENOUS PRN
Status: DISCONTINUED | OUTPATIENT
Start: 2019-05-01 | End: 2019-05-01 | Stop reason: SDUPTHER

## 2019-05-01 RX ORDER — ACETAMINOPHEN 325 MG/1
650 TABLET ORAL EVERY 4 HOURS PRN
Status: DISCONTINUED | OUTPATIENT
Start: 2019-05-01 | End: 2019-05-06 | Stop reason: HOSPADM

## 2019-05-01 RX ORDER — FENTANYL CITRATE 50 UG/ML
50 INJECTION, SOLUTION INTRAMUSCULAR; INTRAVENOUS EVERY 5 MIN PRN
Status: DISCONTINUED | OUTPATIENT
Start: 2019-05-01 | End: 2019-05-01 | Stop reason: HOSPADM

## 2019-05-01 RX ORDER — SIMVASTATIN 10 MG
10 TABLET ORAL NIGHTLY
Status: DISCONTINUED | OUTPATIENT
Start: 2019-05-01 | End: 2019-05-06 | Stop reason: HOSPADM

## 2019-05-01 RX ORDER — LIDOCAINE HYDROCHLORIDE 20 MG/ML
INJECTION, SOLUTION INTRAVENOUS PRN
Status: DISCONTINUED | OUTPATIENT
Start: 2019-05-01 | End: 2019-05-01 | Stop reason: SDUPTHER

## 2019-05-01 RX ORDER — POLYETHYLENE GLYCOL 3350 17 G/17G
17 POWDER, FOR SOLUTION ORAL DAILY PRN
Status: DISCONTINUED | OUTPATIENT
Start: 2019-05-01 | End: 2019-05-03

## 2019-05-01 RX ORDER — FAMOTIDINE 20 MG/1
20 TABLET, FILM COATED ORAL 2 TIMES DAILY
Status: DISCONTINUED | OUTPATIENT
Start: 2019-05-01 | End: 2019-05-06 | Stop reason: HOSPADM

## 2019-05-01 RX ORDER — DEXAMETHASONE SODIUM PHOSPHATE 4 MG/ML
INJECTION, SOLUTION INTRA-ARTICULAR; INTRALESIONAL; INTRAMUSCULAR; INTRAVENOUS; SOFT TISSUE PRN
Status: DISCONTINUED | OUTPATIENT
Start: 2019-05-01 | End: 2019-05-01 | Stop reason: SDUPTHER

## 2019-05-01 RX ORDER — ROPIVACAINE HYDROCHLORIDE 5 MG/ML
INJECTION, SOLUTION EPIDURAL; INFILTRATION; PERINEURAL
Status: COMPLETED
Start: 2019-05-01 | End: 2019-05-01

## 2019-05-01 RX ORDER — SODIUM CHLORIDE 0.9 % (FLUSH) 0.9 %
10 SYRINGE (ML) INJECTION PRN
Status: DISCONTINUED | OUTPATIENT
Start: 2019-05-01 | End: 2019-05-06 | Stop reason: HOSPADM

## 2019-05-01 RX ORDER — LEVETIRACETAM 500 MG/1
500 TABLET ORAL NIGHTLY
Status: DISCONTINUED | OUTPATIENT
Start: 2019-05-01 | End: 2019-05-06 | Stop reason: HOSPADM

## 2019-05-01 RX ORDER — HYDROCODONE BITARTRATE AND ACETAMINOPHEN 5; 325 MG/1; MG/1
1 TABLET ORAL EVERY 4 HOURS PRN
Status: DISCONTINUED | OUTPATIENT
Start: 2019-05-01 | End: 2019-05-03

## 2019-05-01 RX ORDER — KETOROLAC TROMETHAMINE 15 MG/ML
15 INJECTION, SOLUTION INTRAMUSCULAR; INTRAVENOUS EVERY 6 HOURS
Status: COMPLETED | OUTPATIENT
Start: 2019-05-01 | End: 2019-05-02

## 2019-05-01 RX ORDER — HYDROCODONE BITARTRATE AND ACETAMINOPHEN 5; 325 MG/1; MG/1
2 TABLET ORAL EVERY 4 HOURS PRN
Status: DISCONTINUED | OUTPATIENT
Start: 2019-05-01 | End: 2019-05-03

## 2019-05-01 RX ORDER — OXYCODONE HYDROCHLORIDE AND ACETAMINOPHEN 5; 325 MG/1; MG/1
1 TABLET ORAL PRN
Status: DISCONTINUED | OUTPATIENT
Start: 2019-05-01 | End: 2019-05-01 | Stop reason: HOSPADM

## 2019-05-01 RX ORDER — MIDAZOLAM HYDROCHLORIDE 1 MG/ML
INJECTION INTRAMUSCULAR; INTRAVENOUS
Status: COMPLETED
Start: 2019-05-01 | End: 2019-05-01

## 2019-05-01 RX ORDER — DOCUSATE SODIUM 100 MG/1
100 CAPSULE, LIQUID FILLED ORAL 2 TIMES DAILY
Status: DISCONTINUED | OUTPATIENT
Start: 2019-05-01 | End: 2019-05-06 | Stop reason: HOSPADM

## 2019-05-01 RX ORDER — OXYCODONE HYDROCHLORIDE AND ACETAMINOPHEN 5; 325 MG/1; MG/1
2 TABLET ORAL PRN
Status: DISCONTINUED | OUTPATIENT
Start: 2019-05-01 | End: 2019-05-01 | Stop reason: HOSPADM

## 2019-05-01 RX ORDER — DEXAMETHASONE SODIUM PHOSPHATE 4 MG/ML
6 INJECTION, SOLUTION INTRA-ARTICULAR; INTRALESIONAL; INTRAMUSCULAR; INTRAVENOUS; SOFT TISSUE ONCE
Status: COMPLETED | OUTPATIENT
Start: 2019-05-01 | End: 2019-05-01

## 2019-05-01 RX ADMIN — HYDROMORPHONE HYDROCHLORIDE 0.5 MG: 1 INJECTION, SOLUTION INTRAMUSCULAR; INTRAVENOUS; SUBCUTANEOUS at 14:10

## 2019-05-01 RX ADMIN — LIDOCAINE HYDROCHLORIDE 50 MG: 20 INJECTION, SOLUTION INTRAVENOUS at 10:41

## 2019-05-01 RX ADMIN — SODIUM CHLORIDE, SODIUM LACTATE, POTASSIUM CHLORIDE, AND CALCIUM CHLORIDE: 600; 310; 30; 20 INJECTION, SOLUTION INTRAVENOUS at 09:49

## 2019-05-01 RX ADMIN — ROPIVACAINE HYDROCHLORIDE 30 ML: 5 INJECTION, SOLUTION EPIDURAL; INFILTRATION; PERINEURAL at 09:46

## 2019-05-01 RX ADMIN — FENTANYL CITRATE 50 MCG: 50 INJECTION INTRAMUSCULAR; INTRAVENOUS at 10:41

## 2019-05-01 RX ADMIN — DEXAMETHASONE SODIUM PHOSPHATE 6 MG: 4 INJECTION, SOLUTION INTRAMUSCULAR; INTRAVENOUS at 20:28

## 2019-05-01 RX ADMIN — KETOROLAC TROMETHAMINE 15 MG: 15 INJECTION, SOLUTION INTRAMUSCULAR; INTRAVENOUS at 20:35

## 2019-05-01 RX ADMIN — FENTANYL CITRATE 25 MCG: 50 INJECTION INTRAMUSCULAR; INTRAVENOUS at 12:17

## 2019-05-01 RX ADMIN — SODIUM CHLORIDE, POTASSIUM CHLORIDE, SODIUM LACTATE AND CALCIUM CHLORIDE: 600; 310; 30; 20 INJECTION, SOLUTION INTRAVENOUS at 09:07

## 2019-05-01 RX ADMIN — ASPIRIN 81 MG 81 MG: 81 TABLET ORAL at 20:28

## 2019-05-01 RX ADMIN — ROCURONIUM BROMIDE 20 MG: 10 INJECTION, SOLUTION INTRAVENOUS at 11:07

## 2019-05-01 RX ADMIN — Medication 3 MG: at 12:48

## 2019-05-01 RX ADMIN — ONDANSETRON 4 MG: 2 INJECTION INTRAMUSCULAR; INTRAVENOUS at 10:58

## 2019-05-01 RX ADMIN — SODIUM CHLORIDE: 9 INJECTION, SOLUTION INTRAVENOUS at 15:00

## 2019-05-01 RX ADMIN — PROPOFOL 60 MG: 10 INJECTION, EMULSION INTRAVENOUS at 11:09

## 2019-05-01 RX ADMIN — PROPOFOL 40 MG: 10 INJECTION, EMULSION INTRAVENOUS at 11:38

## 2019-05-01 RX ADMIN — KETOROLAC TROMETHAMINE 15 MG: 15 INJECTION, SOLUTION INTRAMUSCULAR; INTRAVENOUS at 14:00

## 2019-05-01 RX ADMIN — ONDANSETRON 4 MG: 2 INJECTION INTRAMUSCULAR; INTRAVENOUS at 17:57

## 2019-05-01 RX ADMIN — OXYCODONE HYDROCHLORIDE 10 MG: 10 TABLET, FILM COATED, EXTENDED RELEASE ORAL at 09:08

## 2019-05-01 RX ADMIN — FAMOTIDINE 20 MG: 20 TABLET ORAL at 20:28

## 2019-05-01 RX ADMIN — HYDROMORPHONE HYDROCHLORIDE 0.5 MG: 1 INJECTION, SOLUTION INTRAMUSCULAR; INTRAVENOUS; SUBCUTANEOUS at 14:00

## 2019-05-01 RX ADMIN — Medication 0.4 MG: at 12:48

## 2019-05-01 RX ADMIN — PROPOFOL 100 MG: 10 INJECTION, EMULSION INTRAVENOUS at 10:41

## 2019-05-01 RX ADMIN — DOCUSATE SODIUM 100 MG: 100 CAPSULE, LIQUID FILLED ORAL at 20:27

## 2019-05-01 RX ADMIN — SODIUM CHLORIDE, SODIUM LACTATE, POTASSIUM CHLORIDE, AND CALCIUM CHLORIDE: 600; 310; 30; 20 INJECTION, SOLUTION INTRAVENOUS at 12:20

## 2019-05-01 RX ADMIN — ROCURONIUM BROMIDE 30 MG: 10 INJECTION, SOLUTION INTRAVENOUS at 10:43

## 2019-05-01 RX ADMIN — TRANEXAMIC ACID 890 MG: 1 INJECTION, SOLUTION INTRAVENOUS at 10:49

## 2019-05-01 RX ADMIN — DEXAMETHASONE SODIUM PHOSPHATE 10 MG: 4 INJECTION, SOLUTION INTRAMUSCULAR; INTRAVENOUS at 09:13

## 2019-05-01 RX ADMIN — LEVETIRACETAM 500 MG: 500 TABLET ORAL at 20:27

## 2019-05-01 RX ADMIN — DEXAMETHASONE SODIUM PHOSPHATE 4 MG: 4 INJECTION, SOLUTION INTRAMUSCULAR; INTRAVENOUS at 10:58

## 2019-05-01 RX ADMIN — MIDAZOLAM 2 MG: 1 INJECTION INTRAMUSCULAR; INTRAVENOUS at 09:28

## 2019-05-01 RX ADMIN — FENTANYL CITRATE 50 MCG: 50 INJECTION INTRAMUSCULAR; INTRAVENOUS at 11:38

## 2019-05-01 RX ADMIN — SIMVASTATIN 10 MG: 10 TABLET, FILM COATED ORAL at 20:28

## 2019-05-01 RX ADMIN — Medication 2 G: at 10:37

## 2019-05-01 RX ADMIN — FENTANYL CITRATE 50 MCG: 50 INJECTION INTRAMUSCULAR; INTRAVENOUS at 11:08

## 2019-05-01 ASSESSMENT — PULMONARY FUNCTION TESTS
PIF_VALUE: 15
PIF_VALUE: 14
PIF_VALUE: 15
PIF_VALUE: 3
PIF_VALUE: 14
PIF_VALUE: 15
PIF_VALUE: 5
PIF_VALUE: 15
PIF_VALUE: 0
PIF_VALUE: 15
PIF_VALUE: 15
PIF_VALUE: 16
PIF_VALUE: 14
PIF_VALUE: 16
PIF_VALUE: 15
PIF_VALUE: 14
PIF_VALUE: 15
PIF_VALUE: 2
PIF_VALUE: 15
PIF_VALUE: 3
PIF_VALUE: 1
PIF_VALUE: 14
PIF_VALUE: 15
PIF_VALUE: 2
PIF_VALUE: 15
PIF_VALUE: 16
PIF_VALUE: 15
PIF_VALUE: 1
PIF_VALUE: 10
PIF_VALUE: 15
PIF_VALUE: 3
PIF_VALUE: 15
PIF_VALUE: 1
PIF_VALUE: 16
PIF_VALUE: 16
PIF_VALUE: 15
PIF_VALUE: 16
PIF_VALUE: 3
PIF_VALUE: 15
PIF_VALUE: 15
PIF_VALUE: 1
PIF_VALUE: 15
PIF_VALUE: 3
PIF_VALUE: 1
PIF_VALUE: 3
PIF_VALUE: 15
PIF_VALUE: 2
PIF_VALUE: 3
PIF_VALUE: 15
PIF_VALUE: 15
PIF_VALUE: 16
PIF_VALUE: 15
PIF_VALUE: 0
PIF_VALUE: 3
PIF_VALUE: 16
PIF_VALUE: 15
PIF_VALUE: 1
PIF_VALUE: 15
PIF_VALUE: 14
PIF_VALUE: 16
PIF_VALUE: 15
PIF_VALUE: 14
PIF_VALUE: 3
PIF_VALUE: 16
PIF_VALUE: 17
PIF_VALUE: 3
PIF_VALUE: 2
PIF_VALUE: 16
PIF_VALUE: 2
PIF_VALUE: 16
PIF_VALUE: 15
PIF_VALUE: 16
PIF_VALUE: 15
PIF_VALUE: 9
PIF_VALUE: 2
PIF_VALUE: 13
PIF_VALUE: 17
PIF_VALUE: 17
PIF_VALUE: 15
PIF_VALUE: 14
PIF_VALUE: 17
PIF_VALUE: 15
PIF_VALUE: 16
PIF_VALUE: 15
PIF_VALUE: 15
PIF_VALUE: 5
PIF_VALUE: 15
PIF_VALUE: 16
PIF_VALUE: 2
PIF_VALUE: 16
PIF_VALUE: 15
PIF_VALUE: 16
PIF_VALUE: 15
PIF_VALUE: 15
PIF_VALUE: 5
PIF_VALUE: 15
PIF_VALUE: 15
PIF_VALUE: 18
PIF_VALUE: 15
PIF_VALUE: 3
PIF_VALUE: 14
PIF_VALUE: 15
PIF_VALUE: 13
PIF_VALUE: 15
PIF_VALUE: 1
PIF_VALUE: 15
PIF_VALUE: 16
PIF_VALUE: 15
PIF_VALUE: 2
PIF_VALUE: 16
PIF_VALUE: 15
PIF_VALUE: 2
PIF_VALUE: 15
PIF_VALUE: 5
PIF_VALUE: 17
PIF_VALUE: 3
PIF_VALUE: 15
PIF_VALUE: 15
PIF_VALUE: 16
PIF_VALUE: 15
PIF_VALUE: 14

## 2019-05-01 ASSESSMENT — PAIN DESCRIPTION - ONSET
ONSET: ON-GOING

## 2019-05-01 ASSESSMENT — PAIN DESCRIPTION - ORIENTATION
ORIENTATION: RIGHT

## 2019-05-01 ASSESSMENT — LIFESTYLE VARIABLES: SMOKING_STATUS: 0

## 2019-05-01 ASSESSMENT — PAIN SCALES - GENERAL
PAINLEVEL_OUTOF10: 0
PAINLEVEL_OUTOF10: 1
PAINLEVEL_OUTOF10: 3
PAINLEVEL_OUTOF10: 9
PAINLEVEL_OUTOF10: 0
PAINLEVEL_OUTOF10: 3
PAINLEVEL_OUTOF10: 0
PAINLEVEL_OUTOF10: 10
PAINLEVEL_OUTOF10: 4
PAINLEVEL_OUTOF10: 0
PAINLEVEL_OUTOF10: 2
PAINLEVEL_OUTOF10: 0

## 2019-05-01 ASSESSMENT — PAIN DESCRIPTION - PAIN TYPE
TYPE: CHRONIC PAIN
TYPE: SURGICAL PAIN
TYPE: CHRONIC PAIN
TYPE: SURGICAL PAIN
TYPE: SURGICAL PAIN

## 2019-05-01 ASSESSMENT — PAIN DESCRIPTION - LOCATION
LOCATION: HIP

## 2019-05-01 ASSESSMENT — PAIN DESCRIPTION - DESCRIPTORS
DESCRIPTORS: DISCOMFORT
DESCRIPTORS: DISCOMFORT
DESCRIPTORS: ACHING

## 2019-05-01 ASSESSMENT — PAIN - FUNCTIONAL ASSESSMENT
PAIN_FUNCTIONAL_ASSESSMENT: 0-10
PAIN_FUNCTIONAL_ASSESSMENT: PREVENTS OR INTERFERES SOME ACTIVE ACTIVITIES AND ADLS

## 2019-05-01 ASSESSMENT — PAIN DESCRIPTION - FREQUENCY
FREQUENCY: CONTINUOUS

## 2019-05-01 ASSESSMENT — PAIN DESCRIPTION - PROGRESSION
CLINICAL_PROGRESSION: NOT CHANGED
CLINICAL_PROGRESSION: GRADUALLY IMPROVING
CLINICAL_PROGRESSION: NOT CHANGED

## 2019-05-01 NOTE — BRIEF OP NOTE
Brief Postoperative Note  ______________________________________________________________    Patient: Diallo Fowler  YOB: 1934  MRN: [id number]  Date of Procedure: 5/1/2019    Pre-Op Diagnosis: OSTEOARTHRITIS RIGHT HIP    Post-Op Diagnosis: Same       Procedure(s):  RIGHT HIP TOTAL ARTHROPLASTY    Anesthesia: Regional, General    Surgeon(s):  MD Angelo Isaac DO    Assistant: Laura Lincoln    Estimated Blood Loss (mL): 439 mL    Complications: None    Specimens:   * No specimens in log *    Implants:  See operative report      Drains: none    Findings: see operative report    Angelo Camejo DO  Date: 5/1/2019  Time: 12:46 PM

## 2019-05-01 NOTE — PROGRESS NOTES
Anesthesia Dr. Mckenzie Felix here to do Right Fascia Ileacus block. O2 placed 2L N/C  Start time:0926  Stop ONLJ:1348  Tolerated Well    See flow sheet for vital signs.

## 2019-05-01 NOTE — ANESTHESIA PROCEDURE NOTES
Peripheral Block    Patient location during procedure: pre-op  Staffing  Anesthesiologist: Boom Child MD  Performed: anesthesiologist   Preanesthetic Checklist  Completed: patient identified, site marked, surgical consent, pre-op evaluation, timeout performed, IV checked, risks and benefits discussed, monitors and equipment checked, anesthesia consent given, oxygen available and patient being monitored  Peripheral Block  Patient position: supine  Prep: ChloraPrep  Patient monitoring: cardiac monitor, continuous pulse ox, frequent blood pressure checks and IV access  Block type: Femoral and Fascia iliaca  Laterality: right  Injection technique: single-shot  Procedures: ultrasound guided  Local infiltration: lidocaine  Infiltration strength: 1 %  Dose: 3 mL  Provider prep: mask and sterile gloves  Local infiltration: lidocaine  Needle  Needle gauge: 21 G  Needle length: 10 cm  Needle localization: ultrasound guidance  Assessment  Injection assessment: negative aspiration for heme, no paresthesia on injection and local visualized surrounding nerve on ultrasound  Paresthesia pain: none  Slow fractionated injection: yes  Hemodynamics: stable  Additional Notes  Immediately prior to procedure a \"time out\" was called to verify the correct patient, allergies, laterality, procedure and equipment. Time out performed with  RN    Local Anesthetic: 0.5 %  BRopivacaine   Amount: 30 ml  in 5 ml increments after negative aspiration each time.         Reason for block: post-op pain management and at surgeon's request

## 2019-05-01 NOTE — PROGRESS NOTES
Report given to [facility] RN receiving patient on 5 tower in PACU at 0383 with no further questions noted. [facility] aware that patient received a pre-op nerve block but has full sensation in her right leg. She is also aware that patient has not urinated since her arrival to PACU.

## 2019-05-01 NOTE — ANESTHESIA PRE PROCEDURE
Department of Anesthesiology  Preprocedure Note       Name:  Liliana Toledo   Age:  80 y.o.  :  1934                                          MRN:  [id number]         Date:  2019      Surgeon: Arabella Smith):  Zack Monique MD    Procedure: RIGHT HIP TOTAL ARTHROPLASTY (Right )    Medications prior to admission:   Prior to Admission medications    Medication Sig Start Date End Date Taking? Authorizing Provider   levETIRAcetam (KEPPRA) 250 MG tablet Take 250 mg by mouth One tab in a.m. And 2 tabs at bedtime   Yes Historical Provider, MD   simvastatin (ZOCOR) 10 MG tablet Take 1 tablet by mouth nightly 19  Yes Felipe Calabrese MD   lisinopril (PRINIVIL;ZESTRIL) 20 MG tablet Take 1 tablet by mouth daily  Patient taking differently: Take 20 mg by mouth nightly  19  Yes Felipe Calabrese MD   Cholecalciferol (VITAMIN D3) 1000 units TABS Take by mouth nightly    Yes Historical Provider, MD   Magnesium Hydroxide (MAGNESIA PO) Take 500 mg by mouth nightly    Yes Historical Provider, MD   aspirin 81 MG tablet Take 81 mg by mouth nightly     Historical Provider, MD       Current medications:    Current Facility-Administered Medications   Medication Dose Route Frequency Provider Last Rate Last Dose    lactated ringers infusion   Intravenous Continuous Ulisses Marquez  mL/hr at 19 0907      ceFAZolin (ANCEF) 2 g in dextrose 5 % 50 mL IVPB  2 g Intravenous Once Zack Monique MD        lactated ringers infusion   Intravenous Continuous Zack Monique MD        ortho mix (with morphine) injection   Injection On Call Zack Monique MD        tranexamic acid (CYKLOKAPRON) 890 mg in sodium chloride 0.9 % 50 mL IVPB  890 mg Intravenous Once Zack Monique MD           Allergies:     Allergies   Allergen Reactions    Oxycodone Other (See Comments)     Depression    Norvasc [Amlodipine Besylate] Anxiety       Problem List: Patient Active Problem List   Diagnosis Code   (none) - all problems resolved or deleted       Past Medical History:        Diagnosis Date    Arthritis     Edema 04/22/2019    lower legs and feet    Hypercholesteremia     Hypertension     Seizure ([address]     had few episodes of probable nocturnal seizures (7/2015, 8/2018, 3/2019), negative eeg and MRI       Past Surgical History:        Procedure Laterality Date    APPENDECTOMY      COLONOSCOPY      JOINT REPLACEMENT      left    SALPINGO-OOPHORECTOMY  12/11/2017    robotic, and hysteroscopy d&c, polypectomy        Social History:    Social History     Tobacco Use    Smoking status: Never Smoker    Smokeless tobacco: Never Used   Substance Use Topics    Alcohol use: No                                Counseling given: Not Answered      Vital Signs (Current):   Vitals:    05/01/19 0930 05/01/19 0935 05/01/19 0940 05/01/19 0945   BP: (!) 147/61 (!) 140/75 132/65 119/63   Pulse: 83 90 89 90   Resp: 16 16 16 16   Temp:       TempSrc:       SpO2: 100% 100% 100% 100%   Weight:       Height:                                                  BP Readings from Last 3 Encounters:   05/01/19 119/63   04/22/19 134/63   04/24/19 114/60       NPO Status:                                                                                 BMI:   Wt Readings from Last 3 Encounters:   05/01/19 130 lb (59 kg)   04/24/19 130 lb (59 kg)   04/23/19 130 lb (59 kg)     Body mass index is 20.98 kg/m².     CBC:   Lab Results   Component Value Date    WBC 6.9 04/22/2019    RBC 4.29 04/22/2019    HGB 12.7 04/22/2019    HCT 39.1 04/22/2019    MCV 91.2 04/22/2019    RDW 15.3 04/22/2019     04/22/2019       CMP:   Lab Results   Component Value Date     04/22/2019    K 5.2 04/22/2019     04/22/2019    CO2 29 04/22/2019    BUN 15 04/22/2019    CREATININE 0.7 04/22/2019    GFRAA >60 04/22/2019    AGRATIO 1.2 11/27/2017    LABGLOM >60 04/22/2019    GLUCOSE 93 04/22/2019    PROT 6.8 04/22/2019    CALCIUM 9.7 04/22/2019    BILITOT 0.4 04/22/2019    ALKPHOS 43 04/22/2019    AST 15 04/22/2019    ALT 10 04/22/2019       POC Tests: No results for input(s): POCGLU, POCNA, POCK, POCCL, POCBUN, POCHEMO, POCHCT in the last 72 hours. Coags:   Lab Results   Component Value Date    PROTIME 10.9 04/22/2019    INR 0.96 04/22/2019    APTT 34.6 04/22/2019       HCG (If Applicable): No results found for: PREGTESTUR, PREGSERUM, HCG, HCGQUANT     ABGs: No results found for: PHART, PO2ART, HUF1OAZ, TGY4BVM, BEART, O7DNRMHU     Type & Screen (If Applicable):  No results found for: LABABO, LABRH    Anesthesia Evaluation    Airway: Mallampati: II  TM distance: >3 FB   Neck ROM: full  Mouth opening: > = 3 FB Dental: normal exam         Pulmonary: breath sounds clear to auscultation      (-) COPD, asthma and not a current smoker                           Cardiovascular:    (+) hypertension:, hyperlipidemia    (-) past MI, CAD, CABG/stent, dysrhythmias,  angina,  CHF, orthopnea, PND and  TORRES      Rhythm: regular  Rate: normal           Beta Blocker:  Not on Beta Blocker         Neuro/Psych:   (+) seizures:,    (-) TIA, CVA and depression/anxiety             ROS comment: Nocturnal seizures   GI/Hepatic/Renal:        (-) GERD, PUD and hepatitis       Endo/Other:    (+) : arthritis:., no malignancy/cancer. (-) diabetes mellitus, no electrolyte abnormalities, no malignancy/cancer               Abdominal:           Vascular:     - DVT and PE. Anesthesia Plan      general and regional     ASA 2       Induction: intravenous. MIPS: Postoperative opioids intended and Prophylactic antiemetics administered. Anesthetic plan and risks discussed with patient. Plan discussed with CRNA.                   Erika Rivers MD   5/1/2019

## 2019-05-01 NOTE — H&P
[facility] Same Day Surgery Update H & P  Department of General Surgery   Surgical Service   Pre-operative History and Physical  Last H & P within the last 30 days. DIAGNOSIS:   OSTEOARTHRITIS RIGHT HIP    PROCEDURE:  VT TOTAL HIP ARTHROPLASTY [73414] (HIP TOTAL ARTHROPLASTY)     HISTORY OF PRESENT ILLNESS:    Patient with chronic right hip pain and limited ROM in the setting of arthrosis. The symptoms have been recalcitrant to conservative treatment and the patient presents today for the above procedure. Past Medical History:        Diagnosis Date    Arthritis     Edema 04/22/2019    lower legs and feet    Hypercholesteremia     Hypertension     Seizure ([address]     had few episodes of probable nocturnal seizures (7/2015, 8/2018, 3/2019), negative eeg and MRI     Past Surgical History:        Procedure Laterality Date    APPENDECTOMY      COLONOSCOPY      JOINT REPLACEMENT      left    SALPINGO-OOPHORECTOMY  12/11/2017    robotic, and hysteroscopy d&c, polypectomy      Past Social History:  Social History     Socioeconomic History    Marital status:       Spouse name: Not on file    Number of children: Not on file    Years of education: Not on file    Highest education level: Not on file   Occupational History    Not on file   Social Needs    Financial resource strain: Not on file    Food insecurity:     Worry: Not on file     Inability: Not on file    Transportation needs:     Medical: Not on file     Non-medical: Not on file   Tobacco Use    Smoking status: Never Smoker    Smokeless tobacco: Never Used   Substance and Sexual Activity    Alcohol use: No    Drug use: No    Sexual activity: Not on file   Lifestyle    Physical activity:     Days per week: Not on file     Minutes per session: Not on file    Stress: Not on file   Relationships    Social connections:     Talks on phone: Not on file     Gets together: Not on file     Attends Rastafari service: Not on file     Active member of club or organization: Not on file     Attends meetings of clubs or organizations: Not on file     Relationship status: Not on file    Intimate partner violence:     Fear of current or ex partner: Not on file     Emotionally abused: Not on file     Physically abused: Not on file     Forced sexual activity: Not on file   Other Topics Concern    Not on file   Social History Narrative    Not on file         Medications Prior to Admission:      Prior to Admission medications    Medication Sig Start Date End Date Taking? Authorizing Provider   levETIRAcetam (KEPPRA) 250 MG tablet Take 250 mg by mouth One tab in a.m. And 2 tabs at bedtime    Historical Provider, MD   simvastatin (ZOCOR) 10 MG tablet Take 1 tablet by mouth nightly 2/7/19   Aaliyah Barnett MD   lisinopril (PRINIVIL;ZESTRIL) 20 MG tablet Take 1 tablet by mouth daily  Patient taking differently: Take 20 mg by mouth nightly  2/7/19   Aaliyah Barnett MD   Cholecalciferol (VITAMIN D3) 1000 units TABS Take by mouth nightly     Historical Provider, MD   Magnesium Hydroxide (MAGNESIA PO) Take 500 mg by mouth nightly     Historical Provider, MD   aspirin 81 MG tablet Take 81 mg by mouth nightly     Historical Provider, MD         Allergies:  Oxycodone and Norvasc [amlodipine besylate]    PHYSICAL EXAM:      /70   Pulse 80   Temp 97 °F (36.1 °C) (Oral)   Resp 16   Ht 5' 6\" (1.676 m)   Wt 130 lb (59 kg)   SpO2 98%   BMI 20.98 kg/m²      Heart:  Regular rate and rhythm, No murmur noted    Lungs: No increased work of breathing, good air exchange, clear to ausculation bilaterally     Abdomen:  Soft, non-distended, non-tender, normal active bowel sounds, no masses palpated    ASSESSMENT AND PLAN:    1. Patient seen and focused exam done today- no new changes since last physical exam on 4/23/19     2. Access to ancillary services are available per request of the provider.     Lindsay Orosco Jermaine Esqueda, APRN - CNP     5/1/2019

## 2019-05-01 NOTE — PROGRESS NOTES
Patient admitted to PACU # 16 from OR at 1317 post left THR per Dr. Tu Hale. Attached to PACU monitoring system and report received from CRNA. Patient was hemodynamically stable during surgical procedure. Arrived in PACU drowsy and with no initial c/o pain or any evidence of pain. Ice to right hip.

## 2019-05-02 LAB
HCT VFR BLD CALC: 28.4 % (ref 36–48)
HEMOGLOBIN: 9.5 G/DL (ref 12–16)
MCH RBC QN AUTO: 30.5 PG (ref 26–34)
MCHC RBC AUTO-ENTMCNC: 33.4 G/DL (ref 31–36)
MCV RBC AUTO: 91.4 FL (ref 80–100)
PDW BLD-RTO: 15 % (ref 12.4–15.4)
PLATELET # BLD: 190 K/UL (ref 135–450)
PMV BLD AUTO: 8.5 FL (ref 5–10.5)
RBC # BLD: 3.11 M/UL (ref 4–5.2)
WBC # BLD: 9.2 K/UL (ref 4–11)

## 2019-05-02 PROCEDURE — 97530 THERAPEUTIC ACTIVITIES: CPT

## 2019-05-02 PROCEDURE — 97535 SELF CARE MNGMENT TRAINING: CPT

## 2019-05-02 PROCEDURE — 36415 COLL VENOUS BLD VENIPUNCTURE: CPT

## 2019-05-02 PROCEDURE — 97161 PT EVAL LOW COMPLEX 20 MIN: CPT

## 2019-05-02 PROCEDURE — 1200000000 HC SEMI PRIVATE

## 2019-05-02 PROCEDURE — 2580000003 HC RX 258: Performed by: ORTHOPAEDIC SURGERY

## 2019-05-02 PROCEDURE — 85027 COMPLETE CBC AUTOMATED: CPT

## 2019-05-02 PROCEDURE — 6360000002 HC RX W HCPCS: Performed by: ORTHOPAEDIC SURGERY

## 2019-05-02 PROCEDURE — 97116 GAIT TRAINING THERAPY: CPT

## 2019-05-02 PROCEDURE — 6370000000 HC RX 637 (ALT 250 FOR IP): Performed by: ORTHOPAEDIC SURGERY

## 2019-05-02 PROCEDURE — 97166 OT EVAL MOD COMPLEX 45 MIN: CPT

## 2019-05-02 PROCEDURE — 97110 THERAPEUTIC EXERCISES: CPT

## 2019-05-02 RX ORDER — ASPIRIN 325 MG
325 TABLET, DELAYED RELEASE (ENTERIC COATED) ORAL 2 TIMES DAILY WITH MEALS
Qty: 60 TABLET | Refills: 11 | Status: SHIPPED | OUTPATIENT
Start: 2019-05-02 | End: 2019-05-15 | Stop reason: ALTCHOICE

## 2019-05-02 RX ORDER — HYDROCODONE BITARTRATE AND ACETAMINOPHEN 7.5; 325 MG/1; MG/1
1-2 TABLET ORAL
Qty: 28 TABLET | Refills: 0 | Status: SHIPPED | OUTPATIENT
Start: 2019-05-02 | End: 2019-05-03 | Stop reason: HOSPADM

## 2019-05-02 RX ORDER — PSEUDOEPHEDRINE HCL 30 MG
100 TABLET ORAL 2 TIMES DAILY
Qty: 60 CAPSULE | Refills: 0 | Status: SHIPPED | OUTPATIENT
Start: 2019-05-02 | End: 2019-08-16 | Stop reason: ALTCHOICE

## 2019-05-02 RX ADMIN — FAMOTIDINE 20 MG: 20 TABLET ORAL at 20:59

## 2019-05-02 RX ADMIN — DOCUSATE SODIUM 100 MG: 100 CAPSULE, LIQUID FILLED ORAL at 09:58

## 2019-05-02 RX ADMIN — SIMVASTATIN 10 MG: 10 TABLET, FILM COATED ORAL at 20:59

## 2019-05-02 RX ADMIN — HYDROCODONE BITARTRATE AND ACETAMINOPHEN 1 TABLET: 5; 325 TABLET ORAL at 21:15

## 2019-05-02 RX ADMIN — LEVETIRACETAM 250 MG: 500 TABLET ORAL at 09:58

## 2019-05-02 RX ADMIN — KETOROLAC TROMETHAMINE 15 MG: 15 INJECTION, SOLUTION INTRAMUSCULAR; INTRAVENOUS at 06:30

## 2019-05-02 RX ADMIN — Medication 10 ML: at 09:59

## 2019-05-02 RX ADMIN — LEVETIRACETAM 500 MG: 500 TABLET ORAL at 20:58

## 2019-05-02 RX ADMIN — DEXAMETHASONE SODIUM PHOSPHATE 3 MG: 4 INJECTION, SOLUTION INTRAMUSCULAR; INTRAVENOUS at 18:11

## 2019-05-02 RX ADMIN — HYDROCODONE BITARTRATE AND ACETAMINOPHEN 1 TABLET: 5; 325 TABLET ORAL at 13:39

## 2019-05-02 RX ADMIN — DOCUSATE SODIUM 100 MG: 100 CAPSULE, LIQUID FILLED ORAL at 20:58

## 2019-05-02 RX ADMIN — KETOROLAC TROMETHAMINE 15 MG: 15 INJECTION, SOLUTION INTRAMUSCULAR; INTRAVENOUS at 13:46

## 2019-05-02 RX ADMIN — ASPIRIN 81 MG 81 MG: 81 TABLET ORAL at 20:59

## 2019-05-02 RX ADMIN — KETOROLAC TROMETHAMINE 15 MG: 15 INJECTION, SOLUTION INTRAMUSCULAR; INTRAVENOUS at 03:04

## 2019-05-02 RX ADMIN — DEXAMETHASONE SODIUM PHOSPHATE 3 MG: 4 INJECTION, SOLUTION INTRAMUSCULAR; INTRAVENOUS at 04:59

## 2019-05-02 RX ADMIN — Medication 10 ML: at 21:00

## 2019-05-02 RX ADMIN — ENOXAPARIN SODIUM 40 MG: 40 INJECTION SUBCUTANEOUS at 09:58

## 2019-05-02 RX ADMIN — CEFAZOLIN SODIUM 2 G: 1 POWDER, FOR SOLUTION INTRAMUSCULAR; INTRAVENOUS at 03:04

## 2019-05-02 ASSESSMENT — PAIN DESCRIPTION - LOCATION
LOCATION: HIP

## 2019-05-02 ASSESSMENT — PAIN DESCRIPTION - PROGRESSION
CLINICAL_PROGRESSION: NOT CHANGED
CLINICAL_PROGRESSION: GRADUALLY WORSENING

## 2019-05-02 ASSESSMENT — PAIN DESCRIPTION - ORIENTATION
ORIENTATION: RIGHT

## 2019-05-02 ASSESSMENT — PAIN DESCRIPTION - PAIN TYPE
TYPE: SURGICAL PAIN
TYPE: ACUTE PAIN

## 2019-05-02 ASSESSMENT — PAIN DESCRIPTION - DESCRIPTORS
DESCRIPTORS: ACHING
DESCRIPTORS: ACHING
DESCRIPTORS: ACHING;CONSTANT
DESCRIPTORS: ACHING

## 2019-05-02 ASSESSMENT — PAIN SCALES - GENERAL
PAINLEVEL_OUTOF10: 4
PAINLEVEL_OUTOF10: 5
PAINLEVEL_OUTOF10: 2
PAINLEVEL_OUTOF10: 2
PAINLEVEL_OUTOF10: 0
PAINLEVEL_OUTOF10: 1
PAINLEVEL_OUTOF10: 4

## 2019-05-02 ASSESSMENT — PAIN - FUNCTIONAL ASSESSMENT: PAIN_FUNCTIONAL_ASSESSMENT: ACTIVITIES ARE NOT PREVENTED

## 2019-05-02 ASSESSMENT — PAIN DESCRIPTION - FREQUENCY
FREQUENCY: CONTINUOUS

## 2019-05-02 ASSESSMENT — PAIN DESCRIPTION - ONSET
ONSET: ON-GOING

## 2019-05-02 NOTE — PLAN OF CARE
Problem: Falls - Risk of:  Goal: Will remain free from falls  Description  Patient will remain free of falls throughout the duration of the shift. Bed locked in lowest position. Non skid socks on. Bed and chair alarm activated. Call light and belongings within reach. Patient calls out approprietly. Room door open. x1 assist with walker. Will continue to monitor and reassess. Outcome: Ongoing     Problem: Pain:  Goal: Pain level will decrease  Description  Patient's right hip surgical pain will remain at a manageable level throughout the duration of the shift. Patient understands prescribed pain medication regimen for its uses and side effects. Patient understands how to rate pain appropriately and when to call out if experiencing breakthrough pain. Patient understands how to utilize non pharmacological pain management techniques. Pain last rated at 4/10. Patient medicated per [facility] with PO pain medication and IV toradol. Pain well controlled at this time. Will continue to monitor and reassess. Outcome: Ongoing     Problem: Gas Exchange - Impaired:  Goal: Levels of oxygenation will improve  Description   Levels of oxygenation will improve. Patient educated on risks of post-operative atelectasis. RN introduced to incentive spirometer and patient demonstrated understanding. RN encouraged its use. Lungs clear upon auscultation, patient denies shortness of breath. O2 saturation stable on room air. Will continue to monitor and reassess. Outcome: Ongoing     Problem: Bowel/Gastric:  Goal: Ability to achieve a regular elimination pattern will improve  Description  Ability to achieve a regular elimination pattern will improve. Patient at risk for constipation due to surgery and opioid use for pain management. Patient states last bowel movement was 5/1. Patient states they are passing gas, bowel sounds are active upon auscultation. Patient is tolerating PO intake, RN encouraged fluids.  Patient medicated per [facility] with

## 2019-05-02 NOTE — PROGRESS NOTES
No acute events overnight. Pt has stood and pivoted x2 assist with gb and walker to commode to void. Pt states nausea has resolved, although she has not eaten yet. Pt states she is not hungry. Dressing is CD&I.

## 2019-05-02 NOTE — OP NOTE
The hip dislocated. First, the whole neck  was covered with osteophytes that was equal to diameter of the head and  the head was eburnated. Neck cut was made and head was delivered from  the wound. The acetabulum was exposed. The _____ was closed with  osteophytes. Some remaining capsule was resected. Reaming was started  medially. This was unroofed _____ and then some more medial reaming was  done until the medial wall was reached. Ultimately, this was reamed up  to 52 mm, about 4 degrees abduction and 15 degrees of anteversion. A  trial cup was placed, seemed to fit nicely. There were large marginal  osteophytes, anterior-inferior, and pretty much posterior wall and so an  osteotome was taken, so these osteophytes were removed to prevent  impingement. Everything was thoroughly pulse lavaged. Three whole  press-fit cup was placed, one 20 mm fixation screw was used as a good  bite. Trial liner was placed. Box chisel was used to open up the  proximal femur, the canal finder was then used. This was reamed up to  10, broaches were used up to 10 and this was seemed to tight fit. Trial  reductions were done at 0, this was too tight, -3 was used, this reduced  nicely with the hip in extension. The knee flexed beyond 100 degrees  without undo quad tightness. Extension, adduction, external rotation  showed no signs of instability. Flexion at 90 degrees and at least 45  degrees of internal rotation showed no signs of instability. This  appeared to be equal leg lengths nicely. All trial parts were removed  and Irrisept soak was undertaken. Remaining manhole covers in the cup  were filled and then a 0 degree liner was placed and checked for  stability period. The canal was then irrigated and two prosthesis was  placed. This was flushed, so -3 head was placed after cleaning the  Restrepo taper and drying it. The hip was then reduced, placed in an  abduction table with pads.   Gluteus minimus and medius were reattached  using #2 Ethibond in figure-of-eight fashion. The IT band was closed  with a couple Ethibonds and remainder with the #1 Vicryl. Subcutaneous  tissue was closed with 2-0 Vicryl and the skin with 4-0 Monocryl. Steri-Strips and silver-impregnated dressing was applied. The patient  tolerated this procedure well. There were no known complications. She  was returned to recovery area in stable condition.         Filiberto Olmstead MD    D: 05/01/2019 12:38:19       T: 05/01/2019 15:53:07     [email or web address]  Job#: [id number]     Doc#: [id number]    CC:

## 2019-05-02 NOTE — PROGRESS NOTES
 Norvasc [Amlodipine Besylate] Anxiety     No current facility-administered medications on file prior to encounter. Current Outpatient Medications on File Prior to Encounter   Medication Sig Dispense Refill    lisinopril (PRINIVIL;ZESTRIL) 20 MG tablet Take 20 mg by mouth nightly      simvastatin (ZOCOR) 10 MG tablet Take 1 tablet by mouth nightly 30 tablet 5    Cholecalciferol (VITAMIN D3) 1000 units TABS Take by mouth nightly       Magnesium Hydroxide (MAGNESIA PO) Take 500 mg by mouth nightly       aspirin 81 MG tablet Take 81 mg by mouth nightly        family history is not on file.   Past Medical History:   Diagnosis Date    Arthritis     Edema 04/22/2019    lower legs and feet    Hypercholesteremia     Hypertension     Seizure ([facility] [address]     had few episodes of probable nocturnal seizures (7/2015, 8/2018, 3/2019), negative eeg and MRI     Past Medical History:   Diagnosis Date    Arthritis     Edema 04/22/2019    lower legs and feet    Hypercholesteremia     Hypertension     Seizure ([facility] [address]     had few episodes of probable nocturnal seizures (7/2015, 8/2018, 3/2019), negative eeg and MRI     Active Ambulatory Problems     Diagnosis Date Noted    No Active Ambulatory Problems     Resolved Ambulatory Problems     Diagnosis Date Noted    PMB (postmenopausal bleeding) 12/11/2017    Uterine polyp 12/11/2017    Ovarian cyst 12/11/2017    Tongue pain 07/03/2018    Acute cystitis without hematuria 07/03/2018     Past Medical History:   Diagnosis Date    Arthritis     Edema 04/22/2019    Hypercholesteremia     Hypertension     Seizure ([facility] [address]        Review of Systems:  Gen: No dizziness, fevers, chills  HEENT: Negative for double vision, visual changes  CV: Negative for chest pain, palpitations  Lungs: Negative for shortness of breath or wheezing  Abdomen: Negative for abdominal pain or bloating   Neurological: Negative for numbness, paresthesias, or weakness  Psychiatric: Displaying appropriate mentation, judgement, and decision making  Urological: Negative for urinary retention  Skin: warm, well perfused, dressing in place over hip    Vital Signs:  Vitals:    05/02/19 0301   BP: 109/63   Pulse: 69   Resp: 16   Temp: 97.5 °F (36.4 °C)   SpO2: 97%     Height: 5' 6\" (167.6 cm), Weight: 167 lb 8.8 oz (76 kg), Body mass index is 27.04 kg/m². ,      Physical Exam:     Appearance: Dolly Valdes is alert, oriented x 3, resting comforably, no acute distress. Right Hip Exam:  Dressing is clean, dry, and intact. Compartments are soft and compressible. Flexion and extension is intact although limited due to post operative status. Motor intact with knee flexion, knee extension, dorsiflexion, EHL, and plantar flexion. Sensation intact on the dorsal and plantar aspect of the foot. Negative Chris's sign. Compression stockings in place. +DP/PT pulse, foot warm with brisk capillary refill < 2 seconds. Assessment:  1). S/p right Total hip arthroplasty, surgery date 5/1/19    Treatment Plan:    1). Maintain dressing  2). Pain control PRN  3). DVT ppx  4). Ice/elevate PRN  5). Encourage diet and fluids  6). GI ppx  7). PT/OT, increase activity as tolerated per protocol, WBAT-hip precautions based on approach   8). Nutritional support  9). IM recommendations    Disposition: Anticipate discharge to [facility] pending patient progression and bed availability   -Call with any questions or concerns: [phone]      Bob Still.  Lynda Daniels, [address] [place] [address] [facility]

## 2019-05-02 NOTE — PROGRESS NOTES
Physical Therapy    Facility/Department: [facility] 5T ORTHO/NEURO  Initial Assessment and Treatment    NAME: Logan Childers  : 1934  MRN: [id number]    Date of Service: 2019    Discharge Recommendations:    Logan Childers scored a 17/24 on the AM-PAC short mobility form. Current research shows that an AM-PAC score of 17 or less is typically not associated with a discharge to the patient's home setting. Based on the patients AM-PAC score and their current functional mobility deficits, it is recommended that the patient have 3-5 sessions per week of Physical Therapy at d/c to increase the patients independence. PT Equipment Recommendations  Equipment Needed: No    Assessment   Body structures, Functions, Activity limitations: Decreased functional mobility ; Decreased ROM; Decreased strength;Decreased endurance  Assessment: Pt doing well post right NE, however is below normal level of functional independence. Lives alone and plans to go to SNF for short term rehab. Recommend continued therapies to maximize functional mobility and gait. Treatment Diagnosis: Decreased functional mobility post right NE  Prognosis: Good  Decision Making: Low Complexity  Patient Education: role of PT, KI, safety  Barriers to Learning: anxiety  REQUIRES PT FOLLOW UP: Yes  Activity Tolerance  Activity Tolerance: Pt anxious during treatment, needed frequent re-assurance that she would not fall       Patient Diagnosis(es): The encounter diagnosis was Status post total replacement of right hip.     has a past medical history of Arthritis, Edema, Hypercholesteremia, Hypertension, and Seizure ([facility] [address] has a past surgical history that includes Appendectomy; joint replacement; Salpingo-oophorectomy (2017); Colonoscopy; and Total hip arthroplasty (Right, 2019).     Restrictions  Position Activity Restriction  Other position/activity restrictions: anterolateral approach, FWBAT, No hip adduction beyond neutral, no external rotation and no hyperextension  Vision/Hearing  Vision: Within Functional Limits(reading)  Hearing: Within functional limits     Subjective  General  Chart Reviewed: Yes  Additional Pertinent Hx: arthritis, edema, hypercholesteremia, HTN, nocturnal seizures, left [facility] 2008  Referring Practitioner: Edmund Stanley  Diagnosis: right NE, anterolateral approach, regional block  Subjective  Subjective: Pt supine in bed upon PT entry, agreeable to working with PT  Pain Screening  Patient Currently in Pain: Yes(notes discomfort with movement, no pain meds requested)  Vital Signs  Patient Currently in Pain: Yes(notes discomfort with movement, no pain meds requested)       Orientation  Orientation  Overall Orientation Status: Within Functional Limits  Social/Functional History  Social/Functional History  Lives With: Alone  Type of Home: House  Home Layout: Laundry in basement  Home Access: Stairs to enter without rails  Entrance Stairs - Number of Steps: from garage has 3 steps with railing,  landing, then one step into foyer, then a step down into living room. these steps do not have a railing. Bathroom Shower/Tub: Walk-in shower  Bathroom Toilet: Standard  Bathroom Equipment: Built-in shower seat, 3-in-1 commode  Bathroom Accessibility: Accessible  Home Equipment: Rolling walker, Standard walker, Cane  ADL Assistance: Independent  Homemaking Assistance: Independent  Ambulation Assistance: Independent  Transfer Assistance: Independent  Active : Yes  Leisure & Hobbies: reads a lot, family/ friends.   Cognition        Objective  PROM RLE (degrees)  RLE General PROM: right knee wfl  AROM RLE (degrees)  RLE General AROM: hip flexion to 75, unable to extend right knee, ankle wfl  AROM LLE (degrees)  LLE AROM : WFL  Strength RLE  Comment: unable to extend right knee, ankle wfl  Strength LLE  Strength LLE: WFL     Sensation  Overall Sensation Status: (notes some numbness RLE)  Bed mobility  Supine to Sit: Minimal assistance(with cues)  Sit to Supine: Minimal assistance(with cues)  Transfers  Sit to Stand: Contact guard assistance(cues for hand and LE placement)  Stand to sit: Contact guard assistance(cues for hand and LE placement.)  Bed to Chair: Minimal assistance(due to right quad instability. Much better after KI applies)  Ambulation  Ambulation?: Yes  Ambulation 1  Surface: level tile  Device: Rolling Walker  Other Apparatus: Knee Immobilizer  Assistance: Contact guard assistance  Quality of Gait: cues for sequencing, decreased step length and height, RLE in KI  Distance: 50 ft  Stairs/Curb  Stairs?: No     Balance  Sitting - Static: Good  Sitting - Dynamic: Good  Standing - Static: Good  Standing - Dynamic: Good;-  Comments: stability with RW      Education  Patient educated regarding safety with functional mobility, transfers and gait. Pt will benefit from reinforcement  Review of anterior precautions as well, pt given written handout. Pt also educated regarding use of call light for safety with mobility. Pt expresses understanding. Exercises   AP, quad sets, glut sets x 10 with verbal cues  heel slides x 10 with min assist.      Treatment rendered and includes exercise per protocol, transfers and gait training, and education regarding safety with functional mobility.           Plan   Plan  Times per week: 7  Times per day: Twice a day  Current Treatment Recommendations: Strengthening, ROM, Gait Training, Stair training, Functional Mobility Training, Home Exercise Program  Safety Devices  Type of devices: Call light within reach, Chair alarm in place, Nurse notified, Left in chair      AM-PAC Score  AM-PAC Inpatient Mobility Raw Score : 17  AM-PAC Inpatient T-Scale Score : 42.13  Mobility Inpatient CMS 0-100% Score: 50.57  Mobility Inpatient CMS G-Code Modifier : CK          Goals  Short term goals  Time Frame for Short term goals: Discharge  Short term goal 1: Bed mobility with SBA  Short term goal 2: Transfers with SBA  Short term goal 3: Ambulate 100 ft with RW and SBA  Short term goal 4: Up and down 2-4 steps with rail and SBA  Short term goal 5: up and down curb step with walker and SBA  Patient Goals   Patient goals : \"to not fall\"       Therapy Time   Individual Concurrent Group Co-treatment   Time In 0800         Time Out 0900         Minutes 60              Timed Code Treatment Minutes:   45    Total Treatment Minutes:  [address] [facility], [id number]    This note will serve as a discharge summary in the event that the patient is discharged prior to the next treatment session.

## 2019-05-02 NOTE — PROGRESS NOTES
Physical Therapy  Facility/Department: [facility] 5T ORTHO/NEURO  Daily Treatment Note  NAME: Clarisa Mariscal  : 1934  MRN: [id number]    Date of Service: 2019    Discharge Recommendations:    Clarisa Mariscal scored a 17/24 on the AM-PAC short mobility form. Current research shows that an AM-PAC score of 17 or less is typically not associated with a discharge to the patient's home setting. Based on the patients AM-PAC score and their current functional mobility deficits, it is recommended that the patient have 3-5 sessions per week of Physical Therapy at d/c to increase the patients independence. PT Equipment Recommendations  Equipment Needed: No  Assessment: Pt doing well post right NE,  Lives alone and plans to go to SNF for short term rehab. Recommend continued therapies to maximize functional mobility and gait. Patient Diagnosis(es): The encounter diagnosis was Status post total replacement of right hip.     has a past medical history of Arthritis, Edema, Hypercholesteremia, Hypertension, and Seizure ([facility] [address] has a past surgical history that includes Appendectomy; joint replacement; Salpingo-oophorectomy (2017); Colonoscopy; and Total hip arthroplasty (Right, 2019). Restrictions  Position Activity Restriction  Other position/activity restrictions: anterolateral approach, FWBAT, No hip adduction beyond neutral, no external rotation and no hyperextension  Subjective   General  Chart Reviewed:  Yes  Additional Pertinent Hx: arthritis, edema, hypercholesteremia, HTN, nocturnal seizures, left [facility]   Referring Practitioner: York Osler  Subjective  Subjective: Pt seated in chair upon PT entry, agreeable to working with PT  Pain Screening  Patient Currently in Pain: Yes(cannot remember if she had pain medication, RN notified)  Vital Signs  Patient Currently in Pain: Yes(cannot remember if she had pain medication, RN notified)       Orientation  Orientation  Overall Orientation Status: Within Functional Limits  Cognition      Objective   Bed mobility  Supine to Sit: Minimal assistance(with cues)  Sit to Supine: Minimal assistance(with cueing)  Transfers  Sit to Stand: Minimal Assistance(cues for hand and LE placement)  Stand to sit: Contact guard assistance(cues for LE placement and hand placement)  Bed to Chair: Minimal assistance(due to right quad instability. Much better after KI applies)  Ambulation  Ambulation?: Yes  Ambulation 1  Surface: level tile  Device: Rolling Walker  Other Apparatus: Knee Immobilizer  Assistance: Contact guard assistance  Quality of Gait: good sequencing and stability  Distance: 70 ft  Stairs/Curb  Stairs?: No     Balance  Sitting - Static: Good  Sitting - Dynamic: Good  Standing - Static: Good  Standing - Dynamic: Good  Comments: good stability with RW      PROM RLE (degrees)  RLE General PROM: right knee wfl  AROM RLE (degrees)  RLE General AROM: hip flexion to 75, unable to extend right knee, ankle wfl  AROM LLE (degrees)  LLE AROM : WFL  Strength RLE  Comment: unable to extend right knee, ankle wfl  Strength LLE  Strength LLE: WFL       Education  Patient educated regarding safety with functional mobility, transfers and gait. Pt will benefit from reinforcement. Pt also educated regarding use of call light for safety with mobility. Pt expresses understanding. Assessment   Body structures, Functions, Activity limitations: Decreased functional mobility ; Decreased ROM; Decreased strength;Decreased endurance  Assessment: Pt doing well post right NE,  Lives alone and plans to go to SNF for short term rehab. Recommend continued therapies to maximize functional mobility and gait.   Treatment Diagnosis: Decreased functional mobility post right NE  Prognosis: Good  Decision Making: Low Complexity  Patient Education: role of PT, KI, safety  Barriers to Learning: anxiety  REQUIRES PT FOLLOW UP: Yes  Activity Tolerance  Activity Tolerance: Pt tolerated this PM treatment with less anxiety noted.      AM-PAC Score  AM-PAC Inpatient Mobility Raw Score : 17  AM-PAC Inpatient T-Scale Score : 42.13  Mobility Inpatient CMS 0-100% Score: 50.57  Mobility Inpatient CMS G-Code Modifier : CK          Goals  Short term goals  Time Frame for Short term goals: Discharge  Short term goal 1: Bed mobility with SBA  Short term goal 2: Transfers with SBA  Short term goal 3: Ambulate 100 ft with RW and SBA  Short term goal 4: Up and down 2-4 steps with rail and SBA  Short term goal 5: up and down curb step with walker and SBA  Patient Goals   Patient goals : \"to not fall\"    Plan    Plan  Times per week: 7  Times per day: Twice a day  Current Treatment Recommendations: Strengthening, ROM, Gait Training, Stair training, Functional Mobility Training, Home Exercise Program  Safety Devices  Type of devices: Bed alarm in place, Call light within reach, Left in bed, Nurse notified     Therapy Time   Individual Concurrent Group Co-treatment   Time In 1200         Time Out 1245         Minutes 45               Timed Code Treatment Minutes:   45    Total Treatment Minutes:  [address] [id number]

## 2019-05-02 NOTE — CONSULTS
Inpatient consult to Hospitalist  Consult performed by: Payton Ospina MD  Consult ordered by: Hayes Levine [facility] Medicine History & Physical      PCP: MOLLY Holt    Date of Admission: 5/1/2019      Chief Complaint:  Post-op medical managment    History Of Present Illness:    80 y.o. female with PMHx significant for hypertension, hyperlipidemia, seizure disorder admitted for elective total hip arthroplasty  Currently patient feels well  No chest pain shortness of breath  No abdominal pain nausea vomiting  No headaches      Past Medical History:          Diagnosis Date    Arthritis     Edema 04/22/2019    lower legs and feet    Hypercholesteremia     Hypertension     Seizure ([facility] [address]     had few episodes of probable nocturnal seizures (7/2015, 8/2018, 3/2019), negative eeg and MRI       Past Surgical History:          Procedure Laterality Date    APPENDECTOMY      COLONOSCOPY      JOINT REPLACEMENT      left    SALPINGO-OOPHORECTOMY  12/11/2017    robotic, and hysteroscopy d&c, polypectomy     TOTAL HIP ARTHROPLASTY Right 5/1/2019    RIGHT HIP TOTAL ARTHROPLASTY performed by Tate Burnett MD at [address]       Medications Prior to Admission:      Prior to Admission medications    Medication Sig Start Date End Date Taking? Authorizing Provider   docusate sodium (COLACE, DULCOLAX) 100 MG CAPS Take 100 mg by mouth 2 times daily 5/2/19  Yes Hayes Levine DO   aspirin 325 MG EC tablet Take 1 tablet by mouth 2 times daily (with meals) 5/2/19 5/1/20 Yes Hayes Levine DO   HYDROcodone-acetaminophen [facility]) 7.5-325 MG per tablet Take 1-2 tablets by mouth every 4-6 hours as needed for Pain for up to 7 days. Intended supply: 3 days.  Take lowest dose possible to manage pain 5/2/19 5/9/19 Yes Hayes Levine DO   lisinopril (PRINIVIL;ZESTRIL) 20 MG tablet Take 20 mg by mouth nightly   Yes Historical Provider, MD   levETIRAcetam (KEPPRA) 250 MG tablet Take 250 mg by mouth One tab in a.m. And 2 tabs at bedtime   Yes Historical Provider, MD   simvastatin (ZOCOR) 10 MG tablet Take 1 tablet by mouth nightly 2/7/19  Yes Nohemy Diggs., MD   Cholecalciferol (VITAMIN D3) 1000 units TABS Take by mouth nightly    Yes Historical Provider, MD   Magnesium Hydroxide (MAGNESIA PO) Take 500 mg by mouth nightly    Yes Historical Provider, MD       Allergies:  Oxycodone and Norvasc [amlodipine besylate]    Social History:      The patient currently lives at home     TOBACCO:   reports that she has never smoked. She has never used smokeless tobacco.  ETOH:   reports that she does not drink alcohol. Family History:      Reviewed in detail and negative for DM, CAD, Cancer, CVA. Positive as follows:    History reviewed. No pertinent family history. REVIEW OF SYSTEMS:   Pertinent positives as noted in the HPI. All other systems reviewed and negative. PHYSICAL EXAM:    /61   Pulse 62   Temp 98.6 °F (37 °C) (Oral)   Resp 16   Ht 5' 6\" (1.676 m)   Wt 167 lb 8.8 oz (76 kg)   SpO2 99%   BMI 27.04 kg/m²     General appearance:  No apparent distress, appears stated age and cooperative. HEENT:  Normal cephalic, atraumatic without obvious deformity. Pupils equal, round, and reactive to light. Extra ocular muscles intact. Conjunctivae/corneas clear. Neck: Supple, with full range of motion. No jugular venous distention. Trachea midline. Respiratory:  Normal respiratory effort. Clear to auscultation, bilaterally without RALES/WHEEZES/RHONCHI. Cardiovascular:  Regular rate and rhythm with normal S1/S2 without MURMUR, rubs or gallops. Abdomen: Soft, non-tender, non-distended with normal bowel sounds. Musculoskeletal:  No clubbing, cyanosis or EDEMA bilaterally. Full range of motion without deformity. Skin: Skin color, texture, turgor normal.  No rashes or lesions. Neurologic:  Neurovascularly intact without any focal sensory/motor deficits.  Cranial nerves: II-XII intact, grossly non-focal.  Psychiatric:  Alert and oriented, thought content appropriate, normal insight  Capillary Refill: Brisk,< 3 seconds   Peripheral Pulses: +2 palpable, equal bilaterally       Labs:     Recent Labs     05/02/19  0531   WBC 9.2   HGB 9.5*   HCT 28.4*        No results for input(s): NA, K, CL, CO2, BUN, CREATININE, CALCIUM, PHOS in the last 72 hours. Invalid input(s): MAGNES  No results for input(s): AST, ALT, BILIDIR, BILITOT, ALKPHOS in the last 72 hours. No results for input(s): INR in the last 72 hours. No results for input(s): Julia Jubilee in the last 72 hours. Urinalysis:      Lab Results   Component Value Date    NITRU Negative 04/22/2019    WBCUA 0-2 04/22/2019    BACTERIA 1+ 04/22/2019    RBCUA None seen 04/22/2019    BLOODU Negative 04/22/2019    SPECGRAV 1.015 04/22/2019    GLUCOSEU Negative 04/22/2019       Radiology:         XR HIP 1 VW W PELVIS RIGHT   Final Result   Impression: Interval changes of a right total hip arthroplasty. No acute abnormality. ASSESSMENT/PLAN:    Active Hospital Problems    Diagnosis Date Noted    Status post total replacement of right hip [Z96.641] 05/01/2019       Acute Medical Issues Being Addressed:    80-year-old woman consult for postoperative medical management    Hypertension  Blood pressure on low end of normal  Will discontinue lisinopril and monitor blood pressure    Hyperlipidemia  Continue statin    Seizure disorder  Continue Keppra  Will continue to follow  Thank you for the consult    DVT Prophylaxis: asd per orthopedics  Diet: DIET GENERAL;  Code Status: Full Code    PT/OT Eval Status: will have eval if appropriate given patient's condition    Dispo - once acute medical process is resolved       Tara Salcedo MD    Thank you MOLLY Delgadillo for the opportunity to be involved in this patient's care. If you have any questions or concerns please feel free to contact me.

## 2019-05-02 NOTE — CARE COORDINATION
2019  [facility] ([facility])  Clinical Case Management Department    Consult received to assist with discharge plans. Pt would like to go to [facility] upon discharge. Referral made and precert started. ALIX submitted. Pt asked me to call her daughter, Ariel, which I did and informed her of discharge plans. Pt will need medical transport to the facility upon discharge. Patient: George Godlen  MRN: [id number] / : 1934  ACCT: [de-identified]     Emergency Contacts  Healthcare Agent Appointed: Healthcare power of     Admission Documentation  Attending Provider: Dion Hoyt*  Admit date/time: 2019  8:10 AM  Status: Inpatient [101]  Diagnosis: Status post total replacement of right hip     Readmission within last 30 days:  no     Living Situation  Discharge Planning  Living Arrangements: Alone  Support Systems: Family Members  Potential Assistance Needed: Outpatient PT/OT  Type of Home Care Services: None  Patient expects to be discharged to[de-identified] home  Expected Discharge Date: 19    Service Assessment       Values / Beliefs  Do you have any ethnic, cultural, sacramental, or spiritual Taoism needs you would like us to be aware of while you are in the hospital?: No    Advance Directives (For Healthcare)  Pre-existing DNR Comfort Care/DNR Arrest/DNI Order: No  Healthcare Directive: Yes, patient has an advance directive for healthcare treatment  Type of Healthcare Directive: Durable power of  for health care  Copy in Chart: No, copy requested from family  [address] of   If you are unable to speak for yourself, does your Healthcare Agent or Legal Spokesperson know your healthcare wishes?: Yes                        [address] [facility]   deferred    Home Health/Skilled [address] [place] [address] Rd at Discharge: SNF Physical Therapy, Occupational Therapy and Nursing daily  Home care at home?  No Provider: FREIDA Provider Phone: FREIDA    Therapy Consults  PT evaluation needed?: Yes (Comment)  OT Evalulation Needed?: Yes (Comment)  SLP evaluation needed?: No    Home Medical Care  NA  Pharmacy: [facility]  Potential Assistance Purchasing Medications:  No  Does patient want to participate in local refill/meds to beds program?: No    Goals of Care  Patient expects to be discharged to[de-identified] home  Patient plans for SNF: [facility]         Mode of transport from hospital: will need medical transport    Factors facilitating achievement of predicted outcomes: Family support, Cooperative and Pleasant    Barriers to discharge: will need precert     David Menjivar RN  [facility] [employer] [facility] [employer]  Case Management Department  Ph: [phone] Fax: [phone]

## 2019-05-02 NOTE — PROGRESS NOTES
Occupational Therapy   Occupational Therapy Initial Assessment/Tx Note  Date: 2019   Patient Name: Kirkland Bamberger  MRN: [id number]     : 1934    Date of Service: 2019  Assessment: Functional independence is decreased from baseline. Pt is anxious, primarily due to instability and numbness from nerve block. She requires assist for transfers, CGA for gait with walker wearing a KI, and assist for most ADLs. Pt lives alone and plans to d/c to SNF for rehab. Recommend continued OT/PT at d/c. Discharge Recommendations: Kirkland Bamberger scored a 18/24 on the AM-PAC ADL Inpatient form. Current research shows that an AM-PAC score of 18 or greater is typically associated with a discharge to the patient's home setting. Based on the patients AM-PAC score and their current ADL deficits, it is recommended that the patient have 2-3 sessions per week of Occupational Therapy at d/c to increase the patients independence. OT Equipment Recommendations  Equipment Needed: No    Assessment   Performance deficits / Impairments: Decreased functional mobility ; Decreased ADL status; Decreased endurance;Decreased balance;Decreased high-level IADLs  Assessment: Functional independence is decreased from baseline. Pt is anxious, primarily due to instability and numbness from nerve block. She requires assist for transfers, CGA for gait with walker wearing a KI, and assist for most ADLs. Pt lives alone and plans to d/c to SNF for rehab. Recommend continued OT/PT at d/c. Treatment Diagnosis: Decreased activity tolerance, impaired ADLs and mobility  Decision Making: Medium Complexity  Patient Education: Role of OT, d/c planning, activity promotion - verb understanding  REQUIRES OT FOLLOW UP: Yes  Activity Tolerance  Activity Tolerance: Patient Tolerated treatment well  Activity Tolerance: pt is anxious but redirectable  Safety Devices  Safety Devices in place: Yes  Type of devices: Call light within reach; Left in chair;Chair alarm in place;Nurse notified(needs in reach, assist level on board)        Treatment Diagnosis: Decreased activity tolerance, impaired ADLs and mobility      Restrictions  Position Activity Restriction  Other position/activity restrictions: anterolateral approach, FWBAT, No hip adduction beyond neutral, no external rotation and no hyperextension    Subjective   General  Chart Reviewed: Yes  Additional Pertinent Hx: 80 y.o. F admitted 5/1 s/p R NE. PMHx includes arthritis, HTN, seizure, L NE  Family / Caregiver Present: No  Referring Practitioner: Jeremy Wheato  Diagnosis: s/p NE  Subjective  Subjective: Pt in chair on entry. Very pleasant and cooperative. Anxious and fearful due to instability related to nerve block but responsive to gentle encouragement and cues. Pain Assessment  Pain Assessment: 0-10(R hip pain and lower leg pain from KI, tolerable/minimal at rest, KI removed in sitting, ice pack to R hip)    Social/Functional History  Social/Functional History  Lives With: Alone  Type of Home: House  Home Layout: Laundry in basement  Home Access: Stairs to enter without rails  Entrance Stairs - Number of Steps: from garage has 3 steps with railing,  landing, then one step into foyer, then a step down into living room. these steps do not have a railing. Bathroom Shower/Tub: Walk-in shower  Bathroom Toilet: Standard  Bathroom Equipment: Built-in shower seat, 3-in-1 commode  Bathroom Accessibility: Accessible  Home Equipment: Rolling walker, Standard walker, Cane  ADL Assistance: Independent  Homemaking Assistance: Independent  Ambulation Assistance: Independent  Transfer Assistance: Independent  Active : Yes  Leisure & Hobbies: reads a lot, family/ friends.      Objective  Treatment included: functional transfer training, ADL, pt education       Orientation  Overall Orientation Status: Within Functional Limits     Balance  Sitting Balance: Independent  Standing Balance: Contact guard assistance  Standing Balance  Time: 1 min + 6 min   Activity: functional mobility in room, bathroom  Comment: Pt requires min assist sit to stand with min cues, CGA for stance and gait with walker (slow, cautious with KI in place due to instability from block), CGA stand to sit. Toilet Transfers  Toilet - Technique: Ambulating  Equipment Used: Raised toilet seat with rails  Toilet Transfer: Minimal assistance  Toilet Transfers Comments: declined commode transfer until able to see it being cleaned, simulate min assist (to/from chair)  ADL  Feeding: Independent  Grooming: Independent  UE Dressing: Modified independent (gown)  LE Dressing: Maximum assistance(to don underwear, unable to thread, assist to pull up in stance due to fear of letting go of walker)  Toileting: Unable to assess(comment)(anticipate assist needed for clothing managment due to fear of letting go of walker)  Additional Comments: Pt educated on anterior hip precautions as they relate to mobility and ADLs - pt verb understanding     Transfers  Sit to stand: Minimal assistance  Stand to sit: Contact guard assistance     Cognition  Overall Cognitive Status: WFL        Sensation  Overall Sensation Status: (notes some numbness RLE)      LUE Strength  Gross LUE Strength: WFL  RUE Strength  Gross RUE Strength: WFL     Plan  If pt discharges prior to next tx, this note will serve as d/c summary.  Continue per POC if pt does not d/c.     Plan  Times per week: 7x  Times per day: Daily      AM-PAC Score  AM-[facility] Inpatient Daily Activity Raw Score: 18  AM-PAC Inpatient ADL T-Scale Score : 38.66  ADL Inpatient CMS 0-100% Score: 46.65  ADL Inpatient CMS G-Code Modifier : CK    Goals  Short term goals  Time Frame for Short term goals: by D/C  Short term goal 1: Tylene Littler pants/brief with SBA - Not met  Short term goal 2: Transfer to/from commode and chairs SBA - Not met  Short term goal 3: Stand at sink with spvn for grooming - Not met  Patient Goals   Patient goals : to regain independence and get back to active lifestyle       Therapy Time   Individual Concurrent Group Co-treatment   Time In 1032         Time Out 1110         Minutes 38          Timed Code Tx Min: 23  Total Tx Time: 270 [facility], OT

## 2019-05-02 NOTE — PLAN OF CARE
Problem: Falls - Risk of:  Goal: Will remain free from falls  Description  Fall precautions in place. Bed is in lowest position, wheels locked, alarm on, non-skid socks on. Call light and bedside table within reach. Patient calls out appropriately. Patient is up x2 assist with gait belt and a walker to commode. Will continue to assess and monitor. Outcome: Ongoing     Problem: Pain - Acute:  Goal: Pain level will decrease  Description  Pt taking scheduled toradol at this time for pain.   Outcome: Ongoing

## 2019-05-03 PROBLEM — I10 ESSENTIAL HYPERTENSION: Chronic | Status: ACTIVE | Noted: 2019-05-03

## 2019-05-03 PROCEDURE — 97110 THERAPEUTIC EXERCISES: CPT

## 2019-05-03 PROCEDURE — 6370000000 HC RX 637 (ALT 250 FOR IP): Performed by: ORTHOPAEDIC SURGERY

## 2019-05-03 PROCEDURE — 2580000003 HC RX 258: Performed by: ORTHOPAEDIC SURGERY

## 2019-05-03 PROCEDURE — 97535 SELF CARE MNGMENT TRAINING: CPT

## 2019-05-03 PROCEDURE — 1200000000 HC SEMI PRIVATE

## 2019-05-03 PROCEDURE — 97530 THERAPEUTIC ACTIVITIES: CPT

## 2019-05-03 PROCEDURE — 6360000002 HC RX W HCPCS: Performed by: ORTHOPAEDIC SURGERY

## 2019-05-03 PROCEDURE — 97116 GAIT TRAINING THERAPY: CPT

## 2019-05-03 RX ORDER — CALCIUM CARBONATE 200(500)MG
500 TABLET,CHEWABLE ORAL 3 TIMES DAILY PRN
Status: DISCONTINUED | OUTPATIENT
Start: 2019-05-03 | End: 2019-05-06 | Stop reason: HOSPADM

## 2019-05-03 RX ORDER — POLYETHYLENE GLYCOL 3350 17 G/17G
17 POWDER, FOR SOLUTION ORAL DAILY
Status: DISCONTINUED | OUTPATIENT
Start: 2019-05-03 | End: 2019-05-05

## 2019-05-03 RX ORDER — OXYCODONE HYDROCHLORIDE AND ACETAMINOPHEN 5; 325 MG/1; MG/1
1 TABLET ORAL EVERY 4 HOURS PRN
Status: DISCONTINUED | OUTPATIENT
Start: 2019-05-03 | End: 2019-05-06 | Stop reason: HOSPADM

## 2019-05-03 RX ORDER — OXYCODONE HYDROCHLORIDE AND ACETAMINOPHEN 5; 325 MG/1; MG/1
1 TABLET ORAL EVERY 4 HOURS PRN
Status: DISCONTINUED | OUTPATIENT
Start: 2019-05-03 | End: 2019-05-03 | Stop reason: SDUPTHER

## 2019-05-03 RX ORDER — OXYCODONE HYDROCHLORIDE AND ACETAMINOPHEN 5; 325 MG/1; MG/1
1-2 TABLET ORAL
Qty: 28 TABLET | Refills: 0 | Status: SHIPPED | OUTPATIENT
Start: 2019-05-03 | End: 2019-05-10

## 2019-05-03 RX ADMIN — FAMOTIDINE 20 MG: 20 TABLET ORAL at 21:44

## 2019-05-03 RX ADMIN — ENOXAPARIN SODIUM 40 MG: 40 INJECTION SUBCUTANEOUS at 09:19

## 2019-05-03 RX ADMIN — SIMVASTATIN 10 MG: 10 TABLET, FILM COATED ORAL at 21:44

## 2019-05-03 RX ADMIN — LEVETIRACETAM 500 MG: 500 TABLET ORAL at 21:44

## 2019-05-03 RX ADMIN — Medication 10 ML: at 09:22

## 2019-05-03 RX ADMIN — DOCUSATE SODIUM 100 MG: 100 CAPSULE, LIQUID FILLED ORAL at 09:18

## 2019-05-03 RX ADMIN — OXYCODONE HYDROCHLORIDE AND ACETAMINOPHEN 1 TABLET: 5; 325 TABLET ORAL at 09:19

## 2019-05-03 RX ADMIN — ASPIRIN 81 MG 81 MG: 81 TABLET ORAL at 21:44

## 2019-05-03 RX ADMIN — DEXAMETHASONE SODIUM PHOSPHATE 3 MG: 4 INJECTION, SOLUTION INTRAMUSCULAR; INTRAVENOUS at 05:32

## 2019-05-03 RX ADMIN — DOCUSATE SODIUM 100 MG: 100 CAPSULE, LIQUID FILLED ORAL at 21:44

## 2019-05-03 RX ADMIN — FAMOTIDINE 20 MG: 20 TABLET ORAL at 09:19

## 2019-05-03 RX ADMIN — Medication 10 ML: at 21:44

## 2019-05-03 RX ADMIN — LEVETIRACETAM 250 MG: 500 TABLET ORAL at 09:19

## 2019-05-03 RX ADMIN — OXYCODONE HYDROCHLORIDE AND ACETAMINOPHEN 1 TABLET: 5; 325 TABLET ORAL at 21:44

## 2019-05-03 ASSESSMENT — PAIN SCALES - GENERAL
PAINLEVEL_OUTOF10: 6
PAINLEVEL_OUTOF10: 3
PAINLEVEL_OUTOF10: 6

## 2019-05-03 ASSESSMENT — PAIN DESCRIPTION - LOCATION
LOCATION: HIP
LOCATION: HIP

## 2019-05-03 ASSESSMENT — PAIN DESCRIPTION - ORIENTATION
ORIENTATION: RIGHT
ORIENTATION: RIGHT

## 2019-05-03 ASSESSMENT — PAIN DESCRIPTION - FREQUENCY
FREQUENCY: CONTINUOUS
FREQUENCY: CONTINUOUS

## 2019-05-03 ASSESSMENT — PAIN DESCRIPTION - DESCRIPTORS
DESCRIPTORS: ACHING
DESCRIPTORS: ACHING

## 2019-05-03 ASSESSMENT — PAIN DESCRIPTION - PAIN TYPE
TYPE: SURGICAL PAIN
TYPE: SURGICAL PAIN

## 2019-05-03 ASSESSMENT — PAIN DESCRIPTION - ONSET
ONSET: ON-GOING
ONSET: ON-GOING

## 2019-05-03 ASSESSMENT — PAIN DESCRIPTION - PROGRESSION
CLINICAL_PROGRESSION: NOT CHANGED
CLINICAL_PROGRESSION: NOT CHANGED

## 2019-05-03 ASSESSMENT — PAIN - FUNCTIONAL ASSESSMENT: PAIN_FUNCTIONAL_ASSESSMENT: ACTIVITIES ARE NOT PREVENTED

## 2019-05-03 NOTE — PLAN OF CARE
Problem: Falls - Risk of:  Goal: Will remain free from falls  Description  5/3/2019 0059 by Krishan Victoria RN  Outcome: Ongoing    Patient has remained free of falls. 2/4 bed rails up, bed locked and in lowest position, call light within reach. Patient instructed on use of call light and uses appropriately. Bed alarm on. Non-skid footwear and fall band on. Will continue to monitor. Problem: Mobility - Impaired:  Goal: Achieve maximum mobility level  Description    5/3/2019 0059 by Krishan Victoria RN  Outcome: Ongoing  Patient ambulating x1 with a walker and a gait belt. Tolerating well.

## 2019-05-03 NOTE — PROGRESS NOTES
posture    Bed mobility  Sit to Supine: Max assist for LEs    Exercises  10 reps B ankle pumps, quad sets, glut sets, R heel slides (mod assist), R SAQ in supine    Balance  Static stance with walker CGA  Ambulation with wheeled walker Min assist to CGA  Sat on 3:1 commode with Supervision  Sat EOB with SBA    Patient Education  Reviewed anterior hip precautions. Pt demonstrated understanding with mobility this session. WBAT R LE. Expressed understanding. Calling for assist with needs. Expressed understanding. Safety Devices  Pt left with needs in reach. In bed with bed alarm on. RN updated. AM-PAC score  AM-PAC Inpatient Mobility Raw Score : 16  AM-PAC Inpatient T-Scale Score : 40.78  Mobility Inpatient CMS 0-100% Score: 54.16  Mobility Inpatient CMS G-Code Modifier : CK    Goals: (as determined and assessed by primary PT)- all goals ongoing  Time Frame for Short term goals: Discharge  Short term goal 1: Bed mobility with SBA   Short term goal 2: Transfers with SBA   Short term goal 3: Ambulate 100 ft with RW and SBA  Short term goal 4: Up and down 2-4 steps with rail and SBA  Short term goal 5: up and down curb step with walker and SBA    Plan:  Times per week: 7; Times per day: Twice a day  Current Treatment Recommendations: Strengthening, ROM, Gait Training, Stair training, Functional Mobility Training, Home Exercise Program    Therapy Time    Individual  Concurrent  Group  Co-treatment    Time In  958            Time Out  1040            Minutes  42              Timed Code Treatment Minutes: 42  Total Treatment Minutes: 42    Will continue per plan of care in PM.  If patient is discharged prior to next treatment, this note will serve as the discharge summary.     [place], [place] #[id number]  Luba Warren, [address] [place] [address]

## 2019-05-03 NOTE — PROGRESS NOTES
Physical Therapy  Daily Treatment Note    Discharge Recommendations: Trey Obrien scored a 16/24 on the AM-PAC short mobility form. Current research shows that an AM-PAC score of 17 or less is typically not associated with a discharge to the patient's home setting. Based on the patients AM-PAC score and their current functional mobility deficits, it is recommended that the patient have 3-5 sessions per week of Physical Therapy at d/c to increase the patients independence. Assessment:  Pt with good effort and participation. Seems very motivated to regain strength and independence. Pt lives alone. Plan is for SNF to maximize independence prior to D/C home. Equipment Needs: Defer to next level of care    Chart Reviewed: Yes     Other position/activity restrictions: anterolateral approach, FWBAT, No hip adduction beyond neutral, no external rotation and no hyperextension   Additional Pertinent Hx: arthritis, edema, hypercholesteremia, HTN, nocturnal seizures, left THR 2008      Diagnosis: right NE, anterolateral approach, regional block   Treatment Diagnosis: Decreased functional mobility post right NE    Subjective: Pt in bed initially. States she was up to the bathroom ~ 30 minutes with assist of nursing. Ready to work with PT. \"I'm still not sure if I'm leaving. \"    Pain: Minimal at rest. 6/10 R hip with ambulation. States she had pain meds ~ 2 hours ago. Objective:    Exercises  10 reps B ankle pumps, quad sets, glut sets, R heel slides (min assist), SAQ in supine    Bed mobility  Supine to sit: Min assist x 1 with HOB up partially and use of rail  Sit to Supine: Max assist for LEs. (\"I'm scared to try. \")    Transfers  Sit to stand: Min assist x 1 from bed  Stand to sit: CGA onto bed    Ambulation  Assistance Level: CGA  Assistive device: Wheeled walker  Distance: 110 ft in [place]  Quality of gait: Mild limp R LE; Decreased step height/length;  No LOB; step-to pattern    Balance  Sat EOB with SBA  Static stance with walker CGA  Ambulation with wheeled walker CGA    Patient Education  Reviewed anterior hip precautions. Pt recalled 0/3 independently. Expressed and demonstrated understanding with mobility this session. Calling for assist with needs. Expressed understanding. Safety Devices  Pt left with needs in reach. In bed with bed alarm on. RN aware. AM-PAC score  AM-PAC Inpatient Mobility Raw Score : 16  AM-PAC Inpatient T-Scale Score : 40.78  Mobility Inpatient CMS 0-100% Score: 54.16  Mobility Inpatient CMS G-Code Modifier : CK    Goals: (as determined and assessed by primary PT)  Time Frame for Short term goals: Discharge  Short term goal 1: Bed mobility with SBA   Short term goal 2: Transfers with SBA   Short term goal 3: Ambulate 100 ft with RW and SBA  Short term goal 4: Up and down 2-4 steps with rail and SBA  Short term goal 5: up and down curb step with walker and SBA    Plan:  Times per week: 7; Times per day: Twice a day  Current Treatment Recommendations: Strengthening, ROM, Gait Training, Stair training, Functional Mobility Training, Home Exercise Program    Therapy Time    Individual  Concurrent  Group  Co-treatment    Time In  1320            Time Out  1352            Minutes  32              Timed Code Treatment Minutes: 32  Total Treatment Minutes: 32    Will continue per plan of care. If patient is discharged prior to next treatment, this note will serve as the discharge summary. Carmel Weinstein #[id number]  Add:  No goals met at this time.   Naomi Dance, [facility] [facility]

## 2019-05-03 NOTE — DISCHARGE SUMMARY
[address]  Discharge Summary    Date:  5/3/2019    Name:  Gladys Adkins  Address:  [place]  [id number]    :  1934      Age:   80 y.o.    SSN:  [id number]      Medical Record Number:  [id number]  Admit Date: 2019  Date of Discharge: 2019    PROCEDURE:   Right total hip arthroplasty - surgery date 2019     REASON FOR ADMISSION:    Postoperative pain control, vital sign monitoring, physical therapy, and IV fluid resuscitation. Current Medications:     polyethylene glycol  17 g Oral Daily    magnesium hydroxide  30 mL Oral Once    aspirin  81 mg Oral Nightly    simvastatin  10 mg Oral Nightly    sodium chloride flush  10 mL Intravenous 2 times per day    docusate sodium  100 mg Oral BID    famotidine  20 mg Oral BID    enoxaparin  40 mg Subcutaneous Daily    levETIRAcetam  250 mg Oral Daily    And    levETIRAcetam  500 mg Oral Nightly       Review of Systems:  Unchanged from preoperative H&P     Past Medical History:  Past Medical History:   Diagnosis Date    Arthritis     Edema 2019    lower legs and feet    Hypercholesteremia     Hypertension     Seizure ([address]     had few episodes of probable nocturnal seizures (2015, 2018, 3/2019), negative eeg and MRI       Past Surgical History:  Past Surgical History:   Procedure Laterality Date    APPENDECTOMY      COLONOSCOPY      JOINT REPLACEMENT      left    SALPINGO-OOPHORECTOMY  2017    robotic, and hysteroscopy d&c, polypectomy     TOTAL HIP ARTHROPLASTY Right 2019    RIGHT HIP TOTAL ARTHROPLASTY performed by Laura Fleming MD at [facility] OR       Medications:  No current facility-administered medications on file prior to encounter.       Current Outpatient Medications on File Prior to Encounter   Medication Sig Dispense Refill    lisinopril (PRINIVIL;ZESTRIL) 20 MG tablet Take 20 mg by mouth nightly      simvastatin (ZOCOR) 10 MG Extra-ocular muscles intact  Mouth: Oral mucosa moist. No perioral lesions  Pulm: Respirations unlabored and regular. Heart: Regular rate and rhythm   Skin: warm, well perfused  MS:  Extremity pain, swelling, and limited ROM. DISCHARGE DIAGNOSES:  Problem List Items Addressed This Visit     Status post total replacement of right hip - Primary    Relevant Medications    oxyCODONE-acetaminophen (PERCOCET) 5-325 MG per tablet          HOSPITAL COURSE:  Yvette Johnson is a 80 y.o. patient who was admitted to the hospital on the day of surgery. Surgery went as planned. After surgery, the patient was admitted to the Med/Surg unit for postoperative care. Postoperative course was uneventful. The patient tolerated a regular diet and had good pain control on oral pain medication. Discharged occurred on 5/6/2019 and they were discharged to a skilled nursing facility stable condition. DISCHARGE INSTRUCTIONS:  --  Weightbearing as tolerated. --  May take shower starting on 5/6/19. Do NOT soak dressing/incision. May rinse over the shower and pat dressing dry. The incision may be removed 10-14 days from surgery or at her first physical therapy appointment. Pat the incision dry with clean towel. Keep incision clean and dry. --  Resume pre-op diet. -- Continue PT/OT on a daily basis for gait training and strengthening  --  Total joint precautions (lateral approach total hip precautions - no active abduction)  --  Take the medicines prescribed for pain. --  Aspirin 325 mg BID x 4 weeks for DVT prophylaxis once she is discharge from the hospital. Take with breakfast and dinner. Drink plenty of water with this medication. --  Resume home medications per PCP. --  Follow up with orthopaedic surgeon as scheduled   --  Follow up with primary care physician within 6-8 weeks. --  For any questions or concerns, call [facility] office or go to the nearest emergency department. Kaye Rose.  Sharlene Lopez, [address] [facility]  Date:  5/3/2019      No discharge order was placed on the date of discharge because it was previously placed but cancelled

## 2019-05-03 NOTE — PROGRESS NOTES
Occupational Therapy  Daily Treatment Note  Patient Name: Denae Obregon  MRN: [id number]    Assessment: Pt is gradually improving functionally. She continues to be anxious about mobility due to fear of falling and pain, but is cooperative and receptive to therapy. She requires assist for lower body ADLs. Pt lives alone without 24 hr A and plans to d/c to SNF for rehab. Discharge Recommendations: Denae Obregon scored a 20/24 on the AM-PAC ADL Inpatient form. Current research shows that an AM-PAC score of 18 or greater is typically associated with a discharge to the patient's home setting. Based on the patients AM-PAC score and their current ADL deficits, it is recommended that the patient have 2-3 sessions per week of Occupational Therapy at d/c to increase the patients independence. Equipment Needs:  No    Chart Reviewed: Yes     Other position/activity restrictions: anterolateral approach, FWBAT, No hip adduction beyond neutral, no external rotation and no hyperextension   Additional Pertinent Hx: arthritis, edema, hypercholesteremia, HTN, nocturnal seizures, left THR 2008    Diagnosis: s/p NE  Treatment Diagnosis: Decreased activity tolerance, impaired ADLs and mobility    Subjective: Pt in bed on entry. Pleasant and cooperative. \"I'm just so scared of falling. \" Pt remains anxious. Pain: Yes, incisional burning, ice pack applied, RN aware (pt reports feeling like nerve block is still in effect somewhat; \"It just feels different than the L leg. \" RN aware)    Objective:    Cognition/Orientation: WFL    Bed mobility   Supine to sit: Min assist (for RLE; HOB raised)  Scooting: Independent    Functional Mobility   Sit to Stand: CGA, min cues  Stand to Sit: CGA, min cues (increased time and effort due to pain, fear)  Chair Transfer: CGA, min cues, walker  Other: Functional mobility in room, bathroom for grooming, bathing, dressing with rolling walker.  CGA for gait and dynamic stance; SBA for static

## 2019-05-03 NOTE — CARE COORDINATION
[facility] have not gotten precert today but hope to have it on Monday. Will notify patient and family.      Electronically signed by Josselyn Obrien RN on 5/3/2019 at 5:19 PM  [phone]

## 2019-05-03 NOTE — PROGRESS NOTES
Up to BR with walker with assist of 1, Voiding without difficulty. Will cont to monitor for safety and comfort.

## 2019-05-03 NOTE — PROGRESS NOTES
Orthopaedic Surgery Fellow Post Operative NE Rounding Progress Note    History of Present Illness:  Robert Johnson is a 80 y.o. female who was seen this morning. There were no events overnight. Pain is controlled. She would like to try the stronger pain medication is possible. She was having some heart burn. No bowel movement yet. She has been up and ambulating with physical therapy. She says her hip feels \"numb\" still. They currently deny any dizziness, fevers, chills, shortness of breath, chest pain, LE paresthesias, or any other current complaints. Medical History:  Patient's medications, allergies, past medical, surgical, social and family histories were reviewed and updated as appropriate. They are as noted. Social History     Socioeconomic History    Marital status:       Spouse name: Not on file    Number of children: Not on file    Years of education: Not on file    Highest education level: Not on file   Occupational History    Not on file   Social Needs    Financial resource strain: Not on file    Food insecurity:     Worry: Not on file     Inability: Not on file    Transportation needs:     Medical: Not on file     Non-medical: Not on file   Tobacco Use    Smoking status: Never Smoker    Smokeless tobacco: Never Used   Substance and Sexual Activity    Alcohol use: No    Drug use: No    Sexual activity: Not on file   Lifestyle    Physical activity:     Days per week: Not on file     Minutes per session: Not on file    Stress: Not on file   Relationships    Social connections:     Talks on phone: Not on file     Gets together: Not on file     Attends Mormon service: Not on file     Active member of club or organization: Not on file     Attends meetings of clubs or organizations: Not on file     Relationship status: Not on file    Intimate partner violence:     Fear of current or ex partner: Not on file     Emotionally abused: Not on file     Physically abused: Not wheezing  Abdomen: Negative for abdominal pain or bloating   Neurological: Negative for numbness, paresthesias, or weakness  Psychiatric: Displaying appropriate mentation, judgement, and decision making  Urological: Negative for urinary retention  Skin: warm, well perfused, dressing in place over hip    Vital Signs:  Vitals:    05/03/19 0230   BP: 112/61   Pulse: 80   Resp: 16   Temp: 99 °F (37.2 °C)   SpO2: 95%     Height: 5' 6\" (167.6 cm), Weight: 167 lb 8.8 oz (76 kg), Body mass index is 27.04 kg/m². ,      Physical Exam:     Appearance: Javi Lala is alert, oriented x 3, resting comforably, no acute distress. Right Hip Exam:  Dressing is clean, dry, and intact. Compartments are soft and compressible. Flexion and extension is intact although limited due to post operative status. Motor intact with knee flexion, knee extension, dorsiflexion, EHL, and plantar flexion. Sensation intact on the dorsal and plantar aspect of the foot. Negative Chris's sign. +pedal edema which is chronic in nature. Compression stockings in place. +DP/PT pulse, foot warm with brisk capillary refill < 2 seconds. Assessment:  1). S/p right Total hip arthroplasty, surgery date 5/1/19    Treatment Plan:    1). Maintain dressing  2). Pain control PRN  3). DVT ppx: Lovenox/ASA while in the hospital   4). Ice PRN  5). Encourage diet and fluids  6). GI ppx: pepcid BID and tums PRN  7). PT/OT, increase activity as tolerated per protocol, WBAT-hip precautions based on approach   8). Nutritional support - added ensure  9). IM recommendations  10). Case management help with discharge planning to SNF when bed available: the patient is stable for discharge as soon as a bed is available    Disposition: Anticipate discharge to [facility]SNF Friday vs Saturday pending patient progression and bed availability at [facility]   -Call with any questions or concerns: [phone]      Victor Manuel Freire, [address] [place] [address] [facility]

## 2019-05-04 PROCEDURE — 1200000000 HC SEMI PRIVATE

## 2019-05-04 PROCEDURE — 97116 GAIT TRAINING THERAPY: CPT

## 2019-05-04 PROCEDURE — 97530 THERAPEUTIC ACTIVITIES: CPT

## 2019-05-04 PROCEDURE — 2580000003 HC RX 258: Performed by: ORTHOPAEDIC SURGERY

## 2019-05-04 PROCEDURE — 97110 THERAPEUTIC EXERCISES: CPT

## 2019-05-04 PROCEDURE — 6360000002 HC RX W HCPCS: Performed by: ORTHOPAEDIC SURGERY

## 2019-05-04 PROCEDURE — 6370000000 HC RX 637 (ALT 250 FOR IP): Performed by: ORTHOPAEDIC SURGERY

## 2019-05-04 RX ADMIN — LEVETIRACETAM 500 MG: 500 TABLET ORAL at 20:51

## 2019-05-04 RX ADMIN — Medication 10 ML: at 09:32

## 2019-05-04 RX ADMIN — ENOXAPARIN SODIUM 40 MG: 40 INJECTION SUBCUTANEOUS at 09:28

## 2019-05-04 RX ADMIN — FAMOTIDINE 20 MG: 20 TABLET ORAL at 20:51

## 2019-05-04 RX ADMIN — FAMOTIDINE 20 MG: 20 TABLET ORAL at 09:30

## 2019-05-04 RX ADMIN — LEVETIRACETAM 250 MG: 500 TABLET ORAL at 09:31

## 2019-05-04 RX ADMIN — OXYCODONE HYDROCHLORIDE AND ACETAMINOPHEN 1 TABLET: 5; 325 TABLET ORAL at 03:38

## 2019-05-04 RX ADMIN — OXYCODONE HYDROCHLORIDE AND ACETAMINOPHEN 1 TABLET: 5; 325 TABLET ORAL at 09:59

## 2019-05-04 RX ADMIN — POLYETHYLENE GLYCOL (3350) 17 G: 17 POWDER, FOR SOLUTION ORAL at 09:30

## 2019-05-04 RX ADMIN — MAGNESIUM HYDROXIDE 30 ML: 400 SUSPENSION ORAL at 09:29

## 2019-05-04 RX ADMIN — DOCUSATE SODIUM 100 MG: 100 CAPSULE, LIQUID FILLED ORAL at 09:29

## 2019-05-04 RX ADMIN — SIMVASTATIN 10 MG: 10 TABLET, FILM COATED ORAL at 20:51

## 2019-05-04 RX ADMIN — ASPIRIN 81 MG 81 MG: 81 TABLET ORAL at 20:51

## 2019-05-04 RX ADMIN — OXYCODONE HYDROCHLORIDE AND ACETAMINOPHEN 1 TABLET: 5; 325 TABLET ORAL at 20:51

## 2019-05-04 RX ADMIN — Medication 10 ML: at 20:52

## 2019-05-04 ASSESSMENT — PAIN DESCRIPTION - PAIN TYPE
TYPE: SURGICAL PAIN
TYPE: ACUTE PAIN;SURGICAL PAIN
TYPE: SURGICAL PAIN

## 2019-05-04 ASSESSMENT — PAIN SCALES - GENERAL
PAINLEVEL_OUTOF10: 3
PAINLEVEL_OUTOF10: 3
PAINLEVEL_OUTOF10: 5
PAINLEVEL_OUTOF10: 5
PAINLEVEL_OUTOF10: 3
PAINLEVEL_OUTOF10: 2
PAINLEVEL_OUTOF10: 4
PAINLEVEL_OUTOF10: 6
PAINLEVEL_OUTOF10: 5

## 2019-05-04 ASSESSMENT — PAIN DESCRIPTION - DESCRIPTORS
DESCRIPTORS: CONSTANT
DESCRIPTORS: ACHING;CONSTANT
DESCRIPTORS: CONSTANT
DESCRIPTORS: ACHING

## 2019-05-04 ASSESSMENT — PAIN DESCRIPTION - ORIENTATION
ORIENTATION: RIGHT

## 2019-05-04 ASSESSMENT — PAIN DESCRIPTION - PROGRESSION
CLINICAL_PROGRESSION: NOT CHANGED

## 2019-05-04 ASSESSMENT — PAIN DESCRIPTION - FREQUENCY
FREQUENCY: CONTINUOUS

## 2019-05-04 ASSESSMENT — PAIN DESCRIPTION - LOCATION
LOCATION: HIP

## 2019-05-04 ASSESSMENT — PAIN DESCRIPTION - ONSET
ONSET: ON-GOING

## 2019-05-04 ASSESSMENT — PAIN - FUNCTIONAL ASSESSMENT: PAIN_FUNCTIONAL_ASSESSMENT: ACTIVITIES ARE NOT PREVENTED

## 2019-05-04 NOTE — PLAN OF CARE
Problem: Falls - Risk of:  Goal: Will remain free from falls  Description  Fall precautions in place. Bed is in lowest position, wheels locked, alarm on, non-skid socks on. Call light and bedside table within reach. Patient calls out appropriately. Patient is up x1 assist with a walker. Will continue to assess and monitor. 5/4/2019 0239 by Ebony Esteban, RN  Outcome: Ongoing       Problem: Pain - Acute:  Goal: Pain level will decrease  Description  Pt c/o surgical pain, requests oral pain medication PRN with relief.   Outcome: Ongoing

## 2019-05-04 NOTE — PROGRESS NOTES
Physical Therapy  Facility/Department: [facility] 5T ORTHO/NEURO  Daily Treatment Note  NAME: Edgar Hart  : 1934  MRN: [id number]    Date of Service: 2019    Discharge Recommendations:  Edgar Hart scored a 16/24 on the AM-PAC short mobility form. Current research shows that an AM-PAC score of 17 or less is typically not associated with a discharge to the patient's home setting. Based on the patients AM-PAC score and their current functional mobility deficits, it is recommended that the patient have 3-5 sessions per week of Physical Therapy at d/c to increase the patients independence. Patient would benefit from continued therapy after discharge   PT Equipment Recommendations  Equipment Needed: No    Patient Diagnosis(es): The encounter diagnosis was Status post total replacement of right hip.     has a past medical history of Arthritis, Edema, Hypercholesteremia, Hypertension, and Seizure ([facility] [address] has a past surgical history that includes Appendectomy; joint replacement; Salpingo-oophorectomy (2017); Colonoscopy; and Total hip arthroplasty (Right, 2019). Restrictions  Position Activity Restriction  Other position/activity restrictions: anterolateral approach, FWBAT, No hip adduction beyond neutral, no external rotation and no hyperextension  Subjective   General  Chart Reviewed: Yes  Additional Pertinent Hx: arthritis, edema, hypercholesteremia, HTN, nocturnal seizures, left THR 2008  Family / Caregiver Present: No  Referring Practitioner: Vielka Escobar  Subjective  Subjective: Pt supine in bed & agreeable to PT. Pain Screening  Patient Currently in Pain: Yes(right hip with movement.)  Vital Signs  Patient Currently in Pain: Yes(right hip with movement.)             Objective   Bed mobility  Supine to Sit: Minimal assistance(& verbal cues. HOB elevated.)  Transfers  Sit to Stand: Minimal Assistance(verbal cues. slow & effortful.)  Stand to sit: Contact guard assistance(verbal cues. very slow & effortful. pt fearful.)  Ambulation  Ambulation?: Yes  Ambulation 1  Surface: level tile  Device: Rolling Walker  Assistance: Contact guard assistance  Quality of Gait: slow, guarded step-to pattern. decreased WB & stance time right LE. good sequencing and stability  Distance: 150 ft  Comments: no LOB. Stairs/Curb  Stairs?: No        Exercises  Quad Sets: x 10 bilat  Heelslides: x 10 right (min A)  Gluteal Sets: x 10 bilat  Knee Long Arc Quad: x 10 right  Knee Short Arc Quad: x 10 right  Ankle Pumps: x 20 bilat                        Assessment   Body structures, Functions, Activity limitations: Decreased functional mobility ; Decreased ROM; Decreased strength;Decreased endurance  Assessment: Pt functioning below baseline s/p THR. Pt lives alone. Recommend further inpt PT prior to D/C home. Cont PT while here to maximize mobility. Treatment Diagnosis: Decreased functional mobility post right NE  Prognosis: Good  Patient Education: bed mobility, transfers, gait, LE ther ex  REQUIRES PT FOLLOW UP: Yes  Activity Tolerance  Activity Tolerance: Patient Tolerated treatment well;Patient limited by pain  Activity Tolerance: pt fearful & anxious with mobility.                                                     AM-PAC Score  AM-PAC Inpatient Mobility Raw Score : 16  AM-PAC Inpatient T-Scale Score : 40.78  Mobility Inpatient CMS 0-100% Score: 54.16  Mobility Inpatient CMS G-Code Modifier : CK          Goals  Short term goals  Time Frame for Short term goals: Discharge  Short term goal 1: Bed mobility with SBA  Short term goal 2: Transfers with SBA  Short term goal 3: Ambulate 100 ft with RW and SBA  Short term goal 4: Up and down 2-4 steps with rail and SBA  Short term goal 5: up and down curb step with walker and SBA  Patient Goals   Patient goals : \"to not fall\"    Plan    Plan  Times per week: 7  Times per day: Twice a day  Current Treatment Recommendations: Strengthening, ROM, Gait Training, Stair training,

## 2019-05-04 NOTE — PLAN OF CARE
Problem: Pain:  Goal: Control of acute pain  Description  Patient's right hip surgical pain will remain at a manageable level throughout the duration of the shift. Patient understands prescribed pain medication regimen for its uses and side effects. Patient understands how to rate pain appropriately and when to call out if experiencing breakthrough pain. Patient understands how to utilize non pharmacological pain management techniques. Pain last rated at 4/10. Patient medicated per [facility] with PO pain medication and IV toradol. Pain well controlled at this time. Will continue to monitor and reassess. Note:   Pain is controlled with 1 percocet this am. Will cont to monitor for comfort.

## 2019-05-04 NOTE — PROGRESS NOTES
Orthopaedic Surgery Fellow Post Operative NE Rounding Progress Note    History of Present Illness:  Yvette Johnson is a 80 y.o. female who was seen this morning. There were no events overnight. Pain is controlled on percocet. She has been up and ambulating with physical therapy. She denies any dizziness, fevers, chills, shortness of breath, chest pain, LE paresthesias, or any other current complaints. Medical History:  Patient's medications, allergies, past medical, surgical, social and family histories were reviewed and updated as appropriate. They are as noted. Social History     Socioeconomic History    Marital status:       Spouse name: Not on file    Number of children: Not on file    Years of education: Not on file    Highest education level: Not on file   Occupational History    Not on file   Social Needs    Financial resource strain: Not on file    Food insecurity:     Worry: Not on file     Inability: Not on file    Transportation needs:     Medical: Not on file     Non-medical: Not on file   Tobacco Use    Smoking status: Never Smoker    Smokeless tobacco: Never Used   Substance and Sexual Activity    Alcohol use: No    Drug use: No    Sexual activity: Not on file   Lifestyle    Physical activity:     Days per week: Not on file     Minutes per session: Not on file    Stress: Not on file   Relationships    Social connections:     Talks on phone: Not on file     Gets together: Not on file     Attends Jewish service: Not on file     Active member of club or organization: Not on file     Attends meetings of clubs or organizations: Not on file     Relationship status: Not on file    Intimate partner violence:     Fear of current or ex partner: Not on file     Emotionally abused: Not on file     Physically abused: Not on file     Forced sexual activity: Not on file   Other Topics Concern    Not on file   Social History Narrative    Not on file       Allergies   Allergen Reactions    Oxycodone Other (See Comments)     Depression    Norvasc [Amlodipine Besylate] Anxiety     No current facility-administered medications on file prior to encounter. Current Outpatient Medications on File Prior to Encounter   Medication Sig Dispense Refill    lisinopril (PRINIVIL;ZESTRIL) 20 MG tablet Take 20 mg by mouth nightly      simvastatin (ZOCOR) 10 MG tablet Take 1 tablet by mouth nightly 30 tablet 5    Cholecalciferol (VITAMIN D3) 1000 units TABS Take by mouth nightly       Magnesium Hydroxide (MAGNESIA PO) Take 500 mg by mouth nightly        family history is not on file.   Past Medical History:   Diagnosis Date    Arthritis     Edema 04/22/2019    lower legs and feet    Hypercholesteremia     Hypertension     Seizure ([facility] [address]     had few episodes of probable nocturnal seizures (7/2015, 8/2018, 3/2019), negative eeg and MRI     Past Medical History:   Diagnosis Date    Arthritis     Edema 04/22/2019    lower legs and feet    Hypercholesteremia     Hypertension     Seizure ([address]     had few episodes of probable nocturnal seizures (7/2015, 8/2018, 3/2019), negative eeg and MRI     Active Ambulatory Problems     Diagnosis Date Noted    No Active Ambulatory Problems     Resolved Ambulatory Problems     Diagnosis Date Noted    PMB (postmenopausal bleeding) 12/11/2017    Uterine polyp 12/11/2017    Ovarian cyst 12/11/2017    Tongue pain 07/03/2018    Acute cystitis without hematuria 07/03/2018     Past Medical History:   Diagnosis Date    Arthritis     Edema 04/22/2019    Hypercholesteremia     Hypertension     Seizure ([facility] [address]        Review of Systems:  Gen: No dizziness, fevers, chills  HEENT: Negative for double vision, visual changes  CV: Negative for chest pain, palpitations  Lungs: Negative for shortness of breath or wheezing  Abdomen: Negative for abdominal pain or bloating   Neurological: Negative for numbness, paresthesias, or weakness  Psychiatric: Displaying order will be given  -Call with any questions or concerns: [phone]      Mansi .  Corrina Porter, [address] [facility]

## 2019-05-04 NOTE — PROGRESS NOTES
Physical Therapy  Facility/Department: [facility] 5T ORTHO/NEURO  Daily Treatment Note  NAME: Clarisa Mariscal  : 1934  MRN: [id number]    Date of Service: 2019    Discharge Recommendations:  Clarisa Mariscal scored a 16/24 on the AM-PAC short mobility form. Current research shows that an AM-PAC score of 17 or less is typically not associated with a discharge to the patient's home setting. Based on the patients AM-PAC score and their current functional mobility deficits, it is recommended that the patient have 3-5 sessions per week of Physical Therapy at d/c to increase the patients independence. Patient would benefit from continued therapy after discharge   PT Equipment Recommendations  Equipment Needed: No    Patient Diagnosis(es): The encounter diagnosis was Status post total replacement of right hip.     has a past medical history of Arthritis, Edema, Hypercholesteremia, Hypertension, and Seizure ([facility] [address] has a past surgical history that includes Appendectomy; joint replacement; Salpingo-oophorectomy (2017); Colonoscopy; and Total hip arthroplasty (Right, 2019). Restrictions  Position Activity Restriction  Other position/activity restrictions: anterolateral approach, FWBAT, No hip adduction beyond neutral, no external rotation and no hyperextension  Subjective   General  Chart Reviewed: Yes  Additional Pertinent Hx: arthritis, edema, hypercholesteremia, HTN, nocturnal seizures, left THR 2008  Family / Caregiver Present: No  Referring Practitioner: York Osler  Subjective  Subjective: Pt supine in bed & agreeable to PT.   Pain Screening  Patient Currently in Pain: Yes(4/10 pain right hip)  Vital Signs  Patient Currently in Pain: Yes(4/10 pain right hip)               Objective   Bed mobility  Supine to Sit: Minimal assistance(HOB elevated, verbal cues.)  Transfers  Sit to Stand: Minimal Assistance(verbal cues, slow & effortful)  Stand to sit: Contact guard assistance(verbal cues, slow & effortful)  Comment: pt fearful with transfers  Ambulation  Ambulation?: Yes  Ambulation 1  Surface: level tile  Device: Rolling Walker  Assistance: Contact guard assistance  Quality of Gait: slow, guarded step-to pattern. decreased WB & stance time right LE. good sequencing and stability  Distance: 175 ft  Comments: no LOB. Stairs/Curb  Stairs?: No        Exercises  Quad Sets: x 10 bilat  Heelslides: x 10 right seated  Gluteal Sets: x 10 bilat  Knee Long Arc Quad: x 10 right  Knee Short Arc Quad: x 10 right  Ankle Pumps: x 20 bilat                        Assessment   Body structures, Functions, Activity limitations: Decreased functional mobility ; Decreased ROM; Decreased strength;Decreased endurance  Assessment: Pt functioning below baseline s/p THR. Pt lives alone. Recommend further inpt PT prior to D/C home. Cont PT while here to maximize mobility. Treatment Diagnosis: Decreased functional mobility post right NE  Prognosis: Good  Patient Education: bed mobility, transfers, gait, LE ther ex  REQUIRES PT FOLLOW UP: Yes  Activity Tolerance  Activity Tolerance: Patient Tolerated treatment well;Patient limited by pain  Activity Tolerance: pt fearful & anxious with mobility.                                                           AM-PAC Score  AM-PAC Inpatient Mobility Raw Score : 16  AM-PAC Inpatient T-Scale Score : 40.78  Mobility Inpatient CMS 0-100% Score: 54.16  Mobility Inpatient CMS G-Code Modifier : CK          Goals  Short term goals  Time Frame for Short term goals: Discharge  Short term goal 1: Bed mobility with SBA  Short term goal 2: Transfers with SBA  Short term goal 3: Ambulate 100 ft with RW and SBA  Short term goal 4: Up and down 2-4 steps with rail and SBA  Short term goal 5: up and down curb step with walker and SBA  Patient Goals   Patient goals : \"to not fall\"    Plan    Plan  Times per week: 7  Times per day: Twice a day  Current Treatment Recommendations: Strengthening, ROM, Gait Training,

## 2019-05-05 PROCEDURE — 1200000000 HC SEMI PRIVATE

## 2019-05-05 PROCEDURE — 97116 GAIT TRAINING THERAPY: CPT

## 2019-05-05 PROCEDURE — 97110 THERAPEUTIC EXERCISES: CPT

## 2019-05-05 PROCEDURE — 6370000000 HC RX 637 (ALT 250 FOR IP): Performed by: ORTHOPAEDIC SURGERY

## 2019-05-05 PROCEDURE — 6360000002 HC RX W HCPCS: Performed by: ORTHOPAEDIC SURGERY

## 2019-05-05 PROCEDURE — 97530 THERAPEUTIC ACTIVITIES: CPT

## 2019-05-05 PROCEDURE — 97535 SELF CARE MNGMENT TRAINING: CPT

## 2019-05-05 PROCEDURE — 2580000003 HC RX 258: Performed by: ORTHOPAEDIC SURGERY

## 2019-05-05 RX ADMIN — OXYCODONE HYDROCHLORIDE AND ACETAMINOPHEN 1 TABLET: 5; 325 TABLET ORAL at 21:26

## 2019-05-05 RX ADMIN — FAMOTIDINE 20 MG: 20 TABLET ORAL at 21:20

## 2019-05-05 RX ADMIN — LEVETIRACETAM 500 MG: 500 TABLET ORAL at 21:21

## 2019-05-05 RX ADMIN — FAMOTIDINE 20 MG: 20 TABLET ORAL at 09:38

## 2019-05-05 RX ADMIN — OXYCODONE HYDROCHLORIDE AND ACETAMINOPHEN 1 TABLET: 5; 325 TABLET ORAL at 01:11

## 2019-05-05 RX ADMIN — Medication 10 ML: at 10:49

## 2019-05-05 RX ADMIN — ENOXAPARIN SODIUM 40 MG: 40 INJECTION SUBCUTANEOUS at 09:38

## 2019-05-05 RX ADMIN — ASPIRIN 81 MG 81 MG: 81 TABLET ORAL at 21:20

## 2019-05-05 RX ADMIN — SIMVASTATIN 10 MG: 10 TABLET, FILM COATED ORAL at 21:20

## 2019-05-05 RX ADMIN — LEVETIRACETAM 250 MG: 500 TABLET ORAL at 09:39

## 2019-05-05 RX ADMIN — OXYCODONE HYDROCHLORIDE AND ACETAMINOPHEN 1 TABLET: 5; 325 TABLET ORAL at 10:48

## 2019-05-05 ASSESSMENT — PAIN SCALES - GENERAL
PAINLEVEL_OUTOF10: 6
PAINLEVEL_OUTOF10: 6
PAINLEVEL_OUTOF10: 3
PAINLEVEL_OUTOF10: 2
PAINLEVEL_OUTOF10: 6

## 2019-05-05 ASSESSMENT — PAIN DESCRIPTION - ONSET
ONSET: ON-GOING

## 2019-05-05 ASSESSMENT — PAIN DESCRIPTION - ORIENTATION
ORIENTATION: RIGHT

## 2019-05-05 ASSESSMENT — PAIN DESCRIPTION - DESCRIPTORS
DESCRIPTORS: CONSTANT;ACHING
DESCRIPTORS: ACHING;CONSTANT
DESCRIPTORS: ACHING

## 2019-05-05 ASSESSMENT — PAIN DESCRIPTION - PAIN TYPE
TYPE: SURGICAL PAIN
TYPE: ACUTE PAIN;SURGICAL PAIN
TYPE: SURGICAL PAIN

## 2019-05-05 ASSESSMENT — PAIN DESCRIPTION - LOCATION
LOCATION: HIP

## 2019-05-05 ASSESSMENT — PAIN - FUNCTIONAL ASSESSMENT
PAIN_FUNCTIONAL_ASSESSMENT: PREVENTS OR INTERFERES SOME ACTIVE ACTIVITIES AND ADLS
PAIN_FUNCTIONAL_ASSESSMENT: ACTIVITIES ARE NOT PREVENTED

## 2019-05-05 ASSESSMENT — PAIN DESCRIPTION - FREQUENCY
FREQUENCY: CONTINUOUS

## 2019-05-05 ASSESSMENT — PAIN DESCRIPTION - PROGRESSION
CLINICAL_PROGRESSION: NOT CHANGED

## 2019-05-05 NOTE — PLAN OF CARE
Problem: Falls - Risk of:  Goal: Absence of physical injury  Description  Absence of physical injury  Outcome: Ongoing  Note:   Pt remains free from injury. Bed and chair alarm in use, will cont to monitor.

## 2019-05-05 NOTE — PLAN OF CARE
Problem: Falls - Risk of:  Goal: Will remain free from falls  Description  Patient will remain free of falls throughout the duration of the shift. Bed locked in lowest position. Non skid socks on. Bed and chair alarm activated. Call light and belongings within reach. Patient calls out approprietly. Room door open. x1 assist with walker. Will continue to monitor and reassess. Outcome: Ongoing   Calls out appropriately. Bed locked in lowest position. Bed alarm on. Call light/belongings within reach. Will continue to monitor. Problem: Mobility - Impaired:  Goal: Achieve maximum mobility level  Description  Achieve maximum mobility level. Patient's mobility is impaired due to right total hip surgery. Patient worked with PT/OT today and is tolerating ambulation well with a walker and x1 assist. Patient ambulated in the room and the halls today. RN encouraging out of bed ambulation. Patient up in chair for some time this afternoon, currently back in bed. Will continue to monitor and reassess. Outcome: Ongoing   Tolerating ambulation well with walker. Will continue to monitor. Problem: Pain - Acute:  Goal: Pain level will decrease  Description  Patient's right hip surgical pain will remain at a manageable level throughout the duration of the shift. Patient understands prescribed pain medication regimen for its uses and side effects. Patient understands how to rate pain appropriately and when to call out if experiencing breakthrough pain. Patient understands how to utilize non pharmacological pain management techniques. Pain last rated at 4/10. Patient medicated per [facility] with PO pain medication and IV toradol. Pain well controlled at this time. Will continue to monitor and reassess. Outcome: Ongoing   Pain treated with oral medication. Will continue to monitor. Problem: Gas Exchange - Impaired:  Goal: Levels of oxygenation will improve  Description   Levels of oxygenation will improve.  Patient educated on risks of post-operative atelectasis. RN introduced to incentive spirometer and patient demonstrated understanding. RN encouraged its use. Lungs clear upon auscultation, patient denies shortness of breath. O2 saturation stable on room air. Will continue to monitor and reassess. Outcome: Ongoing   SpO2 >90% on room air. IS encouraged. Will continue to monitor.

## 2019-05-05 NOTE — PROGRESS NOTES
Allergies   Allergen Reactions    Oxycodone Other (See Comments)     Depression    Norvasc [Amlodipine Besylate] Anxiety     No current facility-administered medications on file prior to encounter. Current Outpatient Medications on File Prior to Encounter   Medication Sig Dispense Refill    lisinopril (PRINIVIL;ZESTRIL) 20 MG tablet Take 20 mg by mouth nightly      simvastatin (ZOCOR) 10 MG tablet Take 1 tablet by mouth nightly 30 tablet 5    Cholecalciferol (VITAMIN D3) 1000 units TABS Take by mouth nightly       Magnesium Hydroxide (MAGNESIA PO) Take 500 mg by mouth nightly        family history is not on file.   Past Medical History:   Diagnosis Date    Arthritis     Edema 04/22/2019    lower legs and feet    Hypercholesteremia     Hypertension     Seizure ([facility] [address]     had few episodes of probable nocturnal seizures (7/2015, 8/2018, 3/2019), negative eeg and MRI     Past Medical History:   Diagnosis Date    Arthritis     Edema 04/22/2019    lower legs and feet    Hypercholesteremia     Hypertension     Seizure ([address]     had few episodes of probable nocturnal seizures (7/2015, 8/2018, 3/2019), negative eeg and MRI     Active Ambulatory Problems     Diagnosis Date Noted    No Active Ambulatory Problems     Resolved Ambulatory Problems     Diagnosis Date Noted    PMB (postmenopausal bleeding) 12/11/2017    Uterine polyp 12/11/2017    Ovarian cyst 12/11/2017    Tongue pain 07/03/2018    Acute cystitis without hematuria 07/03/2018     Past Medical History:   Diagnosis Date    Arthritis     Edema 04/22/2019    Hypercholesteremia     Hypertension     Seizure ([facility] [address]        Review of Systems:  Gen: No dizziness, fevers, chills  HEENT: Negative for double vision, visual changes  CV: Negative for chest pain, palpitations  Lungs: Negative for shortness of breath or wheezing  Abdomen: Negative for abdominal pain or bloating   Neurological: Negative for numbness, paresthesias, or weakness  Psychiatric: Displaying appropriate mentation, judgement, and decision making  Urological: Negative for urinary retention  Skin: warm, well perfused, dressing in place over hip    Vital Signs:  Vitals:    05/05/19 0739   BP: 124/62   Pulse: 76   Resp: 16   Temp: 98.4 °F (36.9 °C)   SpO2: 96%     Height: 5' 6\" (167.6 cm), Weight: 167 lb 8.8 oz (76 kg), Body mass index is 27.04 kg/m². ,      Physical Exam:     Appearance: Opal Batres is alert, oriented x 3, resting comforably, no acute distress. Right Hip Exam:  Dressing is clean, dry, and intact. Compartments are soft and compressible. Flexion and extension is intact although limited due to post operative status. Motor intact with knee flexion, knee extension, dorsiflexion, EHL, and plantar flexion. Sensation intact on the dorsal and plantar aspect of the foot. Negative Chris's sign. +pedal edema which is chronic in nature. Compression stockings in place. +DP/PT pulse, foot warm with brisk capillary refill < 2 seconds. Assessment:  1). S/p right Total hip arthroplasty, surgery date 5/1/19    Treatment Plan:    1). Maintain dressing  2). Pain control PRN  3). DVT ppx: Lovenox/ASA while in the hospital   4). Ice PRN  5). Encourage diet and fluids  6). GI ppx: pepcid BID and tums PRN  7). PT/OT, increase activity as tolerated per protocol, WBAT-hip precautions based on approach   8). Nutritional support - added ensure  9). IM recommendations  10).  Case management help with discharge planning to SNF when bed available: the patient is stable for discharge as soon as a bed is available - per case management note a pre certification for the SNF was never received and they will have a bed available on Monday    Disposition: Anticipate discharge to [facility] Monday per case management - would ideally liked the patient to have gone no later then Saturday - patient was stable for discharge on Friday - await bed availability at [facility] - if precert and bed become available then call and discharge order will be given  -Call with any questions or concerns: [phone]      Mala Bonilla.  Vianey Lemons, [facility]

## 2019-05-05 NOTE — PROGRESS NOTES
Physical Therapy  Facility/Department: [facility] 5T ORTHO/NEURO  Daily Treatment Note  NAME: Diallo Fowler  : 1934  MRN: [id number]    Date of Service: 2019    Discharge Recommendations: Diallo Fowler scored a 16/24 on the AM-PAC short mobility form. Current research shows that an AM-PAC score of 17 or less is typically not associated with a discharge to the patient's home setting. Based on the patients AM-PAC score and their current functional mobility deficits, it is recommended that the patient have 3-5 sessions per week of Physical Therapy at d/c to increase the patients independence. PT Equipment Recommendations  Equipment Needed: No  Other: defer to next level of care    Patient Diagnosis(es): The encounter diagnosis was Status post total replacement of right hip.     has a past medical history of Arthritis, Edema, Hypercholesteremia, Hypertension, and Seizure ([facility] [address] has a past surgical history that includes Appendectomy; joint replacement; Salpingo-oophorectomy (2017); Colonoscopy; and Total hip arthroplasty (Right, 2019). Restrictions  Position Activity Restriction  Other position/activity restrictions: anterolateral approach, FWBAT, No hip adduction beyond neutral, no external rotation and no hyperextension  Subjective   General  Chart Reviewed: Yes  Additional Pertinent Hx: arthritis, edema, hypercholesteremia, HTN, nocturnal seizures, left THR 2008  Family / Caregiver Present: No  Referring Practitioner: Charley Biswas  Subjective  Subjective: Pt found sitting in chair and agreeable to PT. Pain rated 6/10 upon arrival but 4/10 at rest after completion.             Objective      Transfers  Sit to Stand: Minimal Assistance(slow and effortful)  Stand to sit: Contact guard assistance  Ambulation  Ambulation?: Yes  Ambulation 1  Surface: level tile  Device: Rolling Walker  Assistance: Contact guard assistance  Quality of Gait: slow gait speed, verbal cues to increase R step height and for R knee flexion during swing, pt lands with foot flat, does rely on B UE heavily  Distance: 150'  Comments: no LOB and good technique with turns, fearful of WB thru R LE  Stairs/Curb  Stairs?: No        Exercises  Quad Sets: 5\" x 10 bilat  Heelslides: x 10 right seated  Gluteal Sets: 5\" x 10 bilat  Knee Long Arc Quad: x 10 right  Ankle Pumps: x 20 bilat      Assessment   Body structures, Functions, Activity limitations: Decreased functional mobility ; Decreased ROM; Decreased strength;Decreased endurance  Assessment: Pt presents with min A with sit to stand and CGA for gait - not safe to return home at this time as pt lives alone. Will continue to see while in hospital for maximizing functional mobility. Pt would benefit from therapy at D/C.   Treatment Diagnosis: Decreased functional mobility post right NE  Prognosis: Good  Patient Education: Pt educated on FWBAT - verbalized understanding  REQUIRES PT FOLLOW UP: Yes  Activity Tolerance  Activity Tolerance: Patient Tolerated treatment well  Activity Tolerance: pt fearful of WB thru R LE     OutComes Score    AM-PAC Score  AM-PAC Inpatient Mobility Raw Score : 16  AM-PAC Inpatient T-Scale Score : 40.78  Mobility Inpatient CMS 0-100% Score: 54.16  Mobility Inpatient CMS G-Code Modifier : CK          Goals  Short term goals  Time Frame for Short term goals: Discharge - all ongoing 5/5  Short term goal 1: Bed mobility with SBA  Short term goal 2: Transfers with SBA  Short term goal 3: Ambulate 100 ft with RW and SBA  Short term goal 4: Up and down 2-4 steps with rail and SBA  Short term goal 5: up and down curb step with walker and SBA  Patient Goals   Patient goals : \"to not fall\"    Plan    Plan  Times per week: 7  Times per day: Twice a day  Current Treatment Recommendations: Strengthening, ROM, Gait Training, Stair training, Functional Mobility Training, Home Exercise Program  Safety Devices  Type of devices: Nurse notified, Chair alarm in place, Call

## 2019-05-05 NOTE — PROGRESS NOTES
Physical Therapy  Facility/Department: [facility] 5T ORTHO/NEURO  Daily Treatment Note  NAME: Trey Obrien  : 1934  MRN: [id number]    Date of Service: 2019    Discharge Recommendations: Trey Obrien scored a 16/24 on the AM-PAC short mobility form. Current research shows that an AM-PAC score of 17 or less is typically not associated with a discharge to the patient's home setting. Based on the patients AM-PAC score and their current functional mobility deficits, it is recommended that the patient have 3-5 sessions per week of Physical Therapy at d/c to increase the patients independence. Patient would benefit from continued therapy after discharge   PT Equipment Recommendations  Equipment Needed: No  Other: defer to next level of care    Patient Diagnosis(es): The encounter diagnosis was Status post total replacement of right hip.     has a past medical history of Arthritis, Edema, Hypercholesteremia, Hypertension, and Seizure ([facility] [address] has a past surgical history that includes Appendectomy; joint replacement; Salpingo-oophorectomy (2017); Colonoscopy; and Total hip arthroplasty (Right, 2019). Restrictions  Position Activity Restriction  Other position/activity restrictions: anterolateral approach, FWBAT, No hip adduction beyond neutral, no external rotation and no hyperextension  Subjective   General  Chart Reviewed: Yes  Additional Pertinent Hx: arthritis, edema, hypercholesteremia, HTN, nocturnal seizures, left THR   Family / Caregiver Present: No  Referring Practitioner: Jessie Wan  Subjective  Subjective: Pt found sitting in chair and agreeable to PT. Pain rated 2/10 upon arrival and at completion.             Objective   Bed mobility  Sit to Supine: Minimal assistance(for R LE > L LE)  Scooting: Stand by assistance(up in bed)  Transfers  Sit to Stand: Minimal Assistance(slow and effortful)  Stand to sit: Contact guard assistance  Ambulation  Ambulation?: Yes  Ambulation 1  Surface: level tile  Device: Rolling Walker  Assistance: Contact guard assistance  Quality of Gait: slow gait speed, improved R step height and knee flexion this PM vs this AM, pt lands with foot flat, does rely on B UE heavily  Distance: 150'  Comments: no LOB and good technique with turns, fearful of WB thru R LE  Stairs/Curb  Stairs?: No        Exercises  Quad Sets: 5\" x 10 bilat  Heelslides: x 10 right supine  Gluteal Sets: 5\" x 10 bilat  Knee Short Arc Quad: x 10 right  Ankle Pumps: x 15 bilat        Assessment   Body structures, Functions, Activity limitations: Decreased functional mobility ; Decreased ROM; Decreased strength;Decreased endurance  Assessment: Pt presents with min A with sit to stand and CGA for gait - not safe to return home at this time as pt lives alone. Pt did have improved gait pattern with R LE this PM vs this AM with improved R step height and knee flexion during swing. Will continue to see while in hospital for maximizing functional mobility. Pt would benefit from therapy at D/C.     Treatment Diagnosis: Decreased functional mobility post right NE  Prognosis: Good  Patient Education: Pt educated on FWBAT - verbalized understanding  REQUIRES PT FOLLOW UP: Yes  Activity Tolerance  Activity Tolerance: Patient Tolerated treatment well  Activity Tolerance: pt fearful of WB thru R LE     OutComes Score    AM-PAC Score  AM-PAC Inpatient Mobility Raw Score : 16  AM-PAC Inpatient T-Scale Score : 40.78  Mobility Inpatient CMS 0-100% Score: 54.16  Mobility Inpatient CMS G-Code Modifier : CK          Goals  Short term goals  Time Frame for Short term goals: Discharge - all ongoing 5/5 PM  Short term goal 1: Bed mobility with SBA  Short term goal 2: Transfers with SBA  Short term goal 3: Ambulate 100 ft with RW and SBA  Short term goal 4: Up and down 2-4 steps with rail and SBA  Short term goal 5: up and down curb step with walker and SBA  Patient Goals   Patient goals : \"to not fall\"    Plan Plan  Times per week: 7  Times per day: Twice a day  Current Treatment Recommendations: Strengthening, ROM, Gait Training, Stair training, Functional Mobility Training, Home Exercise Program  Safety Devices  Type of devices: Nurse notified, Left in bed(OT present upon completion of PT session and will get pt needs in place with bed alarm once OT completed)     Therapy Time   Individual Concurrent Group Co-treatment   Time In 1305         Time Out 1335         Minutes 30         Timed Code Treatment Minutes: [facility], PT, DPT [id number]    If patient is discharged prior to next treatment, this note will serve as the discharge summary.

## 2019-05-06 ENCOUNTER — TELEPHONE (OUTPATIENT)
Dept: ORTHOPEDIC SURGERY | Age: 84
End: 2019-05-06

## 2019-05-06 VITALS
TEMPERATURE: 98.2 F | SYSTOLIC BLOOD PRESSURE: 113 MMHG | DIASTOLIC BLOOD PRESSURE: 60 MMHG | HEART RATE: 83 BPM | RESPIRATION RATE: 16 BRPM | BODY MASS INDEX: 26.93 KG/M2 | OXYGEN SATURATION: 97 % | WEIGHT: 167.55 LBS | HEIGHT: 66 IN

## 2019-05-06 PROCEDURE — 97110 THERAPEUTIC EXERCISES: CPT

## 2019-05-06 PROCEDURE — 6370000000 HC RX 637 (ALT 250 FOR IP): Performed by: ORTHOPAEDIC SURGERY

## 2019-05-06 PROCEDURE — 97116 GAIT TRAINING THERAPY: CPT

## 2019-05-06 PROCEDURE — 2580000003 HC RX 258: Performed by: ORTHOPAEDIC SURGERY

## 2019-05-06 PROCEDURE — 6360000002 HC RX W HCPCS: Performed by: ORTHOPAEDIC SURGERY

## 2019-05-06 RX ADMIN — LEVETIRACETAM 250 MG: 500 TABLET ORAL at 09:44

## 2019-05-06 RX ADMIN — FAMOTIDINE 20 MG: 20 TABLET ORAL at 09:39

## 2019-05-06 RX ADMIN — OXYCODONE HYDROCHLORIDE AND ACETAMINOPHEN 1 TABLET: 5; 325 TABLET ORAL at 13:34

## 2019-05-06 RX ADMIN — Medication 10 ML: at 09:44

## 2019-05-06 RX ADMIN — ENOXAPARIN SODIUM 40 MG: 40 INJECTION SUBCUTANEOUS at 09:40

## 2019-05-06 RX ADMIN — OXYCODONE HYDROCHLORIDE AND ACETAMINOPHEN 1 TABLET: 5; 325 TABLET ORAL at 05:14

## 2019-05-06 ASSESSMENT — PAIN DESCRIPTION - PAIN TYPE
TYPE: SURGICAL PAIN
TYPE: SURGICAL PAIN

## 2019-05-06 ASSESSMENT — PAIN DESCRIPTION - PROGRESSION: CLINICAL_PROGRESSION: NOT CHANGED

## 2019-05-06 ASSESSMENT — PAIN DESCRIPTION - LOCATION
LOCATION: HIP
LOCATION: HIP

## 2019-05-06 ASSESSMENT — PAIN SCALES - GENERAL
PAINLEVEL_OUTOF10: 6
PAINLEVEL_OUTOF10: 7

## 2019-05-06 ASSESSMENT — PAIN DESCRIPTION - ONSET: ONSET: ON-GOING

## 2019-05-06 ASSESSMENT — PAIN - FUNCTIONAL ASSESSMENT: PAIN_FUNCTIONAL_ASSESSMENT: PREVENTS OR INTERFERES SOME ACTIVE ACTIVITIES AND ADLS

## 2019-05-06 ASSESSMENT — PAIN DESCRIPTION - FREQUENCY: FREQUENCY: CONTINUOUS

## 2019-05-06 ASSESSMENT — PAIN DESCRIPTION - DESCRIPTORS
DESCRIPTORS: DISCOMFORT
DESCRIPTORS: ACHING

## 2019-05-06 ASSESSMENT — PAIN DESCRIPTION - ORIENTATION
ORIENTATION: RIGHT
ORIENTATION: RIGHT

## 2019-05-06 NOTE — CARE COORDINATION
SNF  Name of [address]  Phone of Facility [phone]  Fax of Facility [phone]    Mode of Transport medical transport  Name of [address]  Number of Transport [phone]  Time of Transport 1430    Manpreet and her family were provided with choice of provider; she and her family are in agreement with the discharge plan.       Care Transitions patient: Sue Reese, RN  [facility]  Case Management Department  Ph: [phone] Fax: [phone]

## 2019-05-06 NOTE — PROGRESS NOTES
Pt is a/o x 4. Vitals are stable. Pt is tolerating general diet, denies nausea/vomiting. Pt is voiding and had a small formed bowel movement this morning. Dressing to R hip is CDI. Pt denies numbness/tingling. Pt discharged to [facility] with belongings.

## 2019-05-06 NOTE — PROGRESS NOTES
Physical Therapy  Facility/Department: [facility] 5T ORTHO/NEURO  Daily Treatment Note  NAME: Dasia Bueno  : 1934  MRN: [id number]    Date of Service: 2019    Discharge Recommendations: Dasia Bueno scored a 17/24 on the AM-PAC short mobility form. Current research shows that an AM-PAC score of 17 or less is typically not associated with a discharge to the patient's home setting. Based on the patients AM-PAC score and their current functional mobility deficits, it is recommended that the patient have 3-5 sessions per week of Physical Therapy at d/c to increase the patients independence. Patient would benefit from continued therapy after discharge   PT Equipment Recommendations  Equipment Needed: No  Other: defer to next level of care    Patient Diagnosis(es): The encounter diagnosis was Status post total replacement of right hip.     has a past medical history of Arthritis, Edema, Hypercholesteremia, Hypertension, and Seizure ([facility] [address] has a past surgical history that includes Appendectomy; joint replacement; Salpingo-oophorectomy (2017); Colonoscopy; and Total hip arthroplasty (Right, 2019). Restrictions  Position Activity Restriction  Other position/activity restrictions: anterolateral approach, FWBAT, No hip adduction beyond neutral, no external rotation and no hyperextension  Subjective   General  Chart Reviewed: Yes  Additional Pertinent Hx: arthritis, edema, hypercholesteremia, HTN, nocturnal seizures, left THR   Subjective  Subjective: Pt found supine in bed. Agreeable to PT. \"I get very nervous. \"  Anticipates d/c to SNF this PM.  Pain Screening  Patient Currently in Pain: Denies       Orientation  Orientation  Overall Orientation Status: Within Functional Limits  Cognition      Objective   Bed mobility  Supine to Sit: Minimal assistance  Sit to Supine: Minimal assistance(assist for R LE)  Transfers  Sit to Stand: Minimal Assistance(from EOB, raised toilet)  Stand to sit: Minimal Assistance  Ambulation  Ambulation?: Yes  Ambulation 1  Device: Rolling Walker  Assistance: Contact guard assistance  Quality of Gait: slow, decreased step length, step to gait pattern, no overt LOB  Distance: 80 ft; 10 ft x 2        Exercises  Comments: x 10 B APs, GS, QS                        Assessment   Body structures, Functions, Activity limitations: Decreased functional mobility ; Decreased ROM; Decreased strength;Decreased endurance  Assessment: Needs extra time and effort with all mobility. Increased anxiety with mobility. Demonstrates good compliance with anterior hip precautions. Requires assist x 1 for all mobility and is not safe to return home alone. Plan is for SNF.   Treatment Diagnosis: Decreased functional mobility post right NE  Prognosis: Good  Patient Education: Pt educated on FWBAT - verbalized understanding  REQUIRES PT FOLLOW UP: Yes     G-Code     OutComes Score                                                  AM-PAC Score  AM-PAC Inpatient Mobility Raw Score : 17  AM-PAC Inpatient T-Scale Score : 42.13  Mobility Inpatient CMS 0-100% Score: 50.57  Mobility Inpatient CMS G-Code Modifier : CK          Goals  Short term goals  Time Frame for Short term goals: Discharge - all ongoing 5/6  Short term goal 1: Bed mobility with SBA  Short term goal 2: Transfers with SBA  Short term goal 3: Ambulate 100 ft with RW and SBA  Short term goal 4: Up and down 2-4 steps with rail and SBA  Short term goal 5: up and down curb step with walker and SBA  Patient Goals   Patient goals : \"to not fall\"    Plan    Plan  Times per week: 7  Times per day: Daily  Current Treatment Recommendations: Strengthening, ROM, Gait Training, Stair training, Functional Mobility Training, Home Exercise Program  Safety Devices  Type of devices: (pt left on stretcher with staff in room, RN aware)     Therapy Time   Individual Concurrent Group Co-treatment   Time In 1604         Time Out 1630         Minutes 26 Timed Code Treatment Minutes:  26    Total Treatment Minutes:  26    If patient is discharged prior to next treatment, this note will serve as the discharge summary.   Jackie Hylton, PT, DPT  [id number]

## 2019-05-06 NOTE — CARE COORDINATION
Received a call from [facility] letting me know they got precert. [facility].      Electronically signed by Nhan Orta RN on 5/6/2019 at 8:48 AM  [phone]

## 2019-05-06 NOTE — PROGRESS NOTES
Orthopaedic Surgery Fellow Post Operative NE Rounding Progress Note    History of Present Illness:  Poonam Chacon is a 80 y.o. female who was seen this morning. There were no events overnight. Pain is controlled on percocet. She has been up and ambulating with physical therapy and going further each day. She has been eating well and has been having regular bowel movements. She denies any dizziness, fevers, chills, shortness of breath, chest pain, LE paresthesias, or any other current complaints. Medical History:  Patient's medications, allergies, past medical, surgical, social and family histories were reviewed and updated as appropriate. They are as noted. Social History     Socioeconomic History    Marital status:       Spouse name: Not on file    Number of children: Not on file    Years of education: Not on file    Highest education level: Not on file   Occupational History    Not on file   Social Needs    Financial resource strain: Not on file    Food insecurity:     Worry: Not on file     Inability: Not on file    Transportation needs:     Medical: Not on file     Non-medical: Not on file   Tobacco Use    Smoking status: Never Smoker    Smokeless tobacco: Never Used   Substance and Sexual Activity    Alcohol use: No    Drug use: No    Sexual activity: Not on file   Lifestyle    Physical activity:     Days per week: Not on file     Minutes per session: Not on file    Stress: Not on file   Relationships    Social connections:     Talks on phone: Not on file     Gets together: Not on file     Attends Yazidism service: Not on file     Active member of club or organization: Not on file     Attends meetings of clubs or organizations: Not on file     Relationship status: Not on file    Intimate partner violence:     Fear of current or ex partner: Not on file     Emotionally abused: Not on file     Physically abused: Not on file     Forced sexual activity: Not on file   Other Topics Concern    Not on file   Social History Narrative    Not on file       Allergies   Allergen Reactions    Oxycodone Other (See Comments)     Depression    Norvasc [Amlodipine Besylate] Anxiety     No current facility-administered medications on file prior to encounter. Current Outpatient Medications on File Prior to Encounter   Medication Sig Dispense Refill    lisinopril (PRINIVIL;ZESTRIL) 20 MG tablet Take 20 mg by mouth nightly      simvastatin (ZOCOR) 10 MG tablet Take 1 tablet by mouth nightly 30 tablet 5    Cholecalciferol (VITAMIN D3) 1000 units TABS Take by mouth nightly       Magnesium Hydroxide (MAGNESIA PO) Take 500 mg by mouth nightly        family history is not on file.   Past Medical History:   Diagnosis Date    Arthritis     Edema 04/22/2019    lower legs and feet    Hypercholesteremia     Hypertension     Seizure ([address]     had few episodes of probable nocturnal seizures (7/2015, 8/2018, 3/2019), negative eeg and MRI     Past Medical History:   Diagnosis Date    Arthritis     Edema 04/22/2019    lower legs and feet    Hypercholesteremia     Hypertension     Seizure ([address]     had few episodes of probable nocturnal seizures (7/2015, 8/2018, 3/2019), negative eeg and MRI     Active Ambulatory Problems     Diagnosis Date Noted    No Active Ambulatory Problems     Resolved Ambulatory Problems     Diagnosis Date Noted    PMB (postmenopausal bleeding) 12/11/2017    Uterine polyp 12/11/2017    Ovarian cyst 12/11/2017    Tongue pain 07/03/2018    Acute cystitis without hematuria 07/03/2018     Past Medical History:   Diagnosis Date    Arthritis     Edema 04/22/2019    Hypercholesteremia     Hypertension     Seizure ([address]        Review of Systems:  Gen: No dizziness, fevers, chills  HEENT: Negative for double vision, visual changes  CV: Negative for chest pain, palpitations  Lungs: Negative for shortness of breath or wheezing  Abdomen: Negative for abdominal pain or bloating   Neurological: Negative for numbness, paresthesias, or weakness  Psychiatric: Displaying appropriate mentation, judgement, and decision making  Urological: Negative for urinary retention  Skin: warm, well perfused, dressing in place over hip    Vital Signs:  Vitals:    05/06/19 0911   BP: 115/62   Pulse: 88   Resp: 16   Temp: 97.9 °F (36.6 °C)   SpO2: 96%     Height: 5' 6\" (167.6 cm), Weight: 167 lb 8.8 oz (76 kg), Body mass index is 27.04 kg/m². ,      Physical Exam:     Appearance: Jenaro Viveros is alert, oriented x 3, resting comforably, no acute distress. Right Hip Exam:  Dressing is clean, dry, and intact. Compartments are soft and compressible. Flexion and extension is intact although limited due to post operative status. Motor intact with knee flexion, knee extension, dorsiflexion, EHL, and plantar flexion. Sensation intact on the dorsal and plantar aspect of the foot. Negative Chris's sign. +pedal edema which is chronic in nature. Compression stockings in place. +DP/PT pulse, foot warm with brisk capillary refill < 2 seconds. Assessment:  1). S/p right Total hip arthroplasty, surgery date 5/1/19    Treatment Plan:    1). Maintain dressing, she may shower today with assistance - leave dressing in place  2). Pain control PRN  3). DVT ppx: Lovenox/ASA while in the hospital   4). Ice PRN  5). Encourage diet and fluids  6). GI ppx: pepcid BID and tums PRN  7). PT/OT, increase activity as tolerated per protocol, WBAT-hip precautions based on approach   8). Nutritional support - added ensure  9). IM recommendations  10). Appreciate case management's help with discharge planning to SNF todat at 1:00 pm    Disposition: Anticipate discharge to [facility]/SNF Monday per case management -Call with any questions or concerns: [phone]      Tara Marte.  Varghese Orozco, [address] [place] [address] [facility]

## 2019-05-06 NOTE — DISCHARGE INSTR - COC
Continuity of Care Form    Patient Name: Trey Obrien   :  1934  MRN:  [id number]    Admit date:  2019  Discharge date:  ***    Code Status Order: Full Code   Advance Directives:   Advance Care Flowsheet Documentation     Date/Time Healthcare Directive Type of Healthcare Directive Copy in [address] Agent's Name Healthcare Agent's Phone Number    19 1556  Yes, patient has an advance directive for healthcare treatment  Durable power of  for health care  No, copy requested from family  Healthcare power of   --  --    19 0952  Yes, patient has an advance directive for healthcare treatment  --  No, copy requested from family  --  --  --          Admitting Physician:  Ibeth Chan MD  PCP: MOLLY Lawton    Discharging Nurse: [facility] Unit/Room#: 6274/4197-90  Discharging Unit Phone Number: ***    Emergency Contact:   Extended Emergency Contact Information  Primary Emergency Contact: Laura Guzman   [place] [address] [address]  Phone: [phone]  Relation: Child    Past Surgical History:  Past Surgical History:   Procedure Laterality Date    APPENDECTOMY      COLONOSCOPY      JOINT REPLACEMENT      left    SALPINGO-OOPHORECTOMY  2017    robotic, and hysteroscopy d&c, polypectomy     TOTAL HIP ARTHROPLASTY Right 2019    RIGHT HIP TOTAL ARTHROPLASTY performed by Ibeth Chan MD at [address]       Immunization History:   Immunization History   Administered Date(s) Administered    Influenza, MDCK Quadv, IM, PF (Flucelvax 4 yrs and older) 2017    Pneumococcal 13-valent Conjugate (Shelva Cellar) 2016    Pneumococcal Polysaccharide (Sxsiphxoe08) 2013       Active Problems:  Patient Active Problem List   Diagnosis Code    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    Essential hypertension I10       Isolation/Infection:   Isolation          No Isolation Nurse Assessment:  Last Vital Signs: /75   Pulse 81   Temp 98.4 °F (36.9 °C) (Oral)   Resp 16   Ht 5' 6\" (1.676 m)   Wt 167 lb 8.8 oz (76 kg)   SpO2 96%   BMI 27.04 kg/m²     Last documented pain score (0-10 scale): Pain Level: 6  Last Weight:   Wt Readings from Last 1 Encounters:   05/02/19 167 lb 8.8 oz (76 kg)     Mental Status:  oriented and alert    IV Access:  - None    Nursing Mobility/ADLs:  Walking   Assisted  Transfer  Assisted  Bathing  Assisted  Dressing  Assisted  Toileting  Assisted  Feeding  Independent  Med Admin  Assisted  Med Delivery   whole    Wound Care Documentation and Therapy:        Elimination:  Continence:   · Bowel: Yes  · Bladder: Yes  Urinary Catheter: None   Colostomy/Ileostomy/Ileal Conduit: No       Date of Last BM: 5/6    Intake/Output Summary (Last 24 hours) at 5/6/2019 0853  Last data filed at 5/6/2019 0412  Gross per 24 hour   Intake 360 ml   Output 0 ml   Net 360 ml     I/O last 3 completed shifts: In: 360 [P.O.:360]  Out: 0     Safety Concerns: At Risk for Falls    Impairments/Disabilities:      None    Nutrition Therapy:  Current Nutrition Therapy:   - Oral Diet:  General    Routes of Feeding: Oral  Liquids: Thin Liquids  Daily Fluid Restriction: no  Last Modified Barium Swallow with Video (Video Swallowing Test): not done    Treatments at the Time of Hospital Discharge:   Respiratory Treatments: ***  Oxygen Therapy:  is not on home oxygen therapy.   Ventilator:    - No ventilator support    Rehab Therapies: Physical Therapy  Weight Bearing Status/Restrictions: No weight bearing restirctions  Other Medical Equipment (for information only, NOT a DME order):  walker  Other Treatments: ***    Patient's personal belongings (please select all that are sent with patient):  Chris    RN SIGNATURE:  Electronically signed by vEan Bernabe RN on 5/6/19 at 10:33 AM    CASE MANAGEMENT/SOCIAL WORK SECTION    Inpatient Status Date: 05/01/2019    Readmission Risk Assessment Score:  Readmission Risk              Risk of Unplanned Readmission:        8           Discharging to Facility/ Agency   · Name: [facility]  · Address:  · Phone:[phone]  · Fax:[phone]    ·     / signature: Electronically signed by Mary Jiménez RN on 5/6/19 at 9:05 AM    PHYSICIAN SECTION    Prognosis: Good    Condition at Discharge: Stable    Rehab Potential (if transferring to Rehab): Good    Recommended Labs or Other Treatments After Discharge: PT/OT    Physician Certification: I certify the above information and transfer of Viri Yadav  is necessary for the continuing treatment of the diagnosis listed and that she requires [facility] for less 30 days. Update Admission H&P: No change in H&P    PHYSICIAN SIGNATURE: Any Pierce

## 2019-05-06 NOTE — PROGRESS NOTES
Pt is alert and oriented times 4. VSS. Pain is controlled with PRN norco. Pt is ambulating with assist of of one and walker. Pt has had adequate urine output. Pt denies any needs at this time. Will continue to monitor.

## 2019-05-06 NOTE — PLAN OF CARE
Problem: Pain - Acute:  Goal: Pain level will decrease  Outcome: Ongoing   Pain is controlled with PRN [place]. Pt reports relief. VSS. Will continue to monitor  Problem: Pain:  Goal: Pain level will decrease  Outcome: Ongoing   Patient is a high fall risk. All fall precautions in place: bed alarm on, nonskid socks on pt, call light within reach, bed locked in lowest position. Will continue to monitor pt safety.

## 2019-05-15 ENCOUNTER — HOSPITAL ENCOUNTER (OUTPATIENT)
Dept: VASCULAR LAB | Age: 84
Discharge: HOME | End: 2019-05-15
Payer: MEDICARE

## 2019-05-15 ENCOUNTER — HOSPITAL ENCOUNTER (EMERGENCY)
Age: 84
Discharge: NURSING, REHAB OR LONG-TERM CARE | End: 2019-05-15
Attending: EMERGENCY MEDICINE
Payer: MEDICARE

## 2019-05-15 ENCOUNTER — APPOINTMENT (OUTPATIENT)
Dept: GENERAL RADIOLOGY | Age: 84
End: 2019-05-15
Payer: MEDICARE

## 2019-05-15 ENCOUNTER — OFFICE VISIT (OUTPATIENT)
Dept: ORTHOPEDIC SURGERY | Age: 84
End: 2019-05-15

## 2019-05-15 VITALS
BODY MASS INDEX: 27.32 KG/M2 | TEMPERATURE: 97.5 F | DIASTOLIC BLOOD PRESSURE: 51 MMHG | HEIGHT: 66 IN | RESPIRATION RATE: 17 BRPM | HEART RATE: 86 BPM | SYSTOLIC BLOOD PRESSURE: 120 MMHG | OXYGEN SATURATION: 99 % | WEIGHT: 170 LBS

## 2019-05-15 VITALS
BODY MASS INDEX: 22.66 KG/M2 | DIASTOLIC BLOOD PRESSURE: 60 MMHG | HEIGHT: 66 IN | WEIGHT: 141 LBS | HEART RATE: 66 BPM | SYSTOLIC BLOOD PRESSURE: 120 MMHG

## 2019-05-15 DIAGNOSIS — R60.9 SWELLING: Primary | ICD-10-CM

## 2019-05-15 DIAGNOSIS — Z96.641 HISTORY OF TOTAL RIGHT HIP REPLACEMENT: Primary | ICD-10-CM

## 2019-05-15 DIAGNOSIS — M79.89 RIGHT LEG SWELLING: ICD-10-CM

## 2019-05-15 DIAGNOSIS — I82.4Y1 ACUTE DEEP VEIN THROMBOSIS (DVT) OF PROXIMAL VEIN OF RIGHT LOWER EXTREMITY (HCC): Primary | ICD-10-CM

## 2019-05-15 LAB
A/G RATIO: 1 (ref 1.1–2.2)
ALBUMIN SERPL-MCNC: 3 G/DL (ref 3.4–5)
ALP BLD-CCNC: 83 U/L (ref 40–129)
ALT SERPL-CCNC: 20 U/L (ref 10–40)
ANION GAP SERPL CALCULATED.3IONS-SCNC: 10 MMOL/L (ref 3–16)
APTT: 30.9 SEC (ref 26–36)
AST SERPL-CCNC: 15 U/L (ref 15–37)
BILIRUB SERPL-MCNC: <0.2 MG/DL (ref 0–1)
BUN BLDV-MCNC: 17 MG/DL (ref 7–20)
CALCIUM SERPL-MCNC: 9.4 MG/DL (ref 8.3–10.6)
CHLORIDE BLD-SCNC: 101 MMOL/L (ref 99–110)
CO2: 28 MMOL/L (ref 21–32)
CREAT SERPL-MCNC: 0.6 MG/DL (ref 0.6–1.2)
GFR AFRICAN AMERICAN: >60
GFR NON-AFRICAN AMERICAN: >60
GLOBULIN: 2.9 G/DL
GLUCOSE BLD-MCNC: 95 MG/DL (ref 70–99)
HCT VFR BLD CALC: 27 % (ref 36–48)
HEMOGLOBIN: 8.8 G/DL (ref 12–16)
INR BLD: 1.13 (ref 0.86–1.14)
MCH RBC QN AUTO: 29.9 PG (ref 26–34)
MCHC RBC AUTO-ENTMCNC: 32.6 G/DL (ref 31–36)
MCV RBC AUTO: 91.7 FL (ref 80–100)
PDW BLD-RTO: 14.3 % (ref 12.4–15.4)
PLATELET # BLD: 379 K/UL (ref 135–450)
PMV BLD AUTO: 7.9 FL (ref 5–10.5)
POTASSIUM SERPL-SCNC: 4.6 MMOL/L (ref 3.5–5.1)
PROTHROMBIN TIME: 12.9 SEC (ref 9.8–13)
RBC # BLD: 2.95 M/UL (ref 4–5.2)
SODIUM BLD-SCNC: 139 MMOL/L (ref 136–145)
TOTAL PROTEIN: 5.9 G/DL (ref 6.4–8.2)
WBC # BLD: 7.3 K/UL (ref 4–11)

## 2019-05-15 PROCEDURE — 99283 EMERGENCY DEPT VISIT LOW MDM: CPT

## 2019-05-15 PROCEDURE — 99024 POSTOP FOLLOW-UP VISIT: CPT | Performed by: ORTHOPAEDIC SURGERY

## 2019-05-15 PROCEDURE — 85730 THROMBOPLASTIN TIME PARTIAL: CPT

## 2019-05-15 PROCEDURE — 93971 EXTREMITY STUDY: CPT

## 2019-05-15 PROCEDURE — 80053 COMPREHEN METABOLIC PANEL: CPT

## 2019-05-15 PROCEDURE — 6370000000 HC RX 637 (ALT 250 FOR IP): Performed by: EMERGENCY MEDICINE

## 2019-05-15 PROCEDURE — 85027 COMPLETE CBC AUTOMATED: CPT

## 2019-05-15 PROCEDURE — 71046 X-RAY EXAM CHEST 2 VIEWS: CPT

## 2019-05-15 PROCEDURE — 85610 PROTHROMBIN TIME: CPT

## 2019-05-15 RX ORDER — ASPIRIN 81 MG/1
81 TABLET ORAL DAILY
Qty: 30 TABLET | Refills: 0 | Status: SHIPPED | OUTPATIENT
Start: 2019-05-15

## 2019-05-15 RX ADMIN — RIVAROXABAN 15 MG: 15 TABLET, FILM COATED ORAL at 16:23

## 2019-05-15 NOTE — ED NOTES
PT placed on bedpan prior to D/C. PT dressed, D/C orders and medications reviewed with PT. PT was given coupon for Xarelto to take with her to rehab facility. PT taken out to main ED [facility] in wheelchair by myself and helped into car, Daughter transporting PT back to facility.        Naila Isaac RN  05/15/19 1881

## 2019-05-15 NOTE — ED PROVIDER NOTES
I independently performed a history and physical on Manpreet Puckett. All diagnostic, treatment, and disposition decisions were made by myself in conjunction with the advanced practice provider. Briefly, this is a 80 y.o. female here for painful, swollen tender right lower extremity. Her dysfunction. Symptoms are moderate. She is rather recently postop from a right hip procedure by orthopedics. Symptoms are nonradiating. Denies any chest symptoms such as pain, pressure or shortness of breath. No syncope or near-syncope  See FELIPE note      On exam:  Constitutional:  Well developed, well nourished, non-toxic appearance   Eyes:  PERRL, conjunctiva normal   HENT:  Atraumatic,   Respiratory:  No respiratory distress, normal breath sounds, no rales, no wheezing   Cardiovascular:  Normal rate, normal rhythm, no murmurs,   GI:  Soft, nondistended, nontender,   Musculoskeletal: Tenderness and swelling to the right calf region. Bilateral lower extremities are neurovascularly intact, no deformities. Integument:  no rashes on exposed surfaces  Neurologic:  Alert & oriented x 3,   no focal deficits noted   Psychiatric:  Speech and behavior appropriate. No agitation.           information found for this patient   Narrative   Lower Extremities DVT Study        Demographics        Patient Name       ISAAC Ricketts        Date of Study      05/15/2019         Gender              Female        Patient Number     [id number]         Date of Birth       1934        Visit Number       [id number]          Age                 85 year(s)        Accession Number   [id number]          Room Number        Corporate ID       [id number]            Sonographer         Shalonda Adams RVT, RDMS, AB,                                                              OB/GYN        Ordering Physician Brian,        Interpreting        I Vascular                      Erin Knapp MD         Physician           Rainelle Nageotte MD,                                                              [facility]       Procedure       Type of Study:        Veins:Lower Extremities DVT Study, VL EXTREMITY VENOUS DUPLEX RIGHT.      Vascular Sonographer Report       Additional Indications:right leg swelling. S/P right hip replacement 2 weeks   ago.       Impressions   Right Impression   Acute totally occluding deep vein thrombosis involving the right peroneal vein   zone 6-7. No other evidence of deep vein or superficial vein thrombosis involving the   right lower extremity and the left common femoral vein. Calf and ankle edema noted. Spoke with Breann at Dr. Siddhartha Hemphill office. Pt sent to the ER.       Conclusions        Summary        Acute totally occluding deep vein thrombosis involving the right peroneal    vein zone 6-7.        Signature        ------------------------------------------------------------------    Electronically signed by Rainelle Nageotte MD, [facility] (Interpreting    physician) on 05/15/2019 at 02:06 PM    ------------------------------------------------------------------       Patient Status:STAT. [address] [place] [address] Vascular Lab.       Velocities are measured in cm/s ; Diameters are measured in mm       Right Lower Extremities DVT Study Measurements   Right 2D Measurements   +------------------------+----------+---------------+----------+   ! Location                ! Visualized! Compressibility! Thrombosis! +------------------------+----------+---------------+----------+   ! Sapheno Femoral Junction! Yes       ! Yes            !None      !   +------------------------+----------+---------------+----------+   ! GSV Thigh               ! Yes       ! Yes            !None      !   +------------------------+----------+---------------+----------+   ! Common Femoral          ! Yes       ! Yes            !None      ! +------------------------+----------+---------------+----------+   ! Prox Femoral            ! Yes       ! Yes            !None      !   +------------------------+----------+---------------+----------+   ! Mid Femoral             ! Yes       ! Yes            !None      !   +------------------------+----------+---------------+----------+   ! Dist Femoral            ! Yes       ! Yes            !None      !   +------------------------+----------+---------------+----------+   ! Deep Femoral            ! Yes       ! Yes            !None      !   +------------------------+----------+---------------+----------+   ! Popliteal               ! Yes       ! Yes            !None      !   +------------------------+----------+---------------+----------+   ! GSV Below Knee          ! Yes       ! Yes            !None      !   +------------------------+----------+---------------+----------+   ! Gastroc                 ! Yes       ! Yes            !None      !   +------------------------+----------+---------------+----------+   ! PTV                     ! Yes       ! Yes            !None      !   +------------------------+----------+---------------+----------+   ! Peroneal                ! Yes       !No             !Acute     !   +------------------------+----------+---------------+----------+   ! GSV Calf                ! Yes       ! Yes            !None      !   +------------------------+----------+---------------+----------+   ! SSV                     ! Yes       ! Yes            !None      !   +------------------------+----------+---------------+----------+       Right Doppler Measurements   +------------------------+------+------+------------+   ! Location                !Signal!Reflux! Reflux (sec)! +------------------------+------+------+------------+   ! Sapheno Femoral Junction! Phasic!      !            !   +------------------------+------+------+------------+   ! Common Femoral          !Phasic!      !            ! +------------------------+------+------+------------+   ! Femoral                 !Phasic!      !            !   +------------------------+------+------+------------+   ! Deep Femoral            !Phasic!      !            !   +------------------------+------+------+------------+   ! Popliteal               !Phasic!      !            !   +------------------------+------+------+------------+       Left Lower Extremities DVT Study Measurements   Left 2D Measurements   +--------------+----------+---------------+----------+   ! Location      ! Visualized! Compressibility! Thrombosis! +--------------+----------+---------------+----------+   ! Common Femoral!Yes       ! Yes            !None      !   +--------------+----------+---------------+----------+       Left Doppler Measurements   +--------------+------+------+------------+   ! Location      !Signal!Reflux! Reflux (sec)! +--------------+------+------+------------+   ! Common Femoral!Phasic!      !            !   +--------------+------+------+------------+       Order History     Open Order Details   PACS Images      Show images for VL Extremity Venous Right   Results History Report     View Report       MDM    Patient has an acute DVT the right lower extremity after her hip surgery. She has no active bleeding. We'll initiate Xarelto. I did consult with Kathleen of pharmacy. Patient was also on a high dose aspirin and recommend discontinuing that high dose. Critical Care  There was a high probability of life-threatening clinical deterioration in the patient's condition requiring my urgent intervention. Total critical care time with the patient was 31 minutes excluding separately reportable procedures.   Critical care required due to patients Acute DVT      SEE FELIPE NOTE      Patient Referrals:  Medina Landry, [address]  [phone]    In 2 days        Discharge Medications:  New Prescriptions    ASPIRIN EC 81 MG EC TABLET    Take 1 tablet by mouth daily    RIVAROXABAN (XARELTO) 15 MG TABS TABLET    Take 1 tablet by mouth 2 times daily (with meals) for 21 days       FINAL IMPRESSION  1. Acute deep vein thrombosis (DVT) of proximal vein of right lower extremity (HCC)        Blood pressure 94/61, pulse 99, temperature 97.5 °F (36.4 °C), temperature source Infrared, resp. rate 17, height 5' 6\" (1.676 m), weight 170 lb (77.1 kg), SpO2 94 %. For further details of [facility] emergency department encounter, please see documentation by advanced practice provider.        Eileen Ross III, DO  05/15/19 4286 Ambulatory

## 2019-05-15 NOTE — ED PROVIDER NOTES
[facility]        Pt Name: Ananth Serrano  MRN: [id number]  [place] 1934  Date of evaluation: 5/15/2019  Provider: Abhay Ansari PA-C  PCP: MOLLY Law    This patient was seen and evaluated by the attending physician Mi Tineo, [facility]       Chief Complaint   Patient presents with    Leg Pain     +dvt to right swollen leg- doppler this am       HISTORY OF PRESENT ILLNESS   (Location/Symptom, Timing/Onset, Context/Setting, Quality, Duration, Modifying Factors, Severity)  Note limiting factors. Ananth Serrano is a 80 y.o. female resents to the emergency department today for evaluation for right lower leg pain and swelling. The patient states that one week ago she did have a right hip replacement performed. The patient states that she has noticed some swelling since after the surgery, she initially thought that this was due to to the surgery. Patient states that over the past several days she noticed increasing swelling, she states that she had an ultrasound today which did show a blood clot in her leg. She denies any history of DVT or PE. She has no chest pain or shortness of breath. Patient denies any abdominal pain. No nausea, vomiting or diarrhea. No fever chills. No numbness, tingling or weakness. She states that her hip pain is well-controlled she is not taking any narcotics at this time. She has no other complaints at this time. Nursing Notes were all reviewed and agreed with or any disagreements were addressed  in the HPI. REVIEW OF SYSTEMS    (2-9 systems for level 4, 10 or more for level 5)     Review of Systems   Constitutional: Negative for activity change, appetite change and fever. Respiratory: Negative for shortness of breath. Cardiovascular: Negative for chest pain and leg swelling. Gastrointestinal: Negative for nausea and vomiting.    Musculoskeletal: Positive level: Not on file   Occupational History    Not on file   Social Needs    Financial resource strain: Not on file    Food insecurity:     Worry: Not on file     Inability: Not on file    Transportation needs:     Medical: Not on file     Non-medical: Not on file   Tobacco Use    Smoking status: Never Smoker    Smokeless tobacco: Never Used   Substance and Sexual Activity    Alcohol use: No    Drug use: No    Sexual activity: Not on file   Lifestyle    Physical activity:     Days per week: Not on file     Minutes per session: Not on file    Stress: Not on file   Relationships    Social connections:     Talks on phone: Not on file     Gets together: Not on file     Attends Druze service: Not on file     Active member of club or organization: Not on file     Attends meetings of clubs or organizations: Not on file     Relationship status: Not on file    Intimate partner violence:     Fear of current or ex partner: Not on file     Emotionally abused: Not on file     Physically abused: Not on file     Forced sexual activity: Not on file   Other Topics Concern    Not on file   Social History Narrative    Not on file       SCREENINGS             PHYSICAL EXAM    (up to 7 for level 4, 8 or more for level 5)     ED Triage Vitals [05/15/19 1450]   BP Temp Temp Source Pulse Resp SpO2 Height Weight   (!) 101/56 97.5 °F (36.4 °C) Infrared 99 17 99 % 5' 6\" (1.676 m) 170 lb (77.1 kg)       Physical Exam   Constitutional: She is oriented to person, place, and time. She appears well-developed and well-nourished. HENT:   Head: Normocephalic and atraumatic. Right Ear: External ear normal.   Left Ear: External ear normal.   Nose: Nose normal.   Eyes: Right eye exhibits no discharge. Left eye exhibits no discharge. Neck: Normal range of motion. Neck supple. No tracheal deviation present. Cardiovascular: Normal rate, regular rhythm, normal heart sounds and intact distal pulses.    No murmur !Prox Femoral            !Yes       ! Yes            ! None      ! +------------------------+----------+---------------+----------+ ! Mid Femoral             !Yes       ! Yes            ! None      ! +------------------------+----------+---------------+----------+ ! Dist Femoral            !Yes       ! Yes            ! None      ! +------------------------+----------+---------------+----------+ ! Deep Femoral            !Yes       ! Yes            ! None      ! +------------------------+----------+---------------+----------+ ! Popliteal               !Yes       ! Yes            ! None      ! +------------------------+----------+---------------+----------+ ! GSV Below Knee          ! Yes       ! Yes            ! None      ! +------------------------+----------+---------------+----------+ ! Gastroc                 ! Yes       ! Yes            ! None      ! +------------------------+----------+---------------+----------+ ! PTV                     ! Yes       ! Yes            ! None      ! +------------------------+----------+---------------+----------+ ! Peroneal                !Yes       ! No             !Acute     ! +------------------------+----------+---------------+----------+ ! GSV Calf                ! Yes       ! Yes            ! None      ! +------------------------+----------+---------------+----------+ ! SSV                     ! Yes       ! Yes            ! None      ! +------------------------+----------+---------------+----------+ Right Doppler Measurements +------------------------+------+------+------------+ ! Location                ! Signal!Reflux! Reflux (sec)! +------------------------+------+------+------------+ ! Sapheno Femoral Junction! Phasic!      !            ! +------------------------+------+------+------------+ ! Common Femoral          !Phasic!      !            ! +------------------------+------+------+------------+ ! Femoral                 !Phasic!      !            ! +------------------------+------+------+------------+ ! Deep Femoral            !Phasic!      !            ! +------------------------+------+------+------------+ ! Popliteal               !Phasic!      !            ! +------------------------+------+------+------------+ Left Lower Extremities DVT Study Measurements Left 2D Measurements +--------------+----------+---------------+----------+ ! Location      ! Visualized! Compressibility! Thrombosis! +--------------+----------+---------------+----------+ ! Common Femoral!Yes       ! Yes            ! None      ! +--------------+----------+---------------+----------+ Left Doppler Measurements +--------------+------+------+------------+ ! Location      ! Signal!Reflux! Reflux (sec)! +--------------+------+------+------------+ ! Common Femoral!Phasic!      !            ! +--------------+------+------+------------+         PROCEDURES   Unless otherwise noted below, none     Procedures    CRITICAL CARE TIME   N/A    CONSULTS:  None      EMERGENCY DEPARTMENT COURSE and DIFFERENTIALDIAGNOSIS/MDM:   Vitals:    Vitals:    05/15/19 1450 05/15/19 1518 05/15/19 1530 05/15/19 1626   BP: (!) 101/56 94/61 (!) 101/47 (!) 120/51   Pulse: 99   86   Resp: 17      Temp: 97.5 °F (36.4 °C)      TempSrc: Infrared      SpO2: 99% 94% 99% 99%   Weight: 170 lb (77.1 kg)      Height: 5' 6\" (1.676 m)          Patient was given thefollowing medications:  Medications   rivaroxaban (XARELTO) tablet 15 mg (15 mg Oral Given 5/15/19 1623)       The patient  resents to the emergency department today for evaluation for right lower leg pain and swelling. The patient states that one week ago she did have a right hip replacement performed. The patient states that she has noticed some swelling since after the surgery, she initially thought that this was due to to the surgery. Patient states that over the past several days she noticed increasing swelling, she states that she had an ultrasound today which did show a blood clot in her leg.   She denies any history of DVT or PE. Medication List as of 5/15/2019  4:53 PM                 (Please note that portions ofthis note were completed with a voice recognition program.  Efforts were made to edit the dictations but occasionally words are mis-transcribed.)    Mahesh Taylor PA-C (electronically signed)            Mahesh Taylor PA-C  05/16/19 1005

## 2019-05-15 NOTE — PROGRESS NOTES
The patient returns today she's just 2 weeks status post right total hip arthroplasty. She's been in rehab unit. She says that she is able to get out of bed by herself at this point. She has some concerns that she has swelling both lower extremity is more in the right than the left. She is here with her son today. ROS: Pertinent items are noted in HPI. No notes on file    Past Medical History:  No date: Arthritis  04/22/2019: Edema      Comment:  lower legs and feet  No date: Hypercholesteremia  No date: Hypertension  No date: Seizure [facility])      Comment:  had few episodes of probable nocturnal seizures (7/2015,               8/2018, 3/2019), negative eeg and MRI     Past Surgical History:  No date: APPENDECTOMY  No date: COLONOSCOPY  No date: JOINT REPLACEMENT      Comment:  left  12/11/2017: SALPINGO-OOPHORECTOMY      Comment:  robotic, and hysteroscopy d&c, polypectomy   5/1/2019: TOTAL HIP ARTHROPLASTY; Right      Comment:  RIGHT HIP TOTAL ARTHROPLASTY performed by Daphnie Angel MD at [address] [place] [address] reviewed. No pertinent family history. Social History    Socioeconomic History      Marital status:        Spouse name: None      Number of children: None      Years of education: None      Highest education level: None    Occupational History      None    Social Needs      Financial resource strain: None      Food insecurity:        Worry: None        Inability: None      Transportation needs:        Medical: None        Non-medical: None    Tobacco Use      Smoking status: Never Smoker      Smokeless tobacco: Never Used    Substance and Sexual Activity      Alcohol use: No      Drug use: No      Sexual activity: None    Lifestyle      Physical activity:        Days per week: None        Minutes per session: None      Stress: None    Relationships      Social connections:        Talks on phone: None        Gets together: None        Attends Tenriism service: None        Active member of club or organization: None        Attends meetings of clubs or organizations: None        Relationship status: None      Intimate partner violence:        Fear of current or ex partner: None        Emotionally abused: None        Physically abused: None        Forced sexual activity: None    Other Topics      Concerns:        None    Social History Narrative      None      Current Outpatient Medications:  docusate sodium (COLACE, DULCOLAX) 100 MG CAPS, Take 100 mg by mouth 2 times daily, Disp: 60 capsule, Rfl: 0  aspirin 325 MG EC tablet, Take 1 tablet by mouth 2 times daily (with meals), Disp: 60 tablet, Rfl: 11  lisinopril (PRINIVIL;ZESTRIL) 20 MG tablet, Take 20 mg by mouth nightly, Disp: , Rfl:   levETIRAcetam (KEPPRA) 250 MG tablet, Take 250 mg by mouth One tab in a.m. And 2 tabs at bedtime, Disp: , Rfl:   simvastatin (ZOCOR) 10 MG tablet, Take 1 tablet by mouth nightly, Disp: 30 tablet, Rfl: 5  Cholecalciferol (VITAMIN D3) 1000 units TABS, Take by mouth nightly , Disp: , Rfl:   Magnesium Hydroxide (MAGNESIA PO), Take 500 mg by mouth nightly , Disp: , Rfl:     No current facility-administered medications for this visit. -- Oxycodone -- Other (See Comments)    --  Depression   -- Norvasc [Amlodipine Besylate] -- Anxiety    VITAL SIGNS:  /60   Pulse 66   Ht 5' 6\" (1.676 m)   Wt 141 lb (64 kg)   BMI 22.76 kg/m²   On examination today she denies any fever chills or sweats. Her incision is soft and nontender. She does a some swelling of the both calves but the right is a bit worse than the left. She has a little bit of calf tenderness to palpation but has negative Homans sign. His little bit of thigh swelling on the side as well which she does not have on the other side she has no cords no tenderness along the great veins. She has no shortness of breath and no chest pain. Her hip has good range of motion. She flexes to just above 90°.   At 90° she has about 20°

## 2019-05-16 ASSESSMENT — ENCOUNTER SYMPTOMS
SHORTNESS OF BREATH: 0
NAUSEA: 0
VOMITING: 0

## 2019-05-17 ENCOUNTER — TELEPHONE (OUTPATIENT)
Dept: ORTHOPEDIC SURGERY | Age: 84
End: 2019-05-17

## 2019-05-17 NOTE — TELEPHONE ENCOUNTER
Called and spoke with nisreen -- dresing can be changed apply sterisrips if need and leave open to dry air --

## 2019-05-29 ENCOUNTER — TELEPHONE (OUTPATIENT)
Dept: ORTHOPEDIC SURGERY | Age: 84
End: 2019-05-29

## 2019-05-29 NOTE — TELEPHONE ENCOUNTER
PTS DAUGHTER ERENDIRA CALLED IN STATING SHE CALLED IN YESTERDAY AND HAS NOT HEARD BACK. HER MOTHER HAS BEEN IN REHAB AND THEY ARE NOT SURE ABOUT WHAT TO DO FROM HERE. ERENDIRA STATES SHE HAS QUESTIONS CONCERNING HER MOTHER THAT IS PRETTY IMPORTANT THAT NEEDS ANSWERED. HER MEDS, PT AND OTHER QUESTIONS. PLEASE CALL ERENDIRA BACK [phone].

## 2019-05-29 NOTE — TELEPHONE ENCOUNTER
Called and spoke with daughter -- patient to call PCP for treatment for DVT --  Patient to see Dr Cassie Lorenzana in 2-3 weeks and patient discharged today from [facility] [place] told to schedule for outpatient PT asap -- daughter will call tomorrow and make appts

## 2019-06-04 ENCOUNTER — TELEPHONE (OUTPATIENT)
Dept: FAMILY MEDICINE CLINIC | Age: 84
End: 2019-06-04

## 2019-06-04 ENCOUNTER — HOSPITAL ENCOUNTER (OUTPATIENT)
Dept: PHYSICAL THERAPY | Age: 84
Setting detail: THERAPIES SERIES
Discharge: HOME | End: 2019-06-04
Payer: MEDICARE

## 2019-06-04 NOTE — TELEPHONE ENCOUNTER
Huong Ortiz is calling to see if patient is cleared to start PT tonight @ 5:30 at [facility]

## 2019-06-04 NOTE — FLOWSHEET NOTE
[address] [facility]    Physical Therapy  Cancellation/No-show Note  Patient Name:  Yvette Johnson  :  1934   Date:  2019  Cancelled visits to date: 1  No-shows to date: 0    For today's appointment patient:  [x]  Cancelled  []  Rescheduled appointment  []  No-show     Reason given by patient:  []  Patient ill  []  Conflicting appointment  []  No transportation    []  Conflict with work  []  No reason given  [x]  Other:     Comments:  Re-eval cancelled by provider as pt. Is not yet cleared for therapy by PCP due to DVT. Called to confirm with PCP. Pt. Has appointment with PCP tomorrow and rescheduled appointment for . Another therapist discussed moving appointment with both patient and daughter due to awaiting clearance from PCP.      Electronically signed by:  Pablo Cedillo, PT

## 2019-06-05 ENCOUNTER — OFFICE VISIT (OUTPATIENT)
Dept: FAMILY MEDICINE CLINIC | Age: 84
End: 2019-06-05
Payer: MEDICARE

## 2019-06-05 VITALS
BODY MASS INDEX: 20.98 KG/M2 | DIASTOLIC BLOOD PRESSURE: 80 MMHG | SYSTOLIC BLOOD PRESSURE: 110 MMHG | OXYGEN SATURATION: 98 % | HEART RATE: 50 BPM | WEIGHT: 130 LBS

## 2019-06-05 DIAGNOSIS — I82.4Y1 ACUTE DEEP VEIN THROMBOSIS (DVT) OF PROXIMAL VEIN OF RIGHT LOWER EXTREMITY (HCC): Primary | ICD-10-CM

## 2019-06-05 PROCEDURE — 99213 OFFICE O/P EST LOW 20 MIN: CPT | Performed by: PHYSICIAN ASSISTANT

## 2019-06-05 ASSESSMENT — ENCOUNTER SYMPTOMS
SHORTNESS OF BREATH: 0
COUGH: 0
BACK PAIN: 0
ABDOMINAL PAIN: 0

## 2019-06-05 NOTE — PATIENT INSTRUCTIONS
Josue Adrianotraci was seen today for follow-up. Diagnoses and all orders for this visit:    Acute deep vein thrombosis (DVT) of proximal vein of right lower extremity (HCC)  -     rivaroxaban (XARELTO) 20 MG TABS tablet; Take 1 tablet by mouth daily (with breakfast)       Return in 3 months.

## 2019-06-05 NOTE — PROGRESS NOTES
Subjective:      Patient ID: Gladys Adkins is a 80 y.o. female. HPI  Patient is here today for an ER follow up. She had right hip replacement about 5 weeks ago and 2-3 weeks later she had a right DVT. She has been on Xarelto 15mg bid for almost 21 days. She was doing well in PT but has not had it in 9 days. Needs to get back to it. No chest pain, SOB. Her initial swelling is much better but still has some swelling in both legs, right still a little worse. No SE's from Xarelto so far. Review of Systems   Constitutional: Negative for fatigue and unexpected weight change. HENT: Negative for nosebleeds. Eyes: Negative for visual disturbance. Respiratory: Negative for cough and shortness of breath. Cardiovascular: Positive for leg swelling. Negative for chest pain and palpitations. Gastrointestinal: Negative for abdominal pain. Musculoskeletal: Negative for back pain. Neurological: Negative for dizziness, weakness, numbness and headaches. Objective:   Physical Exam   Constitutional: She is oriented to person, place, and time. Vital signs are normal. She appears well-developed and well-nourished. She is cooperative. Cardiovascular: Normal rate, regular rhythm and normal heart sounds. BLE pitting edema, right lower leg slightly worse than left side, left pedal pulses normal, right dorsalis pedal pulse difficult to palpate   Pulmonary/Chest: Effort normal and breath sounds normal. She has no decreased breath sounds. She has no wheezes. She has no rhonchi. She has no rales. Neurological: She is alert and oriented to person, place, and time. Skin: Skin is warm, dry and intact. No erythema. Assessment:      Antonino Murray was seen today for follow-up. Diagnoses and all orders for this visit:    Acute deep vein thrombosis (DVT) of proximal vein of right lower extremity (HCC)  -     rivaroxaban (XARELTO) 20 MG TABS tablet;  Take 1 tablet by mouth daily (with breakfast)               Plan: Start 20mg of Xarelto when 15mg is finished, return here in 3 months. Wear compression stockings.          Yo Mcdaniel

## 2019-06-06 ENCOUNTER — HOSPITAL ENCOUNTER (OUTPATIENT)
Dept: PHYSICAL THERAPY | Age: 84
Setting detail: THERAPIES SERIES
Discharge: HOME | End: 2019-06-06
Payer: MEDICARE

## 2019-06-06 PROCEDURE — 97116 GAIT TRAINING THERAPY: CPT | Performed by: PHYSICAL THERAPIST

## 2019-06-06 PROCEDURE — 97164 PT RE-EVAL EST PLAN CARE: CPT | Performed by: PHYSICAL THERAPIST

## 2019-06-06 PROCEDURE — 97110 THERAPEUTIC EXERCISES: CPT | Performed by: PHYSICAL THERAPIST

## 2019-06-06 NOTE — FLOWSHEET NOTE
[facility]    Physical Therapy Daily Treatment Note  Date:  2019    Patient Name:  Javi Lala    :  1934  MRN: [id number]  Medical/Treatment Diagnosis Information:  · Diagnosis: M16.11 (ICD-10-CM) - Arthritis of right hip; s/p R NE dos: 19  · Treatment Diagnosis: R26.2 difficulty walking; R53.1 weakness  Insurance/Certification information:  PT Insurance Information: Anthem Medicare - orthonet auth  Physician Information:  Referring Practitioner: Dr. Katiuska Patino of care signed (Y/N):     Date of Patient follow up with Physician:     Progress Note: [x]  Yes  []  No  Next due by: Visit #10       Latex Allergy:  [x]NO      []YES  Preferred Language for Healthcare:   [x]English       []other:    Visit # Insurance Allowable Date Range   1 orthonet auth ? ??     Pain level:  0-3/10     SUBJECTIVE:  See eval    OBJECTIVE: See eval      RESTRICTIONS/PRECAUTIONS: FALL RISK, s/p R NE dos: 19    Exercises/Interventions:     Therapeutic Exercises  Resistance / level Sets/sec Reps Notes   Seated gastroc stretch  30\" 3    Seated HS stretch  30\" 3           Hip add sets PS 10\" 10    glute sets  10\" 10    Quad sets  10\" 10           LAQ 3# 3 10    Standing HS curls  3 10    SLR - flexion npv             Neuromuscular Re-ed / Therapeutic Activities       Gait training  10'  Cane fitted properly, discussed use of cane in L UE, pt.  Able to ambulate, working on even ground walking, turning and transitional movements                  Manual Intervention       Knee mobs/PROM       Tib/Fem Mobs       Patella Mobs       Ankle mobs                         Pt. Education:  -pt educated on diagnosis, prognosis and expectations for rehab  -pt provided with written and illustrated instructions for HEP  -all pt questions were answered    Therapeutic Exercise and NMR EXR  [x] (58129) Provided verbal/tactile cueing for activities related to strengthening, flexibility, endurance, ROM for improvements in LE, proximal hip, and core control with self care, mobility, lifting, ambulation.  [] (49816) Provided verbal/tactile cueing for activities related to improving balance, coordination, kinesthetic sense, posture, motor skill, proprioception  to assist with LE, proximal hip, and core control in self care, mobility, lifting, ambulation and eccentric single leg control. NMR and Therapeutic Activities:    [x] (40522 or 83151) Provided verbal/tactile cueing for activities related to improving balance, coordination, kinesthetic sense, posture, motor skill, proprioception and motor activation to allow for proper function of core, proximal hip and LE with self care and ADLs  [x] (21835) Gait Re-education- Provided training and instruction to the patient for proper LE, core and proximal hip recruitment and positioning and eccentric body weight control with ambulation re-education including up and down stairs     Home Exercise Program:    [x] (24829) Reviewed/Progressed HEP activities related to strengthening, flexibility, endurance, ROM of core, proximal hip and LE for functional self-care, mobility, lifting and ambulation/stair navigation   [] (44479)Reviewed/Progressed HEP activities related to improving balance, coordination, kinesthetic sense, posture, motor skill, proprioception of core, proximal hip and LE for self care, mobility, lifting, and ambulation/stair navigation      Manual Treatments:  PROM / STM / Oscillations-Mobs:  G-I, II, III, IV (PA's, Inf., Post.)  [] (26627) Provided manual therapy to mobilize LE, proximal hip and/or LS spine soft tissue/joints for the purpose of modulating pain, promoting relaxation,  increasing ROM, reducing/eliminating soft tissue swelling/inflammation/restriction, improving soft tissue extensibility and allowing for proper ROM for normal function with self care, mobility, lifting and ambulation.      Modalities:  [] (01276) Vasopneumatic compression: Utilized vasopneumatic compression to decrease edema / swelling for the purpose of improving mobility and quad tone / recruitment which will allow for increased overall function including but not limited to self-care, transfers, ambulation, and ascending / descending stairs. Modalities:      Charges:  Timed Code Treatment Minutes: 32   Total Treatment Minutes: 55     [] EVAL - LOW (89243)   [] EVAL - MOD (58346)  [] EVAL - HIGH (57189)  [x] RE-EVAL (67092)  [x] LF(17933) x      [] Ionto  [] NMR (38432) x      [] Vaso  [] Manual (36057) x      [] Ultrasound  [] TA x       [] Mech Traction (38779)  [] Aquatic Therapy x     [] ES (un) (30543):   [] Home Management Training x [] ES(attended) (51122)   [] Group:     [x] Other: gait     GOALS:  Patient stated goal: \"To be normal again: to be active and be able to travel to [place]    Therapist goals for Patient:   Short Term Goals: To be achieved in: 2 weeks  1. Independent in HEP and progression per patient tolerance, in order to prevent re-injury. 2. Patient will have a decrease in pain to facilitate improvement in movement, function, and ADLs as indicated by Functional Deficits. Long Term Goals: To be achieved in: 10-12 weeks  1. Disability index score of 40% or less for the LEFS to assist with reaching prior level of function. 2. Patient will demonstrate increased AROM to R hip WFL to allow for proper joint functioning as indicated by patients Functional Deficits. 3. Patient will demonstrate an increase in Strength to at least 4/5 as well as good proximal hip strength and control to allow for proper functional mobility as indicated by patients Functional Deficits. 4. Patient will return to functional activities including walking without AD and with minimal deviations without increased symptoms or restriction.    5. Pt will be able to tolerate housework and be able to travel without problems. (patient specific functional

## 2019-06-06 NOTE — PLAN OF CARE
Sukh, [address] [facility] [place], [address] [place] [address]  Phone: [phone]   Fax: [phone]                                                       Physical Therapy Certification    Dear Referring Practitioner: Dr. Toby Jackson,    We had the pleasure of evaluating the following patient for physical therapy services at [address] [place] [address]. A summary of our findings can be found in the initial assessment below. This includes our plan of care. If you have any questions or concerns regarding these findings, please do not hesitate to contact me at the office phone number checked above. Thank you for the referral.       Physician Signature:_______________________________Date:__________________  By signing above (or electronic signature), therapists plan is approved by physician      Patient: Vielka Carpenter   : 1934   MRN: [id number]  Referring Physician: Referring Practitioner: Dr. Toby Jackson      Evaluation Date: 2019      Medical Diagnosis Information:  Diagnosis: M16.11 (ICD-10-CM) - Arthritis of right hip; s/p R NE dos: 19   Treatment Diagnosis: R26.2 difficulty walking; R53.1 weakness                                         Insurance information: PT Insurance Information: Anthem Medicare - orthonet auth     Precautions/ Contra-indications: fall risk  Latex Allergy:  ?NO      ? YES  Preferred Language for Healthcare:   ?English       ? other:    SUBJECTIVE: Patient stated complaint: R NE 19; pt. Was in the hospital for a couple of days and then was transferred to a SNF for rehab where she stayed for 3 wks; Pt. Was d/c home ; pt. Had DVT about 2 wks following surgery and has since followed up with PCP and is on blood thinners; pt. Was cleared for therapy yesterday by PCP; pt. Notes that she has been doing some marching and LAQ and isometrics at home; pt. Is extremely fearful of falling and notes that this limits her function    SUBJECTIVE FROM PRE-HAB 4/25: R hip has been hurting since 2013 but pt. Was able to do exercises to help with bursitis; pt. States that last year she had an injection with relief, she had a second one without relief; pt. Notes that over the past 6-8wks pt. Has been in excruciating pain; 2 wks previous she had to call 911 to go to the ER because of the pain; pt. Notes that she feels best when laying; pain medication does not help the pain; pt. States that she is extremely fearful of falling      Relevant Medical History: L NE 2008, OA, seizures  Functional Disability Index:  LEFS 71% limitation    Pain Scale: 0-3/10  Easing factors: tylenol  Provocative factors: walking, steps    Type: ?Constant    X ? Intermittent  ? Radiating  X ? Localized  ? other:     Numbness/Tingling: denies    Occupation/School: retired    Living Status: pt. Lives alone in a single story home with about 4 steps to get into the home    Prior Level of Function:Prior to this injury / incident, pt was independent with ADLs and IADLs, being independent, working in yard        OBJECTIVE:   Palpation: no point tenderness noted    Quad:  Fair, difficulty isolating contraction    Functional Mobility/Transfers: independent but requires increased time for management of R LE    Posture: Fair    Bandages/Dressings/Incisions: Incision is healed well with no excessive redness or signs of infection    Observation: mild effusion globally R hip    Gait: (include devices/WB status) WBAT with straight cane; Cane-Educated about switching from R to L hand to off weight R post-surgical LE. Antalgic gait with decreased sharona.  Slight hip hike; decreased stance time on R      Dermatomes Normal Abnormal Comments   inguinal area (L1)  x     anterior mid-thigh (L2) x     distal ant thigh/med knee (L3) x     medial lower leg and foot (L4) x     lateral lower leg and foot (L5) x     posterior calf (S1) x     medial calcaneus (S2) x          PROM  AROM    L R L R   Knee Flexion  NT NT   Knee Extension NT -2 NT NT       Strength (0-5) NT due to recent surgery Left Right   Hip Flexion - supine NT NT   Hip Flexion - seated NT NT   Hip Abduction - sidelyling NT NT   Hip Adduction NT NT   Hip Extension NT NT   Quads NT NT   Hamstrings NT NT                Functional testing Pre hab        Date 4/25/19 Re eval post op   Date 6/6/19 4 week f/u   Date  8 week f/u  Date  D/c    TUG 2:36 36\"      30 second sit to stand 0 9 with UE support      6 minute walk 31m NT              Balance (s)  NT      Narrow RODNEY 10       Semi tandem 10       Tandem  0       SLS 0               Knee AROM        Knee flexion 110 126      Knee extension -2 -2              Strength (0-5)  NT      Knee Extension 15.8       Hip aBduction  NT               LEFS 3/80 23/80                             ? Patient history, allergies, meds reviewed. Medical chart reviewed. See intake form. Review Of Systems (ROS):  ?Performed Review of systems (Integumentary, CardioPulmonary, Neurological) by intake and observation. Intake form has been scanned into medical record. Patient has been instructed to contact their primary care physician regarding ROS issues if not already being addressed at this time. Co-morbidities/Complexities (which will affect course of rehabilitation):   ? None           Arthritic conditions   ? Rheumatoid arthritis (M05.9)  X ? Osteoarthritis (M19.91)   Cardiovascular conditions   ? Hypertension (I10)  ? Hyperlipidemia (E78.5)  ? Angina pectoris (I20)  ? Atherosclerosis (I70)   Musculoskeletal conditions   ? Disc pathology   ? Congenital spine pathologies   X ? Prior surgical intervention  ? Osteoporosis (M81.8)  ? Osteopenia (M85.8)   Endocrine conditions   ? Hypothyroid (E03.9)  ? Hyperthyroid Gastrointestinal conditions   ? Constipation (S13.77)   Metabolic conditions   ? Morbid obesity (E66.01)  ? Diabetes type 1(E10.65) or 2 (E11.65)   ? Neuropathy (G60.9)     Pulmonary conditions   ? Asthma (J45)  ? Coughing   ? COPD (J44.9)   Psychological Disorders  ? Anxiety (F41.9)  ? Depression (F32.9)   ? Other:   ?Other:          Barriers to/and or personal factors that will affect rehab potential:              ?Age  ? Sex    ? Smoker              ? Motivation/Lack of Motivation                        X ?Co-Morbidities              ? Cognitive Function, education/learning barriers              X ?Environmental, home barriers              ? profession/work barriers  ? past PT/medical experience  ? other:  Justification:     Falls Risk Assessment (30 days):   ? Falls Risk assessed and no intervention required. X ? Falls Risk assessed and Patient requires intervention due to being higher risk   TUG score (>12s at risk):  36s   X ? Falls education provided, including correct use of AD, slow movements, \"finding balance\" after transitional movement prior to walking       G-Codes:       ASSESSMENT: Pt's pain is well managed and she has good ROM at the start of post operative outpatient therapy. Pt has been doing HEP given after surgery frequently to help promote the increase of ROM, strength, and function post operatively. Functional Impairments:     ? Noted lumbar/proximal hip/LE joint hypomobility   X ? Decreased LE functional ROM   X ? Decreased core/proximal hip strength and neuromuscular control   X ? Decreased LE functional strength   X ? Reduced balance/proprioceptive control   ?other:      Functional Activity Limitations (from functional questionnaire and intake)   X ? Reduced ability to tolerate prolonged functional positions   X ? Reduced ability or difficulty with changes of positions or transfers between positions   X ? Reduced ability to maintain good posture and demonstrate good body mechanics with sitting, bending, and lifting   ? Reduced ability to sleep   X ? Reduced ability or tolerance with driving and/or computer work   X ? Reduced ability to perform presentation with:  ? stable and/or uncomplicated characteristics   X ? evolving clinical presentation with changing characteristics  ? unstable and unpredictable characteristics;   ? Clinical decision making of ? low, X ? moderate, ? high complexity using standardized patient assessment instrument and/or measurable assessment of functional outcome. ? EVAL (LOW) 21198 (typically 30 minutes face-to-face)  ? EVAL (MOD) 92913 (typically 30 minutes face-to-face)  ? EVAL (HIGH) 77763 (typically 45 minutes face-to-face)  X ? RE-EVAL     PLAN:   Frequency/Duration:  2 days per week for 10-12 Week:   Interventions:  ?  Therapeutic exercise including: strength training, ROM, for Lower extremity and core   ? NMR activation and proprioception for LE, Glutes and Core   ? Manual therapy as indicated for LE, Hip and spine to include: Dry Needling/IASTM, STM, PROM, Gr I-IV mobilizations, manipulation. ? Modalities as needed that may include: thermal agents, E-stim, Biofeedback, US, iontophoresis as indicated  ? Patient education on joint protection, postural re-education, activity modification, progression of HEP. HEP instruction: Pt. Provided with written and illustrated instructions for home program. Pt to perform exercises 1-2x day f/b ice 15 min. (see scanned forms)      GOALS:  Patient stated goal: \"To be normal again: to be active and be able to travel to [place]    Therapist goals for Patient:   Short Term Goals: To be achieved in: 2 weeks  1. Independent in HEP and progression per patient tolerance, in order to prevent re-injury. 2. Patient will have a decrease in pain to facilitate improvement in movement, function, and ADLs as indicated by Functional Deficits. Long Term Goals: To be achieved in: 10-12 weeks  1. Disability index score of 40% or less for the LEFS to assist with reaching prior level of function.    2. Patient will demonstrate increased AROM to R hip WFL to allow for proper joint functioning as indicated by patients Functional Deficits. 3. Patient will demonstrate an increase in Strength to at least 4/5 as well as good proximal hip strength and control to allow for proper functional mobility as indicated by patients Functional Deficits. 4. Patient will return to functional activities including walking without AD and with minimal deviations without increased symptoms or restriction.    5. Pt will be able to tolerate housework and be able to travel without problems. (patient specific functional goal)      Electronically signed by:  Daryl Tariq PT

## 2019-06-10 ENCOUNTER — HOSPITAL ENCOUNTER (OUTPATIENT)
Dept: PHYSICAL THERAPY | Age: 84
Setting detail: THERAPIES SERIES
Discharge: HOME | End: 2019-06-10
Payer: MEDICARE

## 2019-06-10 PROCEDURE — 97110 THERAPEUTIC EXERCISES: CPT

## 2019-06-12 ENCOUNTER — HOSPITAL ENCOUNTER (OUTPATIENT)
Dept: PHYSICAL THERAPY | Age: 84
Setting detail: THERAPIES SERIES
Discharge: HOME | End: 2019-06-12
Payer: MEDICARE

## 2019-06-12 PROCEDURE — 97110 THERAPEUTIC EXERCISES: CPT

## 2019-06-12 NOTE — FLOWSHEET NOTE
[facility]    Physical Therapy Daily Treatment Note  Date:  2019    Patient Name:  Dasia Bueno    :  1934  MRN: [id number]  Medical/Treatment Diagnosis Information:  · Diagnosis: M16.11 (ICD-10-CM) - Arthritis of right hip; s/p R NE dos: 19  · Treatment Diagnosis: R26.2 difficulty walking; R53.1 weakness  Insurance/Certification information:  PT Insurance Information: Anthem Medicare - orthonet auth  Physician Information:  Referring Practitioner: Dr. Augusto Syed of care signed (Y/N):     Date of Patient follow up with Physician:     Progress Note: [x]  Yes  []  No  Next due by: Visit #10       Latex Allergy:  [x]NO      []YES  Preferred Language for Healthcare:   [x]English       []other:    Visit # Insurance Allowable Date Range   3 orthonet auth  8 visits 19-19     Pain level:  0-3/10     SUBJECTIVE:  Pt concerned that she is not using 2 canes for ambulation \"because that's what they do in [place]\". Pt denies increased soreness with upgrades from last session. OBJECTIVE: See eval      RESTRICTIONS/PRECAUTIONS: FALL RISK, s/p R NE dos: 19    Exercises/Interventions:     Therapeutic Exercises  Resistance / level Sets/sec Reps Notes   Seated gastroc stretch  30\" 3    Seated HS stretch  30\" 3 Cueing needed for proper technique. Hip add sets PS 10\" 10    glute sets  10\" 10    Quad sets  10\" 10 Cueing to limit glute activation and focus on quad activation          LAQ 3# 3 10    Standing HS curls  3 10    SLR - flexion    Attempted 6/10 but unable   SAQ  2 10 Added 6/10   Standing hip abduction  3 10 Added 6/10   Standing HR/TR  3 10 Added 6/10                 Neuromuscular Re-ed / Therapeutic Activities         10'  Cane fitted properly, discussed use of cane in L UE, pt.  Able to ambulate, working on even ground walking, turning and transitional movements                  Manual Intervention Provided manual therapy to mobilize LE, proximal hip and/or LS spine soft tissue/joints for the purpose of modulating pain, promoting relaxation,  increasing ROM, reducing/eliminating soft tissue swelling/inflammation/restriction, improving soft tissue extensibility and allowing for proper ROM for normal function with self care, mobility, lifting and ambulation. Modalities:  [] (78426) Vasopneumatic compression: Utilized vasopneumatic compression to decrease edema / swelling for the purpose of improving mobility and quad tone / recruitment which will allow for increased overall function including but not limited to self-care, transfers, ambulation, and ascending / descending stairs. Modalities:      Charges:  Timed Code Treatment Minutes: 40   Total Treatment Minutes: 50     [] EVAL - LOW (90785)   [] EVAL - MOD (23677)  [] EVAL - HIGH (27239)  [] RE-EVAL (14077)  [x] ML(84003) x 3     [] Ionto  [] NMR (64741) x      [] Vaso  [] Manual (39489) x      [] Ultrasound  [] TA x       [] Mech Traction (94418)  [] Aquatic Therapy x     [] ES (un) (31663):   [] Home Management Training x [] ES(attended) (47517)   [] Group:     [] Other: gait     GOALS:  Patient stated goal: \"To be normal again: to be active and be able to travel to [employer]     Therapist goals for Patient:   Short Term Goals: To be achieved in: 2 weeks  1. Independent in HEP and progression per patient tolerance, in order to prevent re-injury. 2. Patient will have a decrease in pain to facilitate improvement in movement, function, and ADLs as indicated by Functional Deficits. Long Term Goals: To be achieved in: 10-12 weeks  1. Disability index score of 40% or less for the LEFS to assist with reaching prior level of function. 2. Patient will demonstrate increased AROM to R hip WFL to allow for proper joint functioning as indicated by patients Functional Deficits.    3. Patient will demonstrate an increase in Strength to at least 4/5 as well as good proximal hip strength and control to allow for proper functional mobility as indicated by patients Functional Deficits. 4. Patient will return to functional activities including walking without AD and with minimal deviations without increased symptoms or restriction. 5. Pt will be able to tolerate housework and be able to travel without problems. (patient specific functional goal)      Progression Towards Functional goals:  [] Patient is progressing as expected towards functional goals listed. [] Progression is slowed due to complexities listed. [] Progression has been slowed due to co-morbidities. [x] Plan just implemented, too soon to assess goals progression  [] Other:     Persisting Functional Limitations/Impairments:  []Sitting [x]Standing   [x]Walking [x]Stairs   [x]Transfers [x]ADLs   [x]Squatting/bending [x]Kneeling  [x]Housework []Job related tasks  [x]Driving [x]Sports/Recreation   []Sleeping []Other:    ASSESSMENT:  Added exercises to HEP this date including standing HR, SAQ, and standing hip abduction. Attempted standing on R LE to perform hip abduction with L LE but weakness prevents standing on R LE at this time. Continue to upgrade exercises as tolerated to improve strength and functional ambulation as well as improved balance for independent and safe gait.      Treatment/Activity Tolerance:  [x] Patient tolerated treatment well [] Patient limited by fatique  [] Patient limited by pain  [] Patient limited by other medical complications  [] Other:     Prognosis:  [x] Good [x] Fair  [] Poor    Patient Requires Follow-up: [x] Yes  [] No    Return to Play:    [x]  N/A   []  Stage 1: Intro to Strength   []  Stage 2: Return to Run and Strength   []  Stage 3: Return to Jump and Strength   []  Stage 4: Dynamic Strength and Agility   []  Stage 5: Sport Specific Training   []  Ready to Return to Play, Meets All Above Stages   []  Not Ready for Return to Sports   Comments: PLAN: See eval. PT 2x / week for 10-12 weeks.    [] Continue per plan of care [] Alter current plan (see comments)  [x] Plan of care initiated [] Hold pending MD visit [] Discharge    Electronically signed by: Franky [id number]

## 2019-06-17 ENCOUNTER — HOSPITAL ENCOUNTER (OUTPATIENT)
Dept: PHYSICAL THERAPY | Age: 84
Setting detail: THERAPIES SERIES
Discharge: HOME | End: 2019-06-17
Payer: MEDICARE

## 2019-06-17 ENCOUNTER — OFFICE VISIT (OUTPATIENT)
Dept: ORTHOPEDIC SURGERY | Age: 84
End: 2019-06-17

## 2019-06-17 VITALS
BODY MASS INDEX: 20.89 KG/M2 | WEIGHT: 130 LBS | SYSTOLIC BLOOD PRESSURE: 138 MMHG | HEART RATE: 75 BPM | HEIGHT: 66 IN | DIASTOLIC BLOOD PRESSURE: 68 MMHG

## 2019-06-17 DIAGNOSIS — Z96.641 HISTORY OF TOTAL RIGHT HIP REPLACEMENT: Primary | ICD-10-CM

## 2019-06-17 PROCEDURE — 97110 THERAPEUTIC EXERCISES: CPT

## 2019-06-17 PROCEDURE — 97112 NEUROMUSCULAR REEDUCATION: CPT

## 2019-06-17 PROCEDURE — 99024 POSTOP FOLLOW-UP VISIT: CPT | Performed by: ORTHOPAEDIC SURGERY

## 2019-06-17 NOTE — PROGRESS NOTES
Patient returns with his daughter today. She 6 weeks status post right total hip arthroplasty. She had postop comp patient getting a DVT 2 weeks after surgery. She says her hip is feeling good. She started to walk without a cane. ROS: Pertinent items are noted in HPI. No notes on file    Past Medical History:  No date: Arthritis  04/22/2019: Edema      Comment:  lower legs and feet  No date: Hypercholesteremia  No date: Hypertension  No date: Seizure [facility])      Comment:  had few episodes of probable nocturnal seizures (7/2015,               8/2018, 3/2019), negative eeg and MRI     Past Surgical History:  No date: APPENDECTOMY  No date: COLONOSCOPY  No date: JOINT REPLACEMENT      Comment:  left  12/11/2017: SALPINGO-OOPHORECTOMY      Comment:  robotic, and hysteroscopy d&c, polypectomy   5/1/2019: TOTAL HIP ARTHROPLASTY; Right      Comment:  RIGHT HIP TOTAL ARTHROPLASTY performed by Jensen Sher MD at [address] [place] [address] reviewed. No pertinent family history. Social History    Socioeconomic History      Marital status:        Spouse name: None      Number of children: None      Years of education: None      Highest education level: None    Occupational History      None    Social Needs      Financial resource strain: None      Food insecurity:        Worry: None        Inability: None      Transportation needs:        Medical: None        Non-medical: None    Tobacco Use      Smoking status: Never Smoker      Smokeless tobacco: Never Used    Substance and Sexual Activity      Alcohol use: No      Drug use: No      Sexual activity: None    Lifestyle      Physical activity:        Days per week: None        Minutes per session: None      Stress: None    Relationships      Social connections:        Talks on phone: None        Gets together: None        Attends Sikhism service: None        Active member of club or organization: None        Attends meetings of clubs or organizations: None        Relationship status: None      Intimate partner violence:        Fear of current or ex partner: None        Emotionally abused: None        Physically abused: None        Forced sexual activity: None    Other Topics      Concerns:        None    Social History Narrative      None      Current Outpatient Medications:  rivaroxaban (XARELTO) 20 MG TABS tablet, Take 1 tablet by mouth daily (with breakfast), Disp: 30 tablet, Rfl: 5  aspirin EC 81 MG EC tablet, Take 1 tablet by mouth daily, Disp: 30 tablet, Rfl: 0  docusate sodium (COLACE, DULCOLAX) 100 MG CAPS, Take 100 mg by mouth 2 times daily, Disp: 60 capsule, Rfl: 0  lisinopril (PRINIVIL;ZESTRIL) 20 MG tablet, Take 20 mg by mouth nightly, Disp: , Rfl:   levETIRAcetam (KEPPRA) 250 MG tablet, Take 250 mg by mouth One tab in a.m. And 2 tabs at bedtime, Disp: , Rfl:   simvastatin (ZOCOR) 10 MG tablet, Take 1 tablet by mouth nightly, Disp: 30 tablet, Rfl: 5  Cholecalciferol (VITAMIN D3) 1000 units TABS, Take by mouth nightly , Disp: , Rfl:   Magnesium Hydroxide (MAGNESIA PO), Take 500 mg by mouth nightly , Disp: , Rfl:   rivaroxaban (XARELTO) 15 MG TABS tablet, Take 1 tablet by mouth 2 times daily (with meals) for 21 days, Disp: 42 tablet, Rfl: 0    No current facility-administered medications for this visit. -- Oxycodone -- Other (See Comments)    --  Depression   -- Norvasc [Amlodipine Besylate] -- Anxiety    VITAL SIGNS:  /68   Pulse 75   Ht 5' 6\" (1.676 m)   Wt 130 lb (59 kg)   BMI 20.98 kg/m²   On examination the incision is healing very nicely. There is soft with no induration or erythema. She has good range of motion today about 100 degrees of hip flexion. At 90 degrees she has about 20 degrees of internal rotation and 45 degrees of external rotation she can do a straight leg raise.   She has some pretibial edema in both the right slightly worse than the left probably secondary to her DVT but she is going to see her primary care soon and she will be evaluated. Is normal knee range of motion. She has no calf tenderness however and a negative Homans today. Impression improving 6 weeks status post right total hip arthroplasty. Plan: Patient will continue in physical therapy. We will see her back in 6 weeks.

## 2019-06-17 NOTE — FLOWSHEET NOTE
[address] [facility]    Physical Therapy Daily Treatment Note  Date:  2019    Patient Name:  Opal Batres    :  1934  MRN: [id number]  Medical/Treatment Diagnosis Information:  · Diagnosis: M16.11 (ICD-10-CM) - Arthritis of right hip; s/p R NE dos: 19  · Treatment Diagnosis: R26.2 difficulty walking; R53.1 weakness  Insurance/Certification information:  PT Insurance Information: Anthem Medicare - orthonet auth  Physician Information:  Referring Practitioner: Dr. Julien Montes of care signed (Y/N):     Date of Patient follow up with Physician:     Progress Note: [x]  Yes  []  No  Next due by: Visit #10       Latex Allergy:  [x]NO      []YES  Preferred Language for Healthcare:   [x]English       []other:    Visit # Insurance Allowable Date Range   4 orthonet auth  8 visits 19-19     Pain level:  0-3/10     SUBJECTIVE: Continues to have terrible fear of falling with ambulation. Hip is stiff today, saw MD prior to PT session this date. OBJECTIVE: See eval      RESTRICTIONS/PRECAUTIONS: FALL RISK, s/p R NE dos: 19    Exercises/Interventions:     Therapeutic Exercises  Resistance / level Sets/sec Reps Notes   Seated gastroc stretch  30\" 3    Seated HS stretch  30\" 3 Cueing needed for proper technique. Hip add sets PS 10\" 10    glute sets  10\" 10    Quad sets  10\" 10 Cueing to limit glute activation and focus on quad activation   Supine hooklying fall outs Green band 1 20 Added    LAQ 3# 3 10    Standing HS curls 3# 3 10 ^   SLR - flexion  1 10 Modified height- added    SAQ 3# 3 10 Added 6/10   ^   Standing hip abduction- bilateral LE, no weight on L LE 3# 3 10 Added 6/10   ^   Standing HR/TR  3 10 Added 10                 Neuromuscular Re-ed / Therapeutic Activities           Cane fitted properly, discussed use of cane in L UE, pt.  Able to ambulate, working on even ground walking, turning and function. 2. Patient will demonstrate increased AROM to R hip WFL to allow for proper joint functioning as indicated by patients Functional Deficits. 3. Patient will demonstrate an increase in Strength to at least 4/5 as well as good proximal hip strength and control to allow for proper functional mobility as indicated by patients Functional Deficits. 4. Patient will return to functional activities including walking without AD and with minimal deviations without increased symptoms or restriction. 5. Pt will be able to tolerate housework and be able to travel without problems. (patient specific functional goal)      Progression Towards Functional goals:  [] Patient is progressing as expected towards functional goals listed. [] Progression is slowed due to complexities listed. [] Progression has been slowed due to co-morbidities. [x] Plan just implemented, too soon to assess goals progression  [] Other:     Persisting Functional Limitations/Impairments:  []Sitting [x]Standing   [x]Walking [x]Stairs   [x]Transfers [x]ADLs   [x]Squatting/bending [x]Kneeling  [x]Housework []Job related tasks  [x]Driving [x]Sports/Recreation   []Sleeping []Other:    ASSESSMENT:  Upgrades to program made this date per bolded activity on above flow sheet. Pt demonstrates significant weakness with SLR but was able to perform with muscle soreness noted. Upgraded program to incorporate balance exercises for improved proprioception and safety with gait. Pt demonstrates high level of fear and anxiety with balance exercises and needs maximum cueing for encouragement throughout routine. During biodex balance, pt noted to have majority of weight on toes with cueing to maintain neutral weight bearing for improved balance. Pt tolerated addition of weights to exercises as noted on flow sheet. Continue to advance exercises as tolerated to work on improving strength and balance for improved gait and balance safety. Treatment/Activity Tolerance:  [x] Patient tolerated treatment well [] Patient limited by fatique  [] Patient limited by pain  [] Patient limited by other medical complications  [] Other:     Prognosis:  [x] Good [x] Fair  [] Poor    Patient Requires Follow-up: [x] Yes  [] No    Return to Play:    [x]  N/A   []  Stage 1: Intro to Strength   []  Stage 2: Return to Run and Strength   []  Stage 3: Return to Jump and Strength   []  Stage 4: Dynamic Strength and Agility   []  Stage 5: Sport Specific Training   []  Ready to Return to Play, Meets All Above Stages   []  Not Ready for Return to Sports   Comments:            PLAN: See eval. PT 2x / week for 10-12 weeks.    [] Continue per plan of care [] Alter current plan (see comments)  [x] Plan of care initiated [] Hold pending MD visit [] Discharge    Electronically signed by: James Coffman [id number]

## 2019-06-19 ENCOUNTER — HOSPITAL ENCOUNTER (OUTPATIENT)
Dept: PHYSICAL THERAPY | Age: 84
Setting detail: THERAPIES SERIES
Discharge: HOME | End: 2019-06-19
Payer: MEDICARE

## 2019-06-19 PROCEDURE — 97112 NEUROMUSCULAR REEDUCATION: CPT | Performed by: PHYSICAL THERAPIST

## 2019-06-19 PROCEDURE — 97110 THERAPEUTIC EXERCISES: CPT | Performed by: PHYSICAL THERAPIST

## 2019-06-19 NOTE — FLOWSHEET NOTE
[address] [facility]    Physical Therapy Daily Treatment Note  Date:  2019    Patient Name:  Butch Lindquist    :  1934  MRN: [id number]  Medical/Treatment Diagnosis Information:  · Diagnosis: M16.11 (ICD-10-CM) - Arthritis of right hip; s/p R NE dos: 19  · Treatment Diagnosis: R26.2 difficulty walking; R53.1 weakness  Insurance/Certification information:  PT Insurance Information: Anthem Medicare - orthonet auth  Physician Information:  Referring Practitioner: Dr. Shalom Mcdonald of care signed (Y/N): Y    Date of Patient follow up with Physician:     Progress Note: []  Yes  [x]  No  Next due by: Visit #10       Latex Allergy:  [x]NO      []YES  Preferred Language for Healthcare:   [x]English       []other:    Visit # Insurance Allowable Date Range   5 orthonet auth  8 visits 19-19     Pain level:  0/10     SUBJECTIVE: Pt. Denies pain in her hip at this time. Pt. Continues to be very fearful of falling and notes difficulty with balance activities. Pt. Notes that she continues to have difficulty with stairs and with dressing. OBJECTIVE: See eval      RESTRICTIONS/PRECAUTIONS: FALL RISK, s/p R NE dos: 19    Exercises/Interventions:     Therapeutic Exercises  Resistance / level Sets/sec Reps Notes   Incline board calf stretch  30\" 3 ^   Seated HS stretch  30\" 3 Cueing needed for proper technique.            Hip add sets PS 10\" 10    glute sets  10\" 10    Quad sets  10\" 10 Cueing to limit glute activation and focus on quad activation   Supine hooklying clams Green band 2 10 Added    LAQ 3# 3 10    Standing HS curls 3# 3 10 ^   SLR - flexion  0 5 619 manual A for concentric, independent eccentric   Added 6/10   ^   Standing hip abduction 3# R 0# L 3 10 Added 6/10   ^   Standing HR/TR  3 10 Added 6/10                 Neuromuscular Re-ed / Therapeutic Activities           Cane fitted properly, discussed use of cane in L UE, pt. Able to ambulate, working on even ground walking, turning and transitional movements    bridges  1 15 Added 6/17   Single leg balance Hand hold assist 5\" 5 Added 6/17   Tandem balance  30\" 2 Added 6/17   biodex balance PS L 12 1'  Added 6/17   Step up L1  CGA 1 10 Start 6/19          Manual Intervention       Knee mobs/PROM       Tib/Fem Mobs       Patella Mobs       Ankle mobs                         Pt. Education:  -reviewed HEP  -reviewed importance of challenging balance in controlled environments in order to decrease fall risk    Therapeutic Exercise and NMR EXR  [x] (21668) Provided verbal/tactile cueing for activities related to strengthening, flexibility, endurance, ROM for improvements in LE, proximal hip, and core control with self care, mobility, lifting, ambulation.  [] (89191) Provided verbal/tactile cueing for activities related to improving balance, coordination, kinesthetic sense, posture, motor skill, proprioception  to assist with LE, proximal hip, and core control in self care, mobility, lifting, ambulation and eccentric single leg control.      NMR and Therapeutic Activities:    [x] (91279 or 37357) Provided verbal/tactile cueing for activities related to improving balance, coordination, kinesthetic sense, posture, motor skill, proprioception and motor activation to allow for proper function of core, proximal hip and LE with self care and ADLs  [x] (65372) Gait Re-education- Provided training and instruction to the patient for proper LE, core and proximal hip recruitment and positioning and eccentric body weight control with ambulation re-education including up and down stairs     Home Exercise Program:    [x] (07943) Reviewed/Progressed HEP activities related to strengthening, flexibility, endurance, ROM of core, proximal hip and LE for functional self-care, mobility, lifting and ambulation/stair navigation   [] (16395)Reviewed/Progressed HEP activities related to improving balance, coordination, kinesthetic sense, posture, motor skill, proprioception of core, proximal hip and LE for self care, mobility, lifting, and ambulation/stair navigation      Manual Treatments:  PROM / STM / Oscillations-Mobs:  G-I, II, III, IV (PA's, Inf., Post.)  [] (41595) Provided manual therapy to mobilize LE, proximal hip and/or LS spine soft tissue/joints for the purpose of modulating pain, promoting relaxation,  increasing ROM, reducing/eliminating soft tissue swelling/inflammation/restriction, improving soft tissue extensibility and allowing for proper ROM for normal function with self care, mobility, lifting and ambulation. Modalities:  [] (93351) Vasopneumatic compression: Utilized vasopneumatic compression to decrease edema / swelling for the purpose of improving mobility and quad tone / recruitment which will allow for increased overall function including but not limited to self-care, transfers, ambulation, and ascending / descending stairs. Modalities:      Charges:  Timed Code Treatment Minutes: 48   Total Treatment Minutes: 52     [] EVAL - LOW (73865)   [] EVAL - MOD (16677)  [] EVAL - HIGH (39063)  [] RE-EVAL (19037)  [x] FI(06638) x 2     [] Ionto  [x] NMR (87276) x 1     [] Vaso  [] Manual (26046) x      [] Ultrasound  [] TA x       [] Mech Traction (60459)  [] Aquatic Therapy x     [] ES (un) (80402):   [] Home Management Training x [] ES(attended) (68824)   [] Group:     [] Other: gait     GOALS:  Patient stated goal: \"To be normal again: to be active and be able to travel to [employer]     Therapist goals for Patient:   Short Term Goals: To be achieved in: 2 weeks  1. Independent in HEP and progression per patient tolerance, in order to prevent re-injury. 2. Patient will have a decrease in pain to facilitate improvement in movement, function, and ADLs as indicated by Functional Deficits. Long Term Goals: To be achieved in: 10-12 weeks  1.  Disability index score of 40% or less for the LEFS to assist with reaching prior level of function. 2. Patient will demonstrate increased AROM to R hip WFL to allow for proper joint functioning as indicated by patients Functional Deficits. 3. Patient will demonstrate an increase in Strength to at least 4/5 as well as good proximal hip strength and control to allow for proper functional mobility as indicated by patients Functional Deficits. 4. Patient will return to functional activities including walking without AD and with minimal deviations without increased symptoms or restriction. 5. Pt will be able to tolerate housework and be able to travel without problems. (patient specific functional goal)      Progression Towards Functional goals:  [] Patient is progressing as expected towards functional goals listed. [] Progression is slowed due to complexities listed. [] Progression has been slowed due to co-morbidities. [x] Plan just implemented, too soon to assess goals progression  [] Other:     Persisting Functional Limitations/Impairments:  []Sitting [x]Standing   [x]Walking [x]Stairs   [x]Transfers [x]ADLs   [x]Squatting/bending [x]Kneeling  [x]Housework []Job related tasks  [x]Driving [x]Sports/Recreation   []Sleeping []Other:    ASSESSMENT:      Treatment/Activity Tolerance:  [x] Patient tolerated treatment well [] Patient limited by fatique  [] Patient limited by pain  [] Patient limited by other medical complications  [x] Other: Pt. Tolerated therapy today with minimal complaints. Pt. Continues to have significant fear of falling limiting functional movements. Pt. Requires assist for SLR due to deficits in strength. Strength deficits continue to limit transitional movements. Pt. Requires UE support for balance activities and has noted lack of typical balance strategies. Initiated step up with pt. Requiring CGA due to strength and balance deficits. Cueing required throughout for correct technique. Discussed with pt.  Importance of continued use of cane for ambulation. Pt. Requires continued progression of post-op protocol in order to restore LE strength and to improve gait and balance. Prognosis:  [x] Good [x] Fair  [] Poor    Patient Requires Follow-up: [x] Yes  [] No    Return to Play:    [x]  N/A   []  Stage 1: Intro to Strength   []  Stage 2: Return to Run and Strength   []  Stage 3: Return to Jump and Strength   []  Stage 4: Dynamic Strength and Agility   []  Stage 5: Sport Specific Training   []  Ready to Return to Play, Meets All Above Stages   []  Not Ready for Return to Sports   Comments:            PLAN: See eval. PT 2x / week for 10-12 weeks.    [x] Continue per plan of care [] Alter current plan (see comments)  [] Plan of care initiated [] Hold pending MD visit [] Discharge    Electronically signed by: Kina Valentine PT   Assisted by Aster Mohr PTA

## 2019-06-25 ENCOUNTER — HOSPITAL ENCOUNTER (OUTPATIENT)
Dept: PHYSICAL THERAPY | Age: 84
Setting detail: THERAPIES SERIES
Discharge: HOME | End: 2019-06-25
Payer: MEDICARE

## 2019-06-25 PROCEDURE — 97112 NEUROMUSCULAR REEDUCATION: CPT | Performed by: PHYSICAL THERAPIST

## 2019-06-25 PROCEDURE — 97110 THERAPEUTIC EXERCISES: CPT | Performed by: PHYSICAL THERAPIST

## 2019-06-25 NOTE — FLOWSHEET NOTE
[address] [facility] [address]    Physical Therapy Daily Treatment Note  Date:  2019    Patient Name:  Liliana Toledo    :  1934  MRN: [id number]  Medical/Treatment Diagnosis Information:  · Diagnosis: M16.11 (ICD-10-CM) - Arthritis of right hip; s/p R NE dos: 19  · Treatment Diagnosis: R26.2 difficulty walking; R53.1 weakness  Insurance/Certification information:  PT Insurance Information: Anthem Medicare - orthonet auth  Physician Information:  Referring Practitioner: Dr. Venkat Walls of care signed (Y/N): Y    Date of Patient follow up with Physician:     Progress Note: []  Yes  [x]  No  Next due by: Visit #10       Latex Allergy:  [x]NO      []YES  Preferred Language for Healthcare:   [x]English       []other:    Visit # Insurance Allowable Date Range   6 orthonet auth  8 visits 19-19     Pain level:  0/10     SUBJECTIVE: Pt. Denies pain in her hip at this time. Pt. States that it continues to feel stiff. Pt. With some difficulty with dressing and with stairs. Pt. States that she would like to be able to walk without the cane. OBJECTIVE:     Gait: WBAT  W/ straight cane, even but shortened step length due to fear of falling, mild increased trunk lean      RESTRICTIONS/PRECAUTIONS: FALL RISK, s/p R NE dos: 19    Exercises/Interventions:     Therapeutic Exercises  Resistance / level Sets/sec Reps Notes   Incline board calf stretch  30\" 3 ^   Seated HS stretch  30\" 3 Cueing needed for proper technique.            Hip add sets    HEP   glute sets    HEP   Quad sets    HEP   Supine hooklying clams Green band 2 10 Added    LAQ 4# 3 10 ^   Standing HS curls 4# 3 10 ^ ^   SLR - flexion  1 10 ^   Added 6/10   ^   Standing hip abduction 4# R 2# L 3 10 Added 6/10   ^ ^   Standing HR/TR  3 10 Added 6/10   Sit to stand Lowered table (mid thigh) 3 10 Start                  Neuromuscular Re-ed / Therapeutic Activities           Cane fitted properly, discussed use of cane in L UE, pt. Able to ambulate, working on even ground walking, turning and transitional movements    bridges W/ BS 5\" 15 ^6/25   Single leg balance Hand hold assist 5\" 5 Added 6/17   Tandem balance  30\" 2 Added 6/17   biodex balance PS L 12 1'  Added 6/17   Step up L1  CGA 2 10 ^6/25          Manual Intervention       Knee mobs/PROM       Tib/Fem Mobs       Patella Mobs       Ankle mobs                         Pt. Education:  -reviewed HEP  -reviewed importance of challenging balance in controlled environments in order to decrease fall risk    Therapeutic Exercise and NMR EXR  [x] (59969) Provided verbal/tactile cueing for activities related to strengthening, flexibility, endurance, ROM for improvements in LE, proximal hip, and core control with self care, mobility, lifting, ambulation.  [] (23652) Provided verbal/tactile cueing for activities related to improving balance, coordination, kinesthetic sense, posture, motor skill, proprioception  to assist with LE, proximal hip, and core control in self care, mobility, lifting, ambulation and eccentric single leg control.      NMR and Therapeutic Activities:    [x] (06131 or 98738) Provided verbal/tactile cueing for activities related to improving balance, coordination, kinesthetic sense, posture, motor skill, proprioception and motor activation to allow for proper function of core, proximal hip and LE with self care and ADLs  [x] (78824) Gait Re-education- Provided training and instruction to the patient for proper LE, core and proximal hip recruitment and positioning and eccentric body weight control with ambulation re-education including up and down stairs     Home Exercise Program:    [x] (11337) Reviewed/Progressed HEP activities related to strengthening, flexibility, endurance, ROM of core, proximal hip and LE for functional self-care, mobility, lifting and ambulation/stair navigation   [] (04960)Reviewed/Progressed HEP activities related to improving balance, coordination, kinesthetic sense, posture, motor skill, proprioception of core, proximal hip and LE for self care, mobility, lifting, and ambulation/stair navigation      Manual Treatments:  PROM / STM / Oscillations-Mobs:  G-I, II, III, IV (PA's, Inf., Post.)  [] (47125) Provided manual therapy to mobilize LE, proximal hip and/or LS spine soft tissue/joints for the purpose of modulating pain, promoting relaxation,  increasing ROM, reducing/eliminating soft tissue swelling/inflammation/restriction, improving soft tissue extensibility and allowing for proper ROM for normal function with self care, mobility, lifting and ambulation. Modalities:  [] (23071) Vasopneumatic compression: Utilized vasopneumatic compression to decrease edema / swelling for the purpose of improving mobility and quad tone / recruitment which will allow for increased overall function including but not limited to self-care, transfers, ambulation, and ascending / descending stairs. Modalities:      Charges:  Timed Code Treatment Minutes: 46   Total Treatment Minutes: 51     [] EVAL - LOW (05891)   [] EVAL - MOD (84189)  [] EVAL - HIGH (32787)  [] RE-EVAL (97141)  [x] SP(08937) x 2     [] Ionto  [x] NMR (00166) x 1     [] Vaso  [] Manual (45814) x      [] Ultrasound  [] TA x       [] Mech Traction (08088)  [] Aquatic Therapy x     [] ES (un) (33009):   [] Home Management Training x [] ES(attended) (59557)   [] Group:     [] Other: gait     GOALS:  Patient stated goal: \"To be normal again: to be active and be able to travel to [place]     Therapist goals for Patient:   Short Term Goals: To be achieved in: 2 weeks  1. Independent in HEP and progression per patient tolerance, in order to prevent re-injury. 2. Patient will have a decrease in pain to facilitate improvement in movement, function, and ADLs as indicated by Functional Deficits. Long Term Goals:  To be achieved in: 10-12 weeks  1. Disability index score of 40% or less for the LEFS to assist with reaching prior level of function. 2. Patient will demonstrate increased AROM to R hip WFL to allow for proper joint functioning as indicated by patients Functional Deficits. 3. Patient will demonstrate an increase in Strength to at least 4/5 as well as good proximal hip strength and control to allow for proper functional mobility as indicated by patients Functional Deficits. 4. Patient will return to functional activities including walking without AD and with minimal deviations without increased symptoms or restriction. 5. Pt will be able to tolerate housework and be able to travel without problems. (patient specific functional goal)      Progression Towards Functional goals:  [] Patient is progressing as expected towards functional goals listed. [] Progression is slowed due to complexities listed. [] Progression has been slowed due to co-morbidities. [x] Plan just implemented, too soon to assess goals progression  [] Other:     Persisting Functional Limitations/Impairments:  []Sitting [x]Standing   [x]Walking [x]Stairs   [x]Transfers [x]ADLs   [x]Squatting/bending [x]Kneeling  [x]Housework []Job related tasks  [x]Driving [x]Sports/Recreation   []Sleeping []Other:    ASSESSMENT:      Treatment/Activity Tolerance:  [x] Patient tolerated treatment well [] Patient limited by fatique  [] Patient limited by pain  [] Patient limited by other medical complications  [x] Other: Pt. Tolerated therapy today with minimal complaints. Pt. Continues to require frequent encouragement, especially with new activities in order to complete due to fear and anxiety. Pt. Continues to have fear of falling limiting functional mobility. Discussed with pt. That due to persisting weakness and balance deficits at this time it is recommended that pt. Continue to use cane for safety with ambulation.  Pt. Requires frequent cueing throughout therapy session for correct completion of activities. Pt. Vilma Neither significantly by SLR flexion but able to complete independently this date with increased rest breaks. Initiated sit to stand without issue. Pt. Requires UE support with balance activities, especially with SLS. Pt. Requires continued progression of skilled therapy in order to restore functional strength and decrease fall risk. Prognosis:  [x] Good [x] Fair  [] Poor    Patient Requires Follow-up: [x] Yes  [] No    Return to Play:    [x]  N/A   []  Stage 1: Intro to Strength   []  Stage 2: Return to Run and Strength   []  Stage 3: Return to Jump and Strength   []  Stage 4: Dynamic Strength and Agility   []  Stage 5: Sport Specific Training   []  Ready to Return to Play, Meets All Above Stages   []  Not Ready for Return to Sports   Comments:            PLAN: See eval. PT 2x / week for 10-12 weeks.    [x] Continue per plan of care [] Alter current plan (see comments)  [] Plan of care initiated [] Hold pending MD visit [] Discharge    Electronically signed by: Zion Molina PT

## 2019-06-27 ENCOUNTER — HOSPITAL ENCOUNTER (OUTPATIENT)
Dept: PHYSICAL THERAPY | Age: 84
Setting detail: THERAPIES SERIES
Discharge: HOME | End: 2019-06-27
Payer: MEDICARE

## 2019-06-27 PROCEDURE — 97110 THERAPEUTIC EXERCISES: CPT | Performed by: PHYSICAL THERAPIST

## 2019-06-27 PROCEDURE — 97112 NEUROMUSCULAR REEDUCATION: CPT | Performed by: PHYSICAL THERAPIST

## 2019-06-27 NOTE — FLOWSHEET NOTE
ambulation/stair navigation   [] (89837)Reviewed/Progressed HEP activities related to improving balance, coordination, kinesthetic sense, posture, motor skill, proprioception of core, proximal hip and LE for self care, mobility, lifting, and ambulation/stair navigation      Manual Treatments:  PROM / STM / Oscillations-Mobs:  G-I, II, III, IV (PA's, Inf., Post.)  [] (49252) Provided manual therapy to mobilize LE, proximal hip and/or LS spine soft tissue/joints for the purpose of modulating pain, promoting relaxation,  increasing ROM, reducing/eliminating soft tissue swelling/inflammation/restriction, improving soft tissue extensibility and allowing for proper ROM for normal function with self care, mobility, lifting and ambulation. Modalities:  [] (60014) Vasopneumatic compression: Utilized vasopneumatic compression to decrease edema / swelling for the purpose of improving mobility and quad tone / recruitment which will allow for increased overall function including but not limited to self-care, transfers, ambulation, and ascending / descending stairs. Modalities:      Charges:  Timed Code Treatment Minutes: 43   Total Treatment Minutes: 45     [] EVAL - LOW (39775)   [] EVAL - MOD (16010)  [] EVAL - HIGH (10978)  [] RE-EVAL (18472)  [x] HU(14855) x 2     [] Ionto  [x] NMR (24685) x 1     [] Vaso  [] Manual (50071) x      [] Ultrasound  [] TA x       [] Mech Traction (36928)  [] Aquatic Therapy x     [] ES (un) (00216):   [] Home Management Training x [] ES(attended) (74827)   [] Group:     [] Other: gait     GOALS:  Patient stated goal: \"To be normal again: to be active and be able to travel to [place]     Therapist goals for Patient:   Short Term Goals: To be achieved in: 2 weeks  1. Independent in HEP and progression per patient tolerance, in order to prevent re-injury.    2. Patient will have a decrease in pain to facilitate improvement in movement, function, and ADLs as indicated by Functional Deficits. Long Term Goals: To be achieved in: 10-12 weeks  1. Disability index score of 40% or less for the LEFS to assist with reaching prior level of function. 2. Patient will demonstrate increased AROM to R hip WFL to allow for proper joint functioning as indicated by patients Functional Deficits. 3. Patient will demonstrate an increase in Strength to at least 4/5 as well as good proximal hip strength and control to allow for proper functional mobility as indicated by patients Functional Deficits. 4. Patient will return to functional activities including walking without AD and with minimal deviations without increased symptoms or restriction. 5. Pt will be able to tolerate housework and be able to travel without problems. (patient specific functional goal)      Progression Towards Functional goals:  [] Patient is progressing as expected towards functional goals listed. [] Progression is slowed due to complexities listed. [] Progression has been slowed due to co-morbidities. [x] Plan just implemented, too soon to assess goals progression  [] Other:     Persisting Functional Limitations/Impairments:  []Sitting [x]Standing   [x]Walking [x]Stairs   [x]Transfers [x]ADLs   [x]Squatting/bending [x]Kneeling  [x]Housework []Job related tasks  [x]Driving [x]Sports/Recreation   []Sleeping []Other:    ASSESSMENT:      Treatment/Activity Tolerance:  [x] Patient tolerated treatment well [] Patient limited by fatique  [] Patient limited by pain  [] Patient limited by other medical complications  [x] Other: Pt. Tolerated therapy today with minimal complaints. Pt. Continues to have weakness and severe fear of falling limiting overall function. Unable to complete SL hip abduction due to strength deficits. Pt. Requires cueing throughout therapy session for correct completion of activities. Pt. Requires constant cueing with step up for control due to strength deficits and encouragement due to fear. Pt.  With slight improvement in balance this date demonstrated by decreased UE support with biodex balance and tandem stance. Pt. Continues to require constant UE support with SLS balance due to persisting proprioception/balance deficits. Corrected compensations with hip abduction due to weakness. Pt. Requires continued progression of post-op protocol in order to restore LE functional mobility and strength for normalized gait. Prognosis:  [x] Good [x] Fair  [] Poor    Patient Requires Follow-up: [x] Yes  [] No    Return to Play:    [x]  N/A   []  Stage 1: Intro to Strength   []  Stage 2: Return to Run and Strength   []  Stage 3: Return to Jump and Strength   []  Stage 4: Dynamic Strength and Agility   []  Stage 5: Sport Specific Training   []  Ready to Return to Play, Meets All Above Stages   []  Not Ready for Return to Sports   Comments:            PLAN: See eval. PT 2x / week for 10-12 weeks.    [x] Continue per plan of care [] Alter current plan (see comments)  [] Plan of care initiated [] Hold pending MD visit [] Discharge    Electronically signed by: Judith Aggarwal PT

## 2019-07-01 ENCOUNTER — HOSPITAL ENCOUNTER (OUTPATIENT)
Dept: PHYSICAL THERAPY | Age: 84
Setting detail: THERAPIES SERIES
Discharge: HOME | End: 2019-07-01
Payer: MEDICARE

## 2019-07-01 PROCEDURE — 97112 NEUROMUSCULAR REEDUCATION: CPT

## 2019-07-01 PROCEDURE — 97110 THERAPEUTIC EXERCISES: CPT

## 2019-07-01 NOTE — FLOWSHEET NOTE
[address] [facility]    Physical Therapy Daily Treatment Note  Date:  2019    Patient Name:  Alex Torres    :  1934  MRN: [id number]  Medical/Treatment Diagnosis Information:  · Diagnosis: M16.11 (ICD-10-CM) - Arthritis of right hip; s/p R NE dos: 19  · Treatment Diagnosis: R26.2 difficulty walking; R53.1 weakness  Insurance/Certification information:  PT Insurance Information: Anthem Medicare - orthonet auth  Physician Information:  Referring Practitioner: Dr. Trinity Milner of care signed (Y/N): Y    Date of Patient follow up with Physician:     Progress Note: []  Yes  [x]  No  Next due by: Visit #10       Latex Allergy:  [x]NO      []YES  Preferred Language for Healthcare:   [x]English       []other:    Visit # Insurance Allowable Date Range   8 [employer]et auth  8 visits 19-19     Pain level:  0/10     SUBJECTIVE: Pt drove to therapy today for the first time without difficulty. Pt states she has been bending over to wash LE's in shower as well as pick items off of floor the last few weeks. OBJECTIVE:     Gait: WBAT  W/ straight cane, even but shortened step length due to fear of falling, mild increased trunk lean      RESTRICTIONS/PRECAUTIONS: FALL RISK, s/p R NE dos: 19    Exercises/Interventions:     Therapeutic Exercises  Resistance / level Sets/sec Reps Notes   Incline board calf stretch  30\" 3 ^   Seated HS stretch  30\" 3 Cueing needed for proper technique.               HEP      HEP      HEP   Supine hooklying clams Green band 2 10 Added , cueing for symmetrical motion   LAQ 4# 3 10 ^   Standing HS curls 4# 3 10 ^ ^   SLR - flexion  1 10 ^   Added 6/10   ^   Standing hip abduction 4# R 2# L 3 10 Added 6/10   ^ ^ cueing to decrease ER of hip and lateral trunk lean compensations   Standing HR/TR  3 10 Added 6/10   Sit to stand Lowered table (mid thigh) 3 10 Start  cueing for ambulation and return to community ambulation for shopping. Treatment/Activity Tolerance:  [x] Patient tolerated treatment well [] Patient limited by fatique  [] Patient limited by pain  [] Patient limited by other medical complications  [] Other:     Prognosis:  [x] Good [x] Fair  [] Poor    Patient Requires Follow-up: [x] Yes  [] No    Return to Play:    [x]  N/A   []  Stage 1: Intro to Strength   []  Stage 2: Return to Run and Strength   []  Stage 3: Return to Jump and Strength   []  Stage 4: Dynamic Strength and Agility   []  Stage 5: Sport Specific Training   []  Ready to Return to Play, Meets All Above Stages   []  Not Ready for Return to Sports   Comments:            PLAN: See eval. PT 2x / week for 10-12 weeks.    [x] Continue per plan of care [] Alter current plan (see comments)  [] Plan of care initiated [] Hold pending MD visit [] Discharge    Electronically signed by: Yoko Varela [id number]

## 2019-07-02 ENCOUNTER — TELEPHONE (OUTPATIENT)
Dept: ORTHOPEDIC SURGERY | Age: 84
End: 2019-07-02

## 2019-07-05 ENCOUNTER — HOSPITAL ENCOUNTER (OUTPATIENT)
Dept: PHYSICAL THERAPY | Age: 84
Setting detail: THERAPIES SERIES
Discharge: HOME | End: 2019-07-05
Payer: MEDICARE

## 2019-07-05 PROCEDURE — 97530 THERAPEUTIC ACTIVITIES: CPT

## 2019-07-05 PROCEDURE — 97112 NEUROMUSCULAR REEDUCATION: CPT

## 2019-07-05 PROCEDURE — 97110 THERAPEUTIC EXERCISES: CPT

## 2019-07-05 NOTE — PLAN OF CARE
PT Insurance Information: [employer]  Physician Information:  Referring Practitioner: Dr. Rand Caputo of care signed (Y/N): Y     Date of Patient follow up with Physician:     Progress Note: []  Yes  [x]  No  Next due by: Visit #10       Latex Allergy:  [x]NO      []YES  Preferred Language for Healthcare:   [x]English       []other:    Visit # Insurance Allowable Date Range   9 orthonet auth  8 visits 6/6/19-7/21/19     Pain level:  0/10     SUBJECTIVE:   Pt reports she remains very fearful of falling, although she has been taking some steps without her cane within her home. Pt denies any significant pain currently. OBJECTIVE: 7/5    Gait: WBAT  W/ straight cane, even but shortened step length due to fear of falling, mild increased trunk lean    PROM: mild restriction flexion possibly due to muscle guarding    Strength (0-5) Left Right   Hip Flexion - seated NT NT   Hip Abduction - sidelyling NT 2+   Quads NT 4   Hamstrings NT 4                     RESTRICTIONS/PRECAUTIONS: FALL RISK, s/p R NE dos: 5/1/19    Exercises/Interventions:     Therapeutic Exercises x30' Resistance / level Sets/sec Reps Notes   Incline board calf stretch  30\" 3 ^6/19   Seated HS stretch  30\" 3 Cueing needed for proper technique. HEP      HEP      HEP   Supine hooklying clams Green band 2 10 Added 6/17, cueing for symmetrical motion   LAQ 5# 3 10 ^6/25 ^ 7/5 cueing for eccentric control   Standing HS curls 5# 3 10 ^6/17 ^6/25 ^ 7/5   SLR - flexion  1 10 ^6/25 Pt required a break after 6th rep due to fatigue and a decrease in form.   Pt able to complete all 10 reps after a break 7/5   Added 6/10   ^6/17   Standing hip abduction 4# R 2# L 3 10 Added 6/10   ^6/17 ^6/25 cueing to decrease ER of hip and lateral trunk lean compensations   Standing HR/TR  3 10 Added 6/10 cueing for eccentric control   Sit to stand Lowered table (mid thigh) 3 10 Start 6/25 cueing for equal WB and eccentric control to table

## 2019-07-08 ENCOUNTER — HOSPITAL ENCOUNTER (OUTPATIENT)
Dept: PHYSICAL THERAPY | Age: 84
Setting detail: THERAPIES SERIES
Discharge: HOME | End: 2019-07-08
Payer: MEDICARE

## 2019-07-08 PROCEDURE — 97530 THERAPEUTIC ACTIVITIES: CPT

## 2019-07-08 PROCEDURE — 97112 NEUROMUSCULAR REEDUCATION: CPT

## 2019-07-08 PROCEDURE — 97110 THERAPEUTIC EXERCISES: CPT

## 2019-07-08 NOTE — FLOWSHEET NOTE
[address] [facility]      Physical Therapy Daily Treatment Note  Date:  2019    Patient Name:  Stephani Davis    :  1934  MRN: [id number]  Medical/Treatment Diagnosis Information:  · Diagnosis: M16.11 (ICD-10-CM) - Arthritis of right hip; s/p R NE dos: 19  · Treatment Diagnosis: R26.2 difficulty walking; R53.1 weakness  Insurance/Certification information:  PT Insurance Information: Anthem Medicare - orthonet auth  Physician Information:  Referring Practitioner: Dr. Hannah Alvarado of care signed (Y/N): Y     Date of Patient follow up with Physician:     Progress Note: []  Yes  [x]  No  Next due by: Visit #19       Latex Allergy:  [x]NO      []YES  Preferred Language for Healthcare:   [x]English       []other:    Visit # Insurance Allowable Date Range   10 orthonet auth  8 visits 19-19     Pain level:  0/10     SUBJECTIVE:   Pt was 7 minutes late for session this date. Pt states she has been bending over to don/doff pants, still not using reacher for assistance. Has still been trying to take tiny steps without use of cane. Very concerned and anxious that she is not progressing quickly enough. OBJECTIVE:     Gait: WBAT  W/ straight cane, even but shortened step length due to fear of falling, mild increased trunk lean    PROM: mild restriction flexion possibly due to muscle guarding    Strength (0-5) Left Right   Hip Flexion - seated NT NT   Hip Abduction - sidelyling NT 2+   Quads NT 4   Hamstrings NT 4                 RESTRICTIONS/PRECAUTIONS: FALL RISK, s/p R NE dos: 19    Exercises/Interventions:     Therapeutic Exercises  Resistance / level Sets/sec Reps Notes    30\" 3 ^    30\" 3 Cueing needed for proper technique.               HEP      HEP      HEP   Supine hooklying clams Green band 2 10 Added , cueing for symmetrical motion   LAQ 5# 3 10 ^ ^  cueing for eccentric control   Standing HS curls 5# 3 10

## 2019-07-11 ENCOUNTER — HOSPITAL ENCOUNTER (OUTPATIENT)
Dept: PHYSICAL THERAPY | Age: 84
Setting detail: THERAPIES SERIES
Discharge: HOME | End: 2019-07-11
Payer: MEDICARE

## 2019-07-11 PROCEDURE — 97110 THERAPEUTIC EXERCISES: CPT | Performed by: PHYSICAL THERAPIST

## 2019-07-11 PROCEDURE — 97530 THERAPEUTIC ACTIVITIES: CPT | Performed by: PHYSICAL THERAPIST

## 2019-07-11 PROCEDURE — 97112 NEUROMUSCULAR REEDUCATION: CPT | Performed by: PHYSICAL THERAPIST

## 2019-07-11 NOTE — FLOWSHEET NOTE
Training   []  Ready to Return to Play, Meets All Above Stages   []  Not Ready for Return to Sports   Comments:            PLAN:  Continue 2x/wk 6 wks   [x] Continue per plan of care [] Alter current plan (see comments)  [] Plan of care initiated [] Hold pending MD visit [] Discharge    Electronically signed by: Kelsi Chan PT

## 2019-07-15 ENCOUNTER — HOSPITAL ENCOUNTER (OUTPATIENT)
Dept: PHYSICAL THERAPY | Age: 84
Setting detail: THERAPIES SERIES
Discharge: HOME | End: 2019-07-15
Payer: MEDICARE

## 2019-07-15 PROCEDURE — 97110 THERAPEUTIC EXERCISES: CPT

## 2019-07-15 PROCEDURE — 97530 THERAPEUTIC ACTIVITIES: CPT

## 2019-07-15 PROCEDURE — 97112 NEUROMUSCULAR REEDUCATION: CPT

## 2019-07-15 NOTE — FLOWSHEET NOTE
[address] [facility]      Physical Therapy Daily Treatment Note  Date:  7/15/2019    Patient Name:  Harsh Brannon    :  1934  MRN: [id number]  Medical/Treatment Diagnosis Information:  · Diagnosis: M16.11 (ICD-10-CM) - Arthritis of right hip; s/p R NE dos: 19  · Treatment Diagnosis: R26.2 difficulty walking; R53.1 weakness  Insurance/Certification information:  PT Insurance Information: Anthem Medicare - [employer] auth  Physician Information:  Referring Practitioner: Dr. Aldair Luna of care signed (Y/N): Y     Date of Patient follow up with Physician: 19    Progress Note: []  Yes  [x]  No  Next due by: Visit #19       Latex Allergy:  [x]NO      []YES  Preferred Language for Healthcare:   [x]English       []other:    Visit # Insurance Allowable Date Range   4    10 6 visits    8 visits 19-19-19     Pain level:  0/10     SUBJECTIVE:  Continues to state frustration and depression over sister in [place] now ambulating without AD and pt still using AD for ambulation and unable to walk w/o limp. OBJECTIVE:     Gait: WBAT  W/ straight cane, even but shortened step length due to fear of falling, mild increased trunk lean    PROM: mild restriction flexion possibly due to muscle guarding    Strength (0-5) Left Right   Hip Flexion - seated NT NT   Hip Abduction - sidelyling NT 2+   Quads NT 4   Hamstrings NT 4                 RESTRICTIONS/PRECAUTIONS: FALL RISK, s/p R NE dos: 19    Exercises/Interventions:     Therapeutic Exercises  Resistance / level Sets/sec Reps Notes   Incline board calf stretch  30\" 3 ^   Seated HS stretch  30\" 3 Cueing needed for proper technique.               HEP      HEP      HEP   Supine hooklying clams Green band 3 10    bridges w/BS 5\" 15 ^   LAQ 5# 3 10 ^ ^ 7/5 cueing for eccentric control   Standing HS curls 5# 3 10 ^ ^ ^ 7/5   SLR - flexion  1 10 ^ Pt

## 2019-07-19 ENCOUNTER — APPOINTMENT (OUTPATIENT)
Dept: PHYSICAL THERAPY | Age: 84
End: 2019-07-19
Payer: MEDICARE

## 2019-07-22 ENCOUNTER — HOSPITAL ENCOUNTER (OUTPATIENT)
Dept: PHYSICAL THERAPY | Age: 84
Setting detail: THERAPIES SERIES
Discharge: HOME | End: 2019-07-22
Payer: MEDICARE

## 2019-07-22 PROCEDURE — 97112 NEUROMUSCULAR REEDUCATION: CPT

## 2019-07-22 PROCEDURE — 97110 THERAPEUTIC EXERCISES: CPT

## 2019-07-22 PROCEDURE — 97530 THERAPEUTIC ACTIVITIES: CPT

## 2019-07-25 ENCOUNTER — APPOINTMENT (OUTPATIENT)
Dept: PHYSICAL THERAPY | Age: 84
End: 2019-07-25
Payer: MEDICARE

## 2019-07-29 ENCOUNTER — OFFICE VISIT (OUTPATIENT)
Dept: ORTHOPEDIC SURGERY | Age: 84
End: 2019-07-29

## 2019-07-29 VITALS
DIASTOLIC BLOOD PRESSURE: 74 MMHG | HEART RATE: 96 BPM | BODY MASS INDEX: 20.89 KG/M2 | WEIGHT: 130 LBS | HEIGHT: 66 IN | SYSTOLIC BLOOD PRESSURE: 137 MMHG

## 2019-07-29 DIAGNOSIS — Z96.641 STATUS POST TOTAL REPLACEMENT OF RIGHT HIP: Primary | ICD-10-CM

## 2019-07-29 PROCEDURE — 99024 POSTOP FOLLOW-UP VISIT: CPT | Performed by: ORTHOPAEDIC SURGERY

## 2019-07-29 NOTE — PROGRESS NOTES
per week: None        Minutes per session: None      Stress: None    Relationships      Social connections:        Talks on phone: None        Gets together: None        Attends Voodoo service: None        Active member of club or organization: None        Attends meetings of clubs or organizations: None        Relationship status: None      Intimate partner violence:        Fear of current or ex partner: None        Emotionally abused: None        Physically abused: None        Forced sexual activity: None    Other Topics      Concerns:        None    Social History Narrative      None      Current Outpatient Medications:  rivaroxaban (XARELTO) 20 MG TABS tablet, Take 1 tablet by mouth daily (with breakfast), Disp: 30 tablet, Rfl: 5  aspirin EC 81 MG EC tablet, Take 1 tablet by mouth daily, Disp: 30 tablet, Rfl: 0  docusate sodium (COLACE, DULCOLAX) 100 MG CAPS, Take 100 mg by mouth 2 times daily, Disp: 60 capsule, Rfl: 0  lisinopril (PRINIVIL;ZESTRIL) 20 MG tablet, Take 20 mg by mouth nightly, Disp: , Rfl:   levETIRAcetam (KEPPRA) 250 MG tablet, Take 250 mg by mouth One tab in a.m. And 2 tabs at bedtime, Disp: , Rfl:   simvastatin (ZOCOR) 10 MG tablet, Take 1 tablet by mouth nightly, Disp: 30 tablet, Rfl: 5  Cholecalciferol (VITAMIN D3) 1000 units TABS, Take by mouth nightly , Disp: , Rfl:   Magnesium Hydroxide (MAGNESIA PO), Take 500 mg by mouth nightly , Disp: , Rfl:   rivaroxaban (XARELTO) 15 MG TABS tablet, Take 1 tablet by mouth 2 times daily (with meals) for 21 days, Disp: 42 tablet, Rfl: 0    No current facility-administered medications for this visit. -- Oxycodone -- Other (See Comments)    --  Depression   -- Norvasc [Amlodipine Besylate] -- Anxiety    VITAL SIGNS:  /74   Pulse 96   Ht 5' 6\" (1.676 m)   Wt 130 lb (59 kg)   BMI 20.98 kg/m²   Examination of the right hip today shows her incision is nicely healed clean and dry and no warmth erythema or induration.   She flexes to about 100

## 2019-08-02 ENCOUNTER — TELEPHONE (OUTPATIENT)
Dept: ORTHOPEDIC SURGERY | Age: 84
End: 2019-08-02

## 2019-08-06 ENCOUNTER — HOSPITAL ENCOUNTER (OUTPATIENT)
Dept: PHYSICAL THERAPY | Age: 84
Setting detail: THERAPIES SERIES
Discharge: HOME | End: 2019-08-06
Payer: MEDICARE

## 2019-08-06 PROCEDURE — 97110 THERAPEUTIC EXERCISES: CPT | Performed by: PHYSICAL THERAPIST

## 2019-08-06 PROCEDURE — 97530 THERAPEUTIC ACTIVITIES: CPT | Performed by: PHYSICAL THERAPIST

## 2019-08-06 PROCEDURE — 97112 NEUROMUSCULAR REEDUCATION: CPT | Performed by: PHYSICAL THERAPIST

## 2019-08-06 NOTE — PLAN OF CARE
[facility]     Physical Therapy Re-Certification Plan of Care    Dear  Dr. Seferino Arnold,    We had the pleasure of treating the following patient for physical therapy services at [address] [place] [address]. A summary of our findings can be found in the updated assessment below. This includes our plan of care. If you have any questions or concerns regarding these findings, please do not hesitate to contact me at the office phone number checked above. Thank you for the referral.     Physician Signature:________________________________Date:__________________  By signing above (or electronic signature), therapists plan is approved by physician      Functional Outcome: LEFS 60% limitation    Overall Response to Treatment:   []Patient is responding well to treatment and improvement is noted with regards  to goals   []Patient should continue to improve in reasonable time if they continue HEP   []Patient has plateaued and is no longer responding to skilled PT intervention    []Patient is getting worse and would benefit from return to referring MD   []Patient unable to adhere to initial POC   [x]Other: Pt. Has improved in functional mobility. Pt. Continues to have significant deficits in hip strength limiting gait. Pt. Will continue to benefit from skilled therapy to address deficits. Date range of Visits: 19-19  Total Visits: 5    Recommendation:    [x]continue PT 1x / wk for 4 weeks.     []Hold PT, pending MD visit        Physical Therapy Daily Treatment Note  Date:  2019    Patient Name:  Timmy Ramírez    :  1934  MRN: [id number]  Medical/Treatment Diagnosis Information:  · Diagnosis: M16.11 (ICD-10-CM) - Arthritis of right hip; s/p R NE dos: 19  · Treatment Diagnosis: R26.2 difficulty walking; R53.1 weakness  Insurance/Certification information:  PT Insurance Information: Anthem Medicare - orthonet auth  Physician

## 2019-08-16 ENCOUNTER — OFFICE VISIT (OUTPATIENT)
Dept: FAMILY MEDICINE CLINIC | Age: 84
End: 2019-08-16
Payer: MEDICARE

## 2019-08-16 VITALS — SYSTOLIC BLOOD PRESSURE: 120 MMHG | OXYGEN SATURATION: 98 % | DIASTOLIC BLOOD PRESSURE: 58 MMHG | HEART RATE: 90 BPM

## 2019-08-16 DIAGNOSIS — E78.2 MIXED HYPERLIPIDEMIA: ICD-10-CM

## 2019-08-16 DIAGNOSIS — I10 ESSENTIAL HYPERTENSION: Primary | Chronic | ICD-10-CM

## 2019-08-16 DIAGNOSIS — R73.02 IMPAIRED GLUCOSE TOLERANCE: ICD-10-CM

## 2019-08-16 DIAGNOSIS — Z13.1 SCREENING FOR DIABETES MELLITUS: ICD-10-CM

## 2019-08-16 DIAGNOSIS — R53.83 FATIGUE, UNSPECIFIED TYPE: ICD-10-CM

## 2019-08-16 DIAGNOSIS — Z96.641 STATUS POST TOTAL REPLACEMENT OF RIGHT HIP: ICD-10-CM

## 2019-08-16 DIAGNOSIS — Z86.718 HISTORY OF DVT (DEEP VEIN THROMBOSIS): ICD-10-CM

## 2019-08-16 PROCEDURE — 99214 OFFICE O/P EST MOD 30 MIN: CPT | Performed by: PHYSICIAN ASSISTANT

## 2019-08-16 ASSESSMENT — ENCOUNTER SYMPTOMS
COUGH: 0
ABDOMINAL PAIN: 0
BACK PAIN: 0
SHORTNESS OF BREATH: 0

## 2019-08-21 ENCOUNTER — HOSPITAL ENCOUNTER (OUTPATIENT)
Dept: PHYSICAL THERAPY | Age: 84
Setting detail: THERAPIES SERIES
Discharge: HOME | End: 2019-08-21
Payer: MEDICARE

## 2019-08-21 PROCEDURE — 97530 THERAPEUTIC ACTIVITIES: CPT

## 2019-08-21 PROCEDURE — 97112 NEUROMUSCULAR REEDUCATION: CPT

## 2019-08-21 PROCEDURE — 97110 THERAPEUTIC EXERCISES: CPT

## 2019-08-21 NOTE — FLOWSHEET NOTE
ambulation, and ascending / descending stairs. Modalities:      Charges:  Timed Code Treatment Minutes: 60   Total Treatment Minutes: 67     [] EVAL - LOW (90811)   [] EVAL - MOD (29420)  [] EVAL - HIGH (22672)  [] RE-EVAL (41278)  [x] TW(88335) x 2     [] Ionto  [x] NMR (07582) x 1     [] Vaso  [] Manual (03159) x      [] Ultrasound  [x] TA x 1      [] Mech Traction (98393)  [] Aquatic Therapy x     [] ES (un) (38738):   [] Home Management Training x [] ES(attended) (90899)   [] Group:     [] Other: gait     GOALS: 7/5/19  Patient stated goal: \"To be normal again: to be active and be able to travel to [employer]     Therapist goals for Patient:   Short Term Goals: To be achieved in: 2 weeks  1. Independent in HEP and progression per patient tolerance, in order to prevent re-injury. MET  2. Patient will have a decrease in pain to facilitate improvement in movement, function, and ADLs as indicated by Functional Deficits. MET    Long Term Goals: To be achieved in: 10-12 weeks  1. Disability index score of 40% or less for the LEFS to assist with reaching prior level of function. Not Met  2. Patient will demonstrate increased AROM to R hip WFL to allow for proper joint functioning as indicated by patients Functional Deficits. Ongoing  3. Patient will demonstrate an increase in Strength to at least 4/5 as well as good proximal hip strength and control to allow for proper functional mobility as indicated by patients Functional Deficits. Ongoing  4. Patient will return to functional activities including walking without AD and with minimal deviations without increased symptoms or restriction. Ongoing   5. Pt will be able to tolerate housework and be able to travel without problems. (patient specific functional goal)  Ongoing    Progression Towards Functional goals:  [] Patient is progressing as expected towards functional goals listed. [x] Progression is slowed due to complexities listed.   [] Progression has been

## 2019-08-27 ENCOUNTER — HOSPITAL ENCOUNTER (OUTPATIENT)
Dept: PHYSICAL THERAPY | Age: 84
Setting detail: THERAPIES SERIES
Discharge: HOME | End: 2019-08-27
Payer: MEDICARE

## 2019-08-27 PROCEDURE — 97530 THERAPEUTIC ACTIVITIES: CPT | Performed by: PHYSICAL THERAPIST

## 2019-08-27 PROCEDURE — 97110 THERAPEUTIC EXERCISES: CPT | Performed by: PHYSICAL THERAPIST

## 2019-08-27 NOTE — FLOWSHEET NOTE
tasks  []Driving [x]Sports/Recreation   []Sleeping []Other:    ASSESSMENT:      Treatment/Activity Tolerance:  [x] Patient tolerated treatment well [] Patient limited by fatique  [] Patient limited by pain  [] Patient limited by other medical complications  [x] Other: Pt. Tolerated therapy today with minimal complaints. Pt. Continues to require constant cueing and encouragement for correct completion of exercises. Able to add resistance with SLR abduction with cueing for full range when fatigued. Pt. Very fearful with stairs but improved on second flight as confidence improved. Pt. Continues to require UE support throughout with balance activities. Pt. Will continue to benefit from skilled therapy in order to improve functional strength and confidence for community navigation.      Prognosis:  [x] Good [x] Fair  [] Poor    Patient Requires Follow-up: [x] Yes  [] No    Return to Play:    [x]  N/A   []  Stage 1: Intro to Strength   []  Stage 2: Return to Run and Strength   []  Stage 3: Return to Jump and Strength   []  Stage 4: Dynamic Strength and Agility   []  Stage 5: Sport Specific Training   []  Ready to Return to Play, Meets All Above Stages   []  Not Ready for Return to Sports   Comments:            PLAN:  Continue 2x/wk 6 wks   [x] Continue per plan of care [] Alter current plan (see comments)  [] Plan of care initiated [] Hold pending MD visit [] Discharge    Electronically signed by: Dorothy Luciano PT

## 2019-09-04 ENCOUNTER — HOSPITAL ENCOUNTER (OUTPATIENT)
Dept: PHYSICAL THERAPY | Age: 84
Setting detail: THERAPIES SERIES
Discharge: HOME | End: 2019-09-04
Payer: MEDICARE

## 2019-09-04 PROCEDURE — 97530 THERAPEUTIC ACTIVITIES: CPT

## 2019-09-04 PROCEDURE — 97110 THERAPEUTIC EXERCISES: CPT

## 2019-09-04 PROCEDURE — 97112 NEUROMUSCULAR REEDUCATION: CPT

## 2019-09-04 NOTE — FLOWSHEET NOTE
stretch  30\" 3 Cueing needed for proper technique. sidelying abduction 1# 3 10 ^8/27, cueing for full range      HEP      HEP      HEP   ^7/22   sidelying clamshells  2 10 Added 7/23 8/21   bridges w/BS 5\" 20 ^7/11  ^7/22   LAQ 7.5# 3 10 ^6/25 ^ 7/5 ^8/27    7.5# 3 10 ^6/17 ^6/25 ^ 7/5 ^8/27  Held 9/4 due to increased patellar pain   SLR - flexion 1# 3 10 ^6/25  ^7/22 ^8/27   Added 6/10   ^6/17   Added 6/10   ^6/17 ^6/25 cueing to decrease ER of hip and lateral trunk lean compensations  ^7/22   Standing HR/TR  3 10 Added 6/10 cueing for eccentric control   Mini squats  3 10 ^7/11 cueing for control   bike L2 5'  Start 7/11   Lateral stepping - at ankles Lime green 10 steps 3 laps  Added 7/15  ^7/22  ^8/21   Standing hip hikes  1 20 Added 7/15 tactile cueing for correct movement          Neuromuscular Re-ed / Therapeutic Activities x16'         5'  Reviewed working on even ground walking, turning and transitional movements    Single leg balance  10\" 3 Added 6/17 Cueing to unlock knee slightly   ^7/15   Tandem balance  30\" 2 Added 6/17    Added 6/17   ^7/15   ^6/25 cueing for forward weight shift and drive to help aid pt.     Railing 2 flights  8/6 cueing for correct movement and control                        Manual Intervention       Knee mobs/PROM       Tib/Fem Mobs       Patella Mobs       Ankle mobs                         Pt. Education:   -reviewed HEP  -explained to patient reasoning for use of cane and importance of continued use of cane for long distances due to current mobility and strength deficits       Therapeutic Exercise and NMR EXR  [x] (13290) Provided verbal/tactile cueing for activities related to strengthening, flexibility, endurance, ROM for improvements in LE, proximal hip, and core control with self care, mobility, lifting, ambulation.  [] (06440) Provided verbal/tactile cueing for activities related to improving balance, coordination, kinesthetic sense, posture, motor skill, proprioception stairs. Modalities:      Charges:  Timed Code Treatment Minutes: 47   Total Treatment Minutes: 60     [] EVAL - LOW (48421)   [] EVAL - MOD (01344)  [] EVAL - HIGH (38279)  [] RE-EVAL (38904)  [x] XT(99537) x 1     [] Ionto  [x] NMR (60279) x 1     [] Vaso  [] Manual (01964) x      [] Ultrasound  [x] TA x 1      [] Mech Traction (68597)  [] Aquatic Therapy x     [] ES (un) (21089):   [] Home Management Training x [] ES(attended) (39464)   [] Group:     [] Other: gait     GOALS: 7/5/19  Patient stated goal: \"To be normal again: to be active and be able to travel to [employer]     Therapist goals for Patient:   Short Term Goals: To be achieved in: 2 weeks  1. Independent in HEP and progression per patient tolerance, in order to prevent re-injury. MET  2. Patient will have a decrease in pain to facilitate improvement in movement, function, and ADLs as indicated by Functional Deficits. MET    Long Term Goals: To be achieved in: 10-12 weeks  1. Disability index score of 40% or less for the LEFS to assist with reaching prior level of function. Not Met  2. Patient will demonstrate increased AROM to R hip WFL to allow for proper joint functioning as indicated by patients Functional Deficits. Ongoing  3. Patient will demonstrate an increase in Strength to at least 4/5 as well as good proximal hip strength and control to allow for proper functional mobility as indicated by patients Functional Deficits. Ongoing  4. Patient will return to functional activities including walking without AD and with minimal deviations without increased symptoms or restriction. Ongoing   5. Pt will be able to tolerate housework and be able to travel without problems. (patient specific functional goal)  Ongoing    Progression Towards Functional goals:  [] Patient is progressing as expected towards functional goals listed. [x] Progression is slowed due to complexities listed. [] Progression has been slowed due to co-morbidities.   [] Plan

## 2019-09-09 ENCOUNTER — OFFICE VISIT (OUTPATIENT)
Dept: ORTHOPEDIC SURGERY | Age: 84
End: 2019-09-09
Payer: MEDICARE

## 2019-09-09 VITALS
HEIGHT: 66 IN | BODY MASS INDEX: 20.89 KG/M2 | DIASTOLIC BLOOD PRESSURE: 66 MMHG | WEIGHT: 130 LBS | HEART RATE: 97 BPM | SYSTOLIC BLOOD PRESSURE: 110 MMHG

## 2019-09-09 DIAGNOSIS — Z96.641 STATUS POST TOTAL REPLACEMENT OF RIGHT HIP: Primary | ICD-10-CM

## 2019-09-09 PROCEDURE — 99212 OFFICE O/P EST SF 10 MIN: CPT | Performed by: ORTHOPAEDIC SURGERY

## 2019-09-10 ENCOUNTER — HOSPITAL ENCOUNTER (OUTPATIENT)
Dept: PHYSICAL THERAPY | Age: 84
Setting detail: THERAPIES SERIES
Discharge: HOME | End: 2019-09-10
Payer: MEDICARE

## 2019-09-10 PROCEDURE — 97530 THERAPEUTIC ACTIVITIES: CPT | Performed by: PHYSICAL THERAPIST

## 2019-09-10 PROCEDURE — 97110 THERAPEUTIC EXERCISES: CPT | Performed by: PHYSICAL THERAPIST

## 2019-09-10 NOTE — FLOWSHEET NOTE
[address] [facility]      Physical Therapy Daily Treatment Note    Date:  9/10/2019    Patient Name:  Isaac Patrick    :  1934  MRN: [id number]  Medical/Treatment Diagnosis Information:  · Diagnosis: M16.11 (ICD-10-CM) - Arthritis of right hip; s/p R NE dos: 19  · Treatment Diagnosis: R26.2 difficulty walking; R53.1 weakness  Insurance/Certification information:  PT Insurance Information: An[employer] Medicare - orthonet auth  Physician Information:  Referring Practitioner: Dr. Paulino Show of care signed (Y/N): Y     Date of Patient follow up with Physician:     Progress Note: []  Yes  [x]  No  Next due by: Visit #19       Latex Allergy:  [x]NO      []YES  Preferred Language for Healthcare:   [x]English       []other:    Visit # Insurance Allowable Date Range   4    6    8 5 visits    6 visits    8 visits 19-19-19-19     Pain level:  0/10     SUBJECTIVE: Pt. Denies pain in her hip at this time. Pt. Saw referring MD yesterday who is pleased with progress but would like her to continue to get stronger. Pt. States that her knee pain has subsided completely. Pt. Reports compliance with HEP. OBJECTIVE:     TU sec w/ straight cane, 18 sec w/out AD    Gait: FWB with straight cane, mild hip drop   Without AD: antalgic gait, significant hip drop, decreased stance time and L step length    PROM: mild restriction flexion    Strength (0-5) Left Right   Hip Flexion - seated 4+ 4   Hip Abduction - sidelyling NT 3   Quads 4+ 4+   Hamstrings 4+ 4+               RESTRICTIONS/PRECAUTIONS: FALL RISK, s/p R NE dos: 19    Exercises/Interventions:     Therapeutic Exercises x25' Resistance / level Sets/sec Reps Notes   Incline board calf stretch  30\" 3 ^   Seated HS stretch  30\" 3 Cueing needed for proper technique.     sidelying abduction 2# 3 10 ^9/10, cueing for full range and to decrease compensations      HEP HEP      HEP   ^7/22   sidelying clamshells  2 10 Added 7/23 8/21   bridges w/BS 5\" 20 ^7/11  ^7/22   LAQ 7.5# 3 10 ^6/25 ^ 7/5 ^8/27   Standing HS curls 7.5# 3 10 ^6/17 ^6/25 ^ 7/5 ^8/27    SLR - flexion 2# 3 10 ^6/25  ^7/22 ^8/27 ^9/10   Added 6/10   ^6/17   Added 6/10   ^6/17 ^6/25 cueing to decrease ER of hip and lateral trunk lean compensations  ^7/22   Standing HR/TR  3 10 Added 6/10 cueing for eccentric control   Mini squats  3 10 ^7/11 cueing for control   bike L2 5'  Start 7/11   Lateral stepping - at ankles [place]ime green 10 steps 3 laps  Added 7/15  ^7/22  ^8/21          Neuromuscular Re-ed / Therapeutic Activities x14'         5'  Reviewed working on even ground walking, turning and transitional movements    Single leg balance  15\" 3 Added 6/17 Cueing to unlock knee slightly   ^7/15 ^9/10 cueing to decrease hip drop   Tandem balance  30\" 2 Added 6/17    Added 6/17   ^7/15   ^6/25 cueing for forward weight shift and drive to help aid pt.   stairs Railing 2 flights  8/6 cueing for correct movement and control                        Manual Intervention       Knee mobs/PROM       Tib/Fem Mobs       Patella Mobs       Ankle mobs                         Pt. Education:   -reviewed HEP      Therapeutic Exercise and NMR EXR  [x] (19272) Provided verbal/tactile cueing for activities related to strengthening, flexibility, endurance, ROM for improvements in LE, proximal hip, and core control with self care, mobility, lifting, ambulation.  [] (13795) Provided verbal/tactile cueing for activities related to improving balance, coordination, kinesthetic sense, posture, motor skill, proprioception  to assist with LE, proximal hip, and core control in self care, mobility, lifting, ambulation and eccentric single leg control.      NMR and Therapeutic Activities:    [x] (63459 or 54001) Provided verbal/tactile cueing for activities related to improving balance, coordination, kinesthetic sense, posture, motor skill,

## 2019-09-17 ENCOUNTER — HOSPITAL ENCOUNTER (OUTPATIENT)
Dept: PHYSICAL THERAPY | Age: 84
Setting detail: THERAPIES SERIES
Discharge: HOME | End: 2019-09-17
Payer: MEDICARE

## 2019-09-17 ENCOUNTER — TELEPHONE (OUTPATIENT)
Dept: FAMILY MEDICINE CLINIC | Age: 84
End: 2019-09-17

## 2019-09-17 PROCEDURE — 97110 THERAPEUTIC EXERCISES: CPT | Performed by: PHYSICAL THERAPIST

## 2019-09-17 PROCEDURE — 97530 THERAPEUTIC ACTIVITIES: CPT | Performed by: PHYSICAL THERAPIST

## 2019-09-17 NOTE — PLAN OF CARE
Progress Note: [x]  Yes  []  No  Next due by: Visit #19       Latex Allergy:  [x]NO      []YES  Preferred Language for Healthcare:   [x]English       []other:    Visit # Insurance Allowable Date Range   5    6  k    8 5 visits    6 visits    8 visits 19-19    7/-19-19     Pain level:  0/10     SUBJECTIVE: Pt. Reports that overall her hip is feeling good. She does not have pain at this time. Pt. Reports that she has some soreness at night with rolling in bed. Pt. Is ready for d/c to home program.     OBJECTIVE:     TU sec w/out AD    Sit to stand: requires UE support    Gait: Without AD: FWB mild hip drop    Strength (0-5) Left Right   Hip Flexion - seated 4+ 4+   Hip Abduction - sidelyling NT 4-   Quads 4+ 4+   Hamstrings 4+ 4+               RESTRICTIONS/PRECAUTIONS: FALL RISK, s/p R NE dos: 19    Exercises/Interventions:     Therapeutic Exercises x30' Resistance / level Sets/sec Reps Notes   Incline board calf stretch  30\" 3 ^   Seated HS stretch  30\" 3 Cueing needed for proper technique.     sidelying abduction 2# 3 10 ^9/10, cueing for full range and to decrease compensations      HEP      HEP      HEP   ^   sidelying clamshells  2 10 Added    bridges w/BS 5\" 20 ^  ^   LAQ 7.5# 3 10 ^ ^ 7 ^   Standing HS curls 7.5# 3 10 ^ ^ ^  ^    SLR - flexion 2# 3 10 ^  ^ ^ ^9/10   Added 6/10   ^   Added 6/10   ^ ^ cueing to decrease ER of hip and lateral trunk lean compensations  ^   Standing HR/TR  3 10 Added 6/10 cueing for eccentric control   Sit to stand airex in chair 3 10 ^ cueing for control   bike L2 5'  Start    Lateral stepping - at ankles [place]ime green 10 steps 3 laps  Added 7/15  ^  ^          Neuromuscular Re-ed / Therapeutic Activities x10'           Reviewed working on even ground walking, turning and transitional movements    Single leg balance  15\" 3 Added  Cueing to unlock knee slightly   ^7/15 ^9/10 cueing to decrease hip drop   Tandem balance  30\" 2 Added 6/17    Added 6/17   ^7/15   ^6/25 cueing for forward weight shift and drive to help aid pt.    8/6 cueing for correct movement and control                        Manual Intervention       Knee mobs/PROM       Tib/Fem Mobs       Patella Mobs       Ankle mobs                         Pt. Education:   -reviewed HEP      Therapeutic Exercise and NMR EXR  [x] (33154) Provided verbal/tactile cueing for activities related to strengthening, flexibility, endurance, ROM for improvements in LE, proximal hip, and core control with self care, mobility, lifting, ambulation.  [] (30980) Provided verbal/tactile cueing for activities related to improving balance, coordination, kinesthetic sense, posture, motor skill, proprioception  to assist with LE, proximal hip, and core control in self care, mobility, lifting, ambulation and eccentric single leg control.      NMR and Therapeutic Activities:    [x] (10720 or 45660) Provided verbal/tactile cueing for activities related to improving balance, coordination, kinesthetic sense, posture, motor skill, proprioception and motor activation to allow for proper function of core, proximal hip and LE with self care and ADLs  [x] (85837) Gait Re-education- Provided training and instruction to the patient for proper LE, core and proximal hip recruitment and positioning and eccentric body weight control with ambulation re-education including up and down stairs     Home Exercise Program:    [x] (66540) Reviewed/Progressed HEP activities related to strengthening, flexibility, endurance, ROM of core, proximal hip and LE for functional self-care, mobility, lifting and ambulation/stair navigation   [] (03934)Reviewed/Progressed HEP activities related to improving balance, coordination, kinesthetic sense, posture, motor skill, proprioception of core, proximal hip and LE for self care, mobility, lifting, and allow for proper joint functioning as indicated by patients Functional Deficits. MET  3. Patient will demonstrate an increase in Strength to at least 4/5 as well as good proximal hip strength and control to allow for proper functional mobility as indicated by patients Functional Deficits. PARTIALLY MET  4. Patient will return to functional activities including walking without AD and with minimal deviations without increased symptoms or restriction. MET   5. Pt will be able to tolerate housework and be able to travel without problems. (patient specific functional goal)  MET    Progression Towards Functional goals:  [] Patient is progressing as expected towards functional goals listed. [x] Progression is slowed due to complexities listed. [] Progression has been slowed due to co-morbidities. [] Plan just implemented, too soon to assess goals progression  [] Other:     Persisting Functional Limitations/Impairments:  []Sitting []Standing   []Walking []Stairs   []Transfers []ADLs   [x]Squatting/bending [x]Kneeling   []Housework []Job related tasks  []Driving [x]Sports/Recreation   []Sleeping []Other:    ASSESSMENT:      Treatment/Activity Tolerance:  [x] Patient tolerated treatment well [] Patient limited by fatique  [] Patient limited by pain  [] Patient limited by other medical complications  [x] Other: Pt. Tolerated therapy today without complaints. Pt. Demonstrates improved functional strength and mobility. Patient is currently independent with symptom management and home exercise program. Patient reports understanding of the importance of continued compliance with home exercise program after discharge. Patient should reach all long term goals with compliance to home exercise program upon discharge.       Prognosis:  [x] Good [x] Fair  [] Poor    Patient Requires Follow-up: [] Yes  [x] No    Return to Play:    [x]  N/A   []  Stage 1: Intro to Strength   []  Stage 2: Return to Run and Strength   []  Stage 3:

## 2019-09-17 NOTE — TELEPHONE ENCOUNTER
The patient was returning noah's call. Wanted to know if we checked her iron levels and what her glucose reading was.

## 2019-09-27 DIAGNOSIS — E78.2 MIXED HYPERLIPIDEMIA: ICD-10-CM

## 2019-09-30 RX ORDER — SIMVASTATIN 10 MG
TABLET ORAL
Qty: 90 TABLET | Refills: 1 | Status: SHIPPED | OUTPATIENT
Start: 2019-09-30 | End: 2020-03-31

## 2019-10-24 NOTE — PROGRESS NOTES
[facility] has a past surgical history that includes Appendectomy; joint replacement; Salpingo-oophorectomy (12/11/2017); Colonoscopy; and Total hip arthroplasty (Right, 5/1/2019). Restrictions  Position Activity Restriction  Other position/activity restrictions: anterolateral approach, FWBAT, No hip adduction beyond neutral, no external rotation and no hyperextension  Subjective   General  Chart Reviewed: Yes  Additional Pertinent Hx: 80 y.o. F admitted 5/1 s/p R NE. PMHx includes arthritis, HTN, seizure, L NE  Family / Caregiver Present: No  Referring Practitioner: Celestine Gravely  Diagnosis: s/p NE  Subjective  Subjective: Patient finishing up with PT upon arrival. In supine in bed. Pleasant and agreeable to participate in OT sesesion. Remains slightly anxious with movement  Vital Signs  Patient Currently in Pain: No   Orientation     Objective    ADL  Feeding: Independent  Grooming: Independent  UE Dressing: Modified independent (For adjusting/tieing gown)  LE Dressing: Moderate assistance(for donning/doffing pants during toileting)  Toileting: Contact guard assistance  Additional Comments: Pt educated on anterior hip precautions as they relate to mobility and ADLs - pt verb understanding        Balance  Sitting Balance: Independent  Standing Balance: Contact guard assistance  Standing Balance  Time: 1 minute + 4 minutes + 1 minute   Activity: functional mobility in room, bathroom  Comment: Pt requires min assist sit to stand with min cues, CGA for stance and gait with walker (slow, cautious with KI in place due to instability from block), CGA stand to sit.    Bed mobility  Supine to Sit: Minimal assistance  Sit to Supine: Minimal assistance  Scooting: Minimal assistance  Transfers  Sit to stand: Minimal assistance  Stand to sit: Contact guard assistance           Plan   Plan  Times per week: 7x  Times per day: Daily    AM-PAC Score        AM-PAC Inpatient Daily Activity Raw Score: 20  AM-PAC Inpatient ADL T-Scale Score : 42.03  ADL Inpatient CMS 0-100% Score: 38.32  ADL Inpatient CMS G-Code Modifier : CJ    Goals  Short term goals  Time Frame for Short term goals: by D/C  Short term goal 1: Valerie Bandar pants/brief with SBA - Not met 5/5  Short term goal 2: Transfer to/from commode and chairs SBA - Not met 5/5  Short term goal 3: Stand at sink with spvn for grooming - Not met 5/5  Patient Goals   Patient goals : to regain independence and get back to active lifestyle       Therapy Time   Individual Concurrent Group Co-treatment   Time In 1320         Time Out 1400         Minutes 40         Timed Code Treatment Minutes: 40 Minutes     If patient discharges prior to next treatment, this note will serve as discharge summary. Continue per POC if patient does not discharge.        Kelsi Cornelius, OT   OTR/L, 9948 No

## 2019-10-28 ENCOUNTER — OFFICE VISIT (OUTPATIENT)
Dept: ORTHOPEDIC SURGERY | Age: 84
End: 2019-10-28
Payer: MEDICARE

## 2019-10-28 VITALS
DIASTOLIC BLOOD PRESSURE: 70 MMHG | HEART RATE: 86 BPM | HEIGHT: 66 IN | SYSTOLIC BLOOD PRESSURE: 134 MMHG | BODY MASS INDEX: 20.89 KG/M2 | WEIGHT: 130 LBS

## 2019-10-28 DIAGNOSIS — Z96.641 STATUS POST TOTAL REPLACEMENT OF RIGHT HIP: Primary | ICD-10-CM

## 2019-10-28 PROCEDURE — 99212 OFFICE O/P EST SF 10 MIN: CPT | Performed by: ORTHOPAEDIC SURGERY

## 2019-10-30 DIAGNOSIS — I10 ESSENTIAL HYPERTENSION: ICD-10-CM

## 2019-10-30 RX ORDER — LISINOPRIL 20 MG/1
TABLET ORAL
Qty: 90 TABLET | Refills: 1 | Status: SHIPPED | OUTPATIENT
Start: 2019-10-30 | End: 2020-03-31 | Stop reason: SDUPTHER

## 2019-11-04 ENCOUNTER — OFFICE VISIT (OUTPATIENT)
Dept: FAMILY MEDICINE CLINIC | Age: 84
End: 2019-11-04
Payer: MEDICARE

## 2019-11-04 VITALS
BODY MASS INDEX: 20.82 KG/M2 | SYSTOLIC BLOOD PRESSURE: 128 MMHG | WEIGHT: 129 LBS | DIASTOLIC BLOOD PRESSURE: 60 MMHG | HEART RATE: 83 BPM | OXYGEN SATURATION: 97 %

## 2019-11-04 DIAGNOSIS — Z23 NEED FOR VACCINATION FOR STREP PNEUMONIAE: ICD-10-CM

## 2019-11-04 DIAGNOSIS — I10 ESSENTIAL HYPERTENSION: Chronic | ICD-10-CM

## 2019-11-04 DIAGNOSIS — Z86.718 HISTORY OF DVT (DEEP VEIN THROMBOSIS): Primary | ICD-10-CM

## 2019-11-04 DIAGNOSIS — I82.4Y1 ACUTE DEEP VEIN THROMBOSIS (DVT) OF PROXIMAL VEIN OF RIGHT LOWER EXTREMITY (HCC): ICD-10-CM

## 2019-11-04 PROCEDURE — G0009 ADMIN PNEUMOCOCCAL VACCINE: HCPCS | Performed by: PHYSICIAN ASSISTANT

## 2019-11-04 PROCEDURE — 99214 OFFICE O/P EST MOD 30 MIN: CPT | Performed by: PHYSICIAN ASSISTANT

## 2019-11-04 PROCEDURE — 90732 PPSV23 VACC 2 YRS+ SUBQ/IM: CPT | Performed by: PHYSICIAN ASSISTANT

## 2019-11-04 ASSESSMENT — ENCOUNTER SYMPTOMS
VOICE CHANGE: 0
EYE PAIN: 0
TROUBLE SWALLOWING: 0
DIARRHEA: 0
COUGH: 0
CHEST TIGHTNESS: 0
SORE THROAT: 0
CONSTIPATION: 0
BACK PAIN: 0
ABDOMINAL PAIN: 0
SHORTNESS OF BREATH: 0

## 2019-11-27 DIAGNOSIS — I82.4Y1 ACUTE DEEP VEIN THROMBOSIS (DVT) OF PROXIMAL VEIN OF RIGHT LOWER EXTREMITY (HCC): ICD-10-CM

## 2019-12-02 ENCOUNTER — TELEPHONE (OUTPATIENT)
Dept: ORTHOPEDIC SURGERY | Age: 84
End: 2019-12-02

## 2019-12-30 DIAGNOSIS — I82.4Y1 ACUTE DEEP VEIN THROMBOSIS (DVT) OF PROXIMAL VEIN OF RIGHT LOWER EXTREMITY (HCC): ICD-10-CM

## 2020-03-31 RX ORDER — LISINOPRIL 20 MG/1
TABLET ORAL
Qty: 90 TABLET | Refills: 0 | Status: SHIPPED | OUTPATIENT
Start: 2020-03-31 | End: 2020-07-20

## 2020-03-31 RX ORDER — SIMVASTATIN 10 MG
TABLET ORAL
Qty: 90 TABLET | Refills: 0 | Status: SHIPPED | OUTPATIENT
Start: 2020-03-31 | End: 2020-06-26

## 2020-06-09 ENCOUNTER — OFFICE VISIT (OUTPATIENT)
Dept: FAMILY MEDICINE CLINIC | Age: 85
End: 2020-06-09
Payer: MEDICARE

## 2020-06-09 VITALS
DIASTOLIC BLOOD PRESSURE: 62 MMHG | OXYGEN SATURATION: 98 % | TEMPERATURE: 98.1 F | SYSTOLIC BLOOD PRESSURE: 116 MMHG | HEART RATE: 72 BPM

## 2020-06-09 PROCEDURE — 99214 OFFICE O/P EST MOD 30 MIN: CPT | Performed by: PHYSICIAN ASSISTANT

## 2020-06-09 ASSESSMENT — ENCOUNTER SYMPTOMS
BACK PAIN: 0
CONSTIPATION: 0
ABDOMINAL PAIN: 0
VOICE CHANGE: 0
EYE PAIN: 0
SHORTNESS OF BREATH: 0
TROUBLE SWALLOWING: 0
DIARRHEA: 0
COUGH: 0
CHEST TIGHTNESS: 0
SORE THROAT: 0

## 2020-06-09 ASSESSMENT — PATIENT HEALTH QUESTIONNAIRE - PHQ9
SUM OF ALL RESPONSES TO PHQ9 QUESTIONS 1 & 2: 2
1. LITTLE INTEREST OR PLEASURE IN DOING THINGS: 1
SUM OF ALL RESPONSES TO PHQ QUESTIONS 1-9: 2
2. FEELING DOWN, DEPRESSED OR HOPELESS: 1
SUM OF ALL RESPONSES TO PHQ QUESTIONS 1-9: 2

## 2020-06-09 NOTE — PROGRESS NOTES
visit: History of DVT (deep vein thrombosis)    Essential hypertension  -     Comprehensive Metabolic Panel    Mixed hyperlipidemia  -     LIPID PANEL; Future    Impaired glucose tolerance  -     Comprehensive Metabolic Panel  -     Hemoglobin A1C         Plan:      Get labs, stable conditions, return here in a year or sooner if needed.         Yo Zazueta

## 2020-06-23 ENCOUNTER — OFFICE VISIT (OUTPATIENT)
Dept: FAMILY MEDICINE CLINIC | Age: 85
End: 2020-06-23
Payer: MEDICARE

## 2020-06-23 VITALS
DIASTOLIC BLOOD PRESSURE: 66 MMHG | SYSTOLIC BLOOD PRESSURE: 122 MMHG | TEMPERATURE: 98.1 F | OXYGEN SATURATION: 100 % | HEART RATE: 99 BPM

## 2020-06-23 PROCEDURE — 99213 OFFICE O/P EST LOW 20 MIN: CPT | Performed by: PHYSICIAN ASSISTANT

## 2020-06-23 ASSESSMENT — ENCOUNTER SYMPTOMS
NAUSEA: 0
VOMITING: 0
BACK PAIN: 0
DIARRHEA: 0
SHORTNESS OF BREATH: 0
ABDOMINAL PAIN: 1
CONSTIPATION: 0

## 2020-06-23 NOTE — PROGRESS NOTES
alert. Mental status is at baseline. Psychiatric:         Attention and Perception: Attention normal.         Mood and Affect: Mood is anxious. Speech: Speech normal.         Behavior: Behavior normal. Behavior is cooperative. Assessment:      Amanda Rizo was seen today for abdominal pain. Diagnoses and all orders for this visit:    Epigastric abdominal pain  -     EGD Routine; Future             Plan:      Get EGD.         Yo Del Rosario

## 2020-06-23 NOTE — PATIENT INSTRUCTIONS
Claude Juarez was seen today for abdominal pain. Diagnoses and all orders for this visit:    Epigastric abdominal pain  -     EGD Routine; Future       Schedule EGD.

## 2020-07-14 ENCOUNTER — OFFICE VISIT (OUTPATIENT)
Dept: PRIMARY CARE CLINIC | Age: 85
End: 2020-07-14
Payer: MEDICARE

## 2020-07-14 PROCEDURE — 99211 OFF/OP EST MAY X REQ PHY/QHP: CPT | Performed by: FAMILY MEDICINE

## 2020-07-14 NOTE — PATIENT INSTRUCTIONS

## 2020-07-14 NOTE — PROGRESS NOTES
Kristin Noonan received a viral test for COVID-19. They were educated on isolation and quarantine as appropriate. For any symptoms, they were directed to seek care from their PCP, given contact information to establish with a doctor, directed to an urgent care or the emergency room.

## 2020-07-19 LAB
SARS-COV-2: NOT DETECTED
SOURCE: NORMAL

## 2020-07-20 RX ORDER — LISINOPRIL 20 MG/1
TABLET ORAL
Qty: 90 TABLET | Refills: 3 | Status: SHIPPED | OUTPATIENT
Start: 2020-07-20 | End: 2021-07-16

## 2020-12-29 RX ORDER — SIMVASTATIN 10 MG
TABLET ORAL
Qty: 90 TABLET | Refills: 1 | Status: SHIPPED | OUTPATIENT
Start: 2020-12-29 | End: 2021-10-08 | Stop reason: SDUPTHER

## 2020-12-29 RX ORDER — SIMVASTATIN 10 MG
TABLET ORAL
Qty: 90 TABLET | Refills: 1 | Status: SHIPPED | OUTPATIENT
Start: 2020-12-29 | End: 2021-10-08

## 2020-12-29 NOTE — TELEPHONE ENCOUNTER
Medication:   Requested Prescriptions     Pending Prescriptions Disp Refills    simvastatin (ZOCOR) 10 MG tablet [Pharmacy Med Name: SIMVASTATIN 10 MG TABLET] 90 tablet 1     Sig: TAKE 1 TABLET BY MOUTH EVERY DAY EVERY NIGHT       Last Filled:  9/29/2020    Patient Phone Number: [phone] (home)     Last appt: 6/23/2020   Next appt: Visit date not found    Last Lipid:   Lab Results   Component Value Date    CHOL 183 06/19/2020    TRIG 145 06/19/2020    HDL 66 06/19/2020    [address] 88 06/19/2020

## 2021-01-16 ENCOUNTER — TELEPHONE (OUTPATIENT)
Dept: FAMILY MEDICINE CLINIC | Age: 86
End: 2021-01-16

## 2021-07-16 ENCOUNTER — TELEPHONE (OUTPATIENT)
Dept: FAMILY MEDICINE CLINIC | Age: 86
End: 2021-07-16

## 2021-07-16 DIAGNOSIS — I10 ESSENTIAL HYPERTENSION: ICD-10-CM

## 2021-07-16 RX ORDER — LISINOPRIL 20 MG/1
TABLET ORAL
Qty: 90 TABLET | Refills: 0 | Status: SHIPPED | OUTPATIENT
Start: 2021-07-16 | End: 2021-10-08 | Stop reason: SDUPTHER

## 2021-10-05 ENCOUNTER — TELEPHONE (OUTPATIENT)
Dept: FAMILY MEDICINE CLINIC | Age: 86
End: 2021-10-05

## 2021-10-05 DIAGNOSIS — Z13.220 SCREENING, LIPID: Primary | ICD-10-CM

## 2021-10-05 DIAGNOSIS — Z13.1 SCREENING FOR DIABETES MELLITUS: ICD-10-CM

## 2021-10-08 ENCOUNTER — OFFICE VISIT (OUTPATIENT)
Dept: FAMILY MEDICINE CLINIC | Age: 86
End: 2021-10-08
Payer: MEDICARE

## 2021-10-08 VITALS
TEMPERATURE: 98.1 F | BODY MASS INDEX: 21.66 KG/M2 | OXYGEN SATURATION: 98 % | HEIGHT: 65 IN | WEIGHT: 130 LBS | HEART RATE: 80 BPM | SYSTOLIC BLOOD PRESSURE: 100 MMHG | DIASTOLIC BLOOD PRESSURE: 70 MMHG

## 2021-10-08 DIAGNOSIS — I10 ESSENTIAL HYPERTENSION: ICD-10-CM

## 2021-10-08 DIAGNOSIS — E78.2 MIXED HYPERLIPIDEMIA: ICD-10-CM

## 2021-10-08 DIAGNOSIS — Z78.0 MENOPAUSE: Primary | ICD-10-CM

## 2021-10-08 PROCEDURE — G0439 PPPS, SUBSEQ VISIT: HCPCS | Performed by: PHYSICIAN ASSISTANT

## 2021-10-08 PROCEDURE — 99214 OFFICE O/P EST MOD 30 MIN: CPT | Performed by: PHYSICIAN ASSISTANT

## 2021-10-08 RX ORDER — SIMVASTATIN 10 MG
TABLET ORAL
Qty: 90 TABLET | Refills: 3 | Status: SHIPPED | OUTPATIENT
Start: 2021-10-08 | End: 2021-10-13 | Stop reason: SDUPTHER

## 2021-10-08 RX ORDER — LISINOPRIL 20 MG/1
TABLET ORAL
Qty: 90 TABLET | Refills: 3 | Status: SHIPPED | OUTPATIENT
Start: 2021-10-08 | End: 2022-10-10 | Stop reason: SDUPTHER

## 2021-10-08 ASSESSMENT — PATIENT HEALTH QUESTIONNAIRE - PHQ9
SUM OF ALL RESPONSES TO PHQ9 QUESTIONS 1 & 2: 2
1. LITTLE INTEREST OR PLEASURE IN DOING THINGS: 1
SUM OF ALL RESPONSES TO PHQ QUESTIONS 1-9: 2
2. FEELING DOWN, DEPRESSED OR HOPELESS: 1
SUM OF ALL RESPONSES TO PHQ QUESTIONS 1-9: 2
SUM OF ALL RESPONSES TO PHQ QUESTIONS 1-9: 2

## 2021-10-08 NOTE — PATIENT INSTRUCTIONS
[facility] was seen today for medicare aw. Diagnoses and all orders for this visit:    Menopause  -     DEXA BONE DENSITY AXIAL SKELETON; Future    Mixed hyperlipidemia  -     simvastatin (ZOCOR) 10 MG tablet; TAKE 1 TABLET BY MOUTH EVERY DAY EVERY NIGHT    Essential hypertension  -     lisinopril (PRINIVIL;ZESTRIL) 20 MG tablet; TAKE 1 TABLET BY MOUTH EVERY DAY       Get DEXA scan and get repeat lipid panel in 6 months, call our office for the order. Try to eliminate any type of fat in your diet other than Unsaturated fats.

## 2021-10-08 NOTE — PROGRESS NOTES
[facility] VISIT    Patient is here for their Medicare Annual Wellness Visit and to review her chronic medical conditions. Last eye exam: 2 months ago  Last dental exam: 2020  Exercise: walks a lot, heavy yard work also  Diet: well balanced, on average, 2 meals per day    How would you rate your overall health? : Very good        Fall Risk 10/8/2021 6/9/2020 4/23/2019 4/6/2018   2 or more falls in past year? no no no no   Fall with injury in past year? no no no no       PHQ Scores 10/8/2021 6/9/2020 2/7/2019 4/6/2018   PHQ2 Score 2 2 2 2   PHQ9 Score 2 2 2 2     She had some depression before she took Lavetta Constant when her  passed away but then she started taking Keppra and kind of thinks it is because of that. She does not want to take anything for her depression. She deals with it by exercising and talking to her family. Do you always wear a seat belt in the car?: Yes      Have you noted any problems with hearing?: No  Have you noted any vision problems?: No  Do you have concerns about your sexual health?: no  In the past month how much has pain been an issue for you?:  Not at all  In the past month have you had issues with anxiety, loneliness, irritability or fatigue:  Not at all    Do you take opioid medications even sometimes? No   Living Will: Yes,   Has a trust fund    Who lives at home with you: lives by herself  Do you have any services coming to your home (meals on wheels, home health, etc) ? : no      Do you need help with:  Using the phone:  No  Bathing: No  Dressing:  No  Toileting: No  Transportation:  No  Shopping: No  Preparing meals: No  Housework/Laundry: No  Medications: No  Money management: No    Does your home have:  Unsecured throw rugs: No  Grab bars in bathroom: No  Walk in shower: No  Seat in shower: Yes- does not use it  Lit pathways for night (nightlights): Yes    Memory:  Have you or anyone close to you expressed concerns about your memory: No    Knows:  Month: Yes  Day: Yes  Year: Yes  Day of Week: Yes  Able to Recall (orange, boat, pencil) : No-  2 words    Patient history:   Patient's medications, allergies, past medical, surgical, social and family histories were reviewed and updated in the EHR under History. Reviewed    Care Team:  Patient's list of care team members was updated in EHR under the Snap Shot. Neurology for her ?seizures. She had a questionable one in 2/19 and she has been on meds since. She will discuss with him in November about going off the [employer]. Immunizations: Reviewed with patient. She is not getting flu vaccines anymore. She does not want Covid vaccine either. Current with pneumovax 23. She is due for prevnar 13 booster but wants to wait. Health Maintenance Due   Topic Date Due    COVID-19 Vaccine (1) Never done    Shingles Vaccine (1 of 2) Never done    DTaP/Tdap/Td vaccine (1 - Tdap) 11/21/2008    Annual Wellness Visit (AWV)  Never done    Flu vaccine (1) 09/01/2021       CHRONIC CONDITIONS    She has had trouble sleeping on and off for the past year. She will have trouble every other night. She has not taken anything for it. But it does not seem to bother anything in her daily life. She is not checking her BP at all anymore. But she takes her medicine as directed, no SE's. She has no bowel/bladder issues. She just had labs done a few days ago. Small little things make her cry. She has always been like this since her  passed. But she never wants to take any medication for it. She feels she has a good support system. Physical Exam:    Body mass index is 21.63 kg/m².   Vitals:    10/08/21 1110   Pulse: 80   Temp: 98.1 °F (36.7 °C)   TempSrc: Tympanic   SpO2: 98%   Weight: 130 lb (59 kg)   Height: 5' 5\" (1.651 m)     Wt Readings from Last 3 Encounters:   10/08/21 130 lb (59 kg)   11/04/19 129 lb (58.5 kg)   10/28/19 130 lb (59 kg)       GENERAL:Alert and oriented x 4 NAD, no acute distress, normally developed, affect appropriate and normal appearing weight, well hydrated, well developed. LUNG:clear to auscultation bilaterally with normal respiratory effort  CV: Normal heart sounds, regular rate and rhythm without murmurs  EXTREMETY: no loss of hair, no edema, normal pedal pulses bilaterally    Was the timed get up and go unsteady or longer than 20 seconds: No    Vision Screen for Initial Exam: gets routine eye exams    EKG for Initial Exam at 72 (): not applicable    AAA U/S screen for men 65-75 who smoked (): not applicable    Manpreet was seen today for medicare aw. Diagnoses and all orders for this visit:    Menopause  -     DEXA BONE DENSITY AXIAL SKELETON; Future    Mixed hyperlipidemia  -     simvastatin (ZOCOR) 10 MG tablet; TAKE 1 TABLET BY MOUTH EVERY DAY EVERY NIGHT    Essential hypertension  -     lisinopril (PRINIVIL;ZESTRIL) 20 MG tablet; TAKE 1 TABLET BY MOUTH EVERY DAY       Get DEXA scan, stable conditions, refills given, return here in a year, get lipid panel in 6 months.

## 2021-10-12 DIAGNOSIS — E78.2 MIXED HYPERLIPIDEMIA: ICD-10-CM

## 2021-10-12 NOTE — TELEPHONE ENCOUNTER
simvastatin (ZOCOR) 10 MG tablet [[id number]]        Patient says she wants to have her simvastatin upped to 20mg instead of the 10mg.     Please advise

## 2021-10-13 RX ORDER — SIMVASTATIN 20 MG
TABLET ORAL
Qty: 90 TABLET | Refills: 3 | Status: SHIPPED | OUTPATIENT
Start: 2021-10-13 | End: 2022-10-10 | Stop reason: SDUPTHER

## 2021-11-04 ENCOUNTER — HOSPITAL ENCOUNTER (OUTPATIENT)
Dept: GENERAL RADIOLOGY | Age: 86
Discharge: HOME | End: 2021-11-04
Payer: MEDICARE

## 2021-11-04 DIAGNOSIS — Z78.0 MENOPAUSE: ICD-10-CM

## 2021-11-04 PROCEDURE — 77080 DXA BONE DENSITY AXIAL: CPT

## 2021-11-09 RX ORDER — DENOSUMAB 60 MG/ML
60 INJECTION SUBCUTANEOUS ONCE
Qty: 180 ML | Refills: 1 | Status: SHIPPED | OUTPATIENT
Start: 2021-11-09 | End: 2021-11-09

## 2021-11-11 ENCOUNTER — TELEPHONE (OUTPATIENT)
Dept: ADMINISTRATIVE | Age: 86
End: 2021-11-11

## 2022-05-20 ENCOUNTER — TELEPHONE (OUTPATIENT)
Dept: FAMILY MEDICINE CLINIC | Age: 87
End: 2022-05-20

## 2022-05-20 NOTE — TELEPHONE ENCOUNTER
Received consult letter from Dr. Clark Montilla @ [address] [place] [address] [address] Neurology. Scanned to encounter.

## 2022-09-19 ENCOUNTER — TELEPHONE (OUTPATIENT)
Dept: FAMILY MEDICINE CLINIC | Age: 87
End: 2022-09-19

## 2022-09-19 DIAGNOSIS — Z00.00 WELL ADULT EXAM: Primary | ICD-10-CM

## 2022-09-19 DIAGNOSIS — I10 ESSENTIAL HYPERTENSION: ICD-10-CM

## 2022-09-19 NOTE — TELEPHONE ENCOUNTER
----- Message from Kiratraci Urban sent at 9/14/2022  2:14 PM EDT -----  Subject: Appointment Request    Reason for Call: Established Patient Appointment needed: New Patient   Request Appointment    QUESTIONS    Reason for appointment request? Available appointments did not meet   patient need     Additional Information for Provider? PT was patient of Brittnee Gaspar. Would like to schedule her yearly AWV. Pt prefers to be seen by Dr Selena Quiroz. Please advise.  Open to any day except Thursday.   ---------------------------------------------------------------------------  --------------  [address] [employer]  [phone]; OK to leave message on voicemail  ---------------------------------------------------------------------------  --------------  SCRIPT ANSWERS  COVID Screen: Kristin Pedroza

## 2022-09-20 NOTE — TELEPHONE ENCOUNTER
Patient scheduled for 11/14. Patient would like orders for blood work to be put into the system and would like a call back when ready. [phone].     Please advise

## 2022-10-10 DIAGNOSIS — I10 ESSENTIAL HYPERTENSION: ICD-10-CM

## 2022-10-10 DIAGNOSIS — E78.2 MIXED HYPERLIPIDEMIA: ICD-10-CM

## 2022-10-10 RX ORDER — LISINOPRIL 20 MG/1
TABLET ORAL
Qty: 90 TABLET | Refills: 3 | Status: SHIPPED | OUTPATIENT
Start: 2022-10-10

## 2022-10-10 RX ORDER — SIMVASTATIN 20 MG
TABLET ORAL
Qty: 90 TABLET | Refills: 3 | Status: SHIPPED | OUTPATIENT
Start: 2022-10-10

## 2022-10-10 NOTE — TELEPHONE ENCOUNTER
Medication:   Requested Prescriptions     Pending Prescriptions Disp Refills    simvastatin (ZOCOR) 20 MG tablet 90 tablet 3     Sig: TAKE 1 TABLET BY MOUTH EVERY DAY EVERY NIGHT    lisinopril (PRINIVIL;ZESTRIL) 20 MG tablet 90 tablet 3     Sig: TAKE 1 TABLET BY MOUTH EVERY DAY      Dorene patient. Patient Phone Number: [phone] (home)     Last appt: 10/8/2021   Next appt: 11/14/2022    Last OARRS: No flowsheet data found.   PDMP Monitoring:    Last PDMP Shane Lozada as Reviewed [facility]):  Review User Review Instant Review Result          Preferred Pharmacy:   [facility] [address] [phone]  [address]  [address] [address]  Phone: [phone] Fax: [phone]    [address] [place] [address] Road [phone]  F [phone]  [facility] [address]  Phone: [phone] Fax: [phone]

## 2022-11-01 ENCOUNTER — TELEPHONE (OUTPATIENT)
Dept: FAMILY MEDICINE CLINIC | Age: 87
End: 2022-11-01

## 2022-11-01 NOTE — TELEPHONE ENCOUNTER
----- Message from Tong Betancourt sent at 11/1/2022 11:43 AM EDT -----  Subject: Message to Provider    QUESTIONS  Information for Provider? Pt calling as she has an appt on 11/14 and needs   lab orders faxed to [facility] on [address]. Please call when orders have   been sent over so pt can have blood work done for appt.  ---------------------------------------------------------------------------  --------------  Jaspreet HAMPTON  [phone]; OK to leave message on voicemail  ---------------------------------------------------------------------------  --------------  SCRIPT ANSWERS  Relationship to Patient?  Self

## 2022-11-05 LAB
A/G RATIO: 1.7 (ref 1.2–2.2)
ALBUMIN SERPL-MCNC: 4.2 G/DL (ref 3.6–4.6)
ALP BLD-CCNC: 51 IU/L (ref 44–121)
ALT SERPL-CCNC: 10 IU/L (ref 0–32)
AST SERPL-CCNC: 19 IU/L (ref 0–40)
BILIRUB SERPL-MCNC: 0.4 MG/DL (ref 0–1.2)
BUN / CREAT RATIO: 13 (ref 12–28)
BUN BLDV-MCNC: 13 MG/DL (ref 8–27)
CALCIUM SERPL-MCNC: 9.4 MG/DL (ref 8.7–10.3)
CHLORIDE BLD-SCNC: 105 MMOL/L (ref 96–106)
CHOLESTEROL, TOTAL: 179 MG/DL (ref 100–199)
CO2: 23 MMOL/L (ref 20–29)
COMMENT: NORMAL
CREAT SERPL-MCNC: 0.97 MG/DL (ref 0.57–1)
ESTIMATED GLOMERULAR FILTRATION RATE CREATININE EQUATION: 56 ML/MIN/1.73
GLOBULIN: 2.5 G/DL (ref 1.5–4.5)
GLUCOSE BLD-MCNC: 90 MG/DL (ref 70–99)
HDLC SERPL-MCNC: 68 MG/DL
LDL CHOLESTEROL CALCULATED: 90 MG/DL (ref 0–99)
POTASSIUM SERPL-SCNC: 4.8 MMOL/L (ref 3.5–5.2)
SODIUM BLD-SCNC: 142 MMOL/L (ref 134–144)
TOTAL PROTEIN: 6.7 G/DL (ref 6–8.5)
TRIGL SERPL-MCNC: 122 MG/DL (ref 0–149)
VLDLC SERPL CALC-MCNC: 21 MG/DL (ref 5–40)

## 2022-11-14 ENCOUNTER — OFFICE VISIT (OUTPATIENT)
Dept: FAMILY MEDICINE CLINIC | Age: 87
End: 2022-11-14
Payer: MEDICARE

## 2022-11-14 VITALS
OXYGEN SATURATION: 79 % | TEMPERATURE: 97.2 F | SYSTOLIC BLOOD PRESSURE: 108 MMHG | HEIGHT: 65 IN | HEART RATE: 73 BPM | BODY MASS INDEX: 21.66 KG/M2 | WEIGHT: 130 LBS | DIASTOLIC BLOOD PRESSURE: 54 MMHG

## 2022-11-14 DIAGNOSIS — I10 ESSENTIAL HYPERTENSION: ICD-10-CM

## 2022-11-14 DIAGNOSIS — R56.9 SEIZURE (HCC): ICD-10-CM

## 2022-11-14 DIAGNOSIS — Z00.00 WELL ADULT EXAM: Primary | ICD-10-CM

## 2022-11-14 DIAGNOSIS — N18.31 STAGE 3A CHRONIC KIDNEY DISEASE (HCC): ICD-10-CM

## 2022-11-14 DIAGNOSIS — M81.0 AGE RELATED OSTEOPOROSIS, UNSPECIFIED PATHOLOGICAL FRACTURE PRESENCE: ICD-10-CM

## 2022-11-14 PROCEDURE — 1123F ACP DISCUSS/DSCN MKR DOCD: CPT | Performed by: FAMILY MEDICINE

## 2022-11-14 PROCEDURE — 99214 OFFICE O/P EST MOD 30 MIN: CPT | Performed by: FAMILY MEDICINE

## 2022-11-14 PROCEDURE — G0439 PPPS, SUBSEQ VISIT: HCPCS | Performed by: FAMILY MEDICINE

## 2022-11-14 ASSESSMENT — PATIENT HEALTH QUESTIONNAIRE - PHQ9
SUM OF ALL RESPONSES TO PHQ QUESTIONS 1-9: 1
1. LITTLE INTEREST OR PLEASURE IN DOING THINGS: 0
2. FEELING DOWN, DEPRESSED OR HOPELESS: 1
SUM OF ALL RESPONSES TO PHQ QUESTIONS 1-9: 1
SUM OF ALL RESPONSES TO PHQ QUESTIONS 1-9: 1
SUM OF ALL RESPONSES TO PHQ9 QUESTIONS 1 & 2: 1
SUM OF ALL RESPONSES TO PHQ QUESTIONS 1-9: 1

## 2022-11-14 NOTE — PROGRESS NOTES
[facility] VISIT    Patient is here for their Medicare Annual Wellness Visit and f/u HTN. Former pt Zakiya Phan, here to establish with me. Last eye exam: May 2022  Last dental exam: last month  Exercise: walking 45 mins daily, yard work  Diet: in general, a \"healthy\" diet  , on average, 2 meals per day    How would you rate your overall health? : Very good        Fall Risk 11/14/2022 10/8/2021 6/9/2020 4/23/2019 4/6/2018   2 or more falls in past year? no no no no no   Fall with injury in past year? no no no no no       PHQ Scores 11/14/2022 10/8/2021 6/9/2020 2/7/2019 4/6/2018   PHQ2 Score 1 2 2 2 2   PHQ9 Score 1 2 2 2 2       Do you always wear a seat belt in the car?: Yes      Have you noted any problems with hearing?: No  Have you noted any vision problems?: No  Do you have concerns about your sexual health?: no  In the past month how much has pain been an issue for you?:  Not at all  In the past month have you had issues with anxiety, loneliness, irritability or fatigue:  Not at all    Do you take opioid medications even sometimes? No     Living Will: trust,   Copy requested      Healthcare Decision Maker:    Primary Decision Maker: Rodney Mckenzie    Secondary Decision Maker: Christiano Puckett  Click here to complete Healthcare Decision Makers including selection of the Healthcare Decision Maker Relationship (ie \"Primary\"). Today we documented Decision Maker(s). The patient will provide ACP documents. Who lives at home with you: no one  Do you have any pets? none  Do you have any services coming to your home (meals on wheels, home health, etc) ? : no      Do you need help with:  Using the phone:  No  Bathing: No  Dressing:  No  Toileting: No  Transportation:  No  Shopping: No  Preparing meals: No  Housework/Laundry: No  Medications: No  Money management: No    Does your home have:  Unsecured throw rugs: No  Grab bars in bathroom: No  Walk in shower: Yes  Seat in shower: Yes  Lit pathways for night (nightlights): Yes    Memory:  Have you or anyone close to you expressed concerns about your memory: No    Knows:  Month: Yes  Day: Yes  Year: Yes  Day of Week: Yes  Able to Recall (tree, fork, ball) : Yes    Patient history:   Patient's medications, allergies, past medical, surgical, social and family histories were reviewed and updated in the EHR under History. Social History     Substance and Sexual Activity   Alcohol Use No       Social History     Substance and Sexual Activity   Drug Use No       Social History     Tobacco Use   Smoking Status Never   Smokeless Tobacco Never           Care Team:  Patient's list of care team members was updated in EHR under the Snap Shot. Immunizations: Reviewed with patient. Declines all vaccines    Health Maintenance Due   Topic Date Due    COVID-19 Vaccine (1) Never done    Shingles vaccine (1 of 2) Never done    DTaP/Tdap/Td vaccine (1 - Tdap) 11/21/2008    Flu vaccine (1) 08/01/2022       CHRONIC CONDITIONS / ACUTE COMPLAINTS      Margarita Mccoy is a 80 y.o. female who presents for follow-up of HTN. Checks BP at home: No  BP numbers: NA  Taking medication daily: Yes (lisinopril)  Side Effects of medication: no      Chol - taking simvastatin regularly, no SE    Osteoporosis - prolia ordered last year, pt never received. Feels she is ok and doesn't feel needs tx          Physical Exam:    Body mass index is 21.63 kg/m². Vitals:    11/14/22 0918   BP: (!) 108/54   Site: Left Upper Arm   Position: Sitting   Cuff Size: Medium Adult   Pulse: 73   Temp: 97.2 °F (36.2 °C)   TempSrc: Infrared   SpO2: (!) 79%   Weight: 130 lb (59 kg)   Height: 5' 5\" (1.651 m)     Wt Readings from Last 3 Encounters:   11/14/22 130 lb (59 kg)   10/08/21 130 lb (59 kg)   11/04/19 129 lb (58.5 kg)       GENERAL:Alert and oriented x 4 NAD, affect appropriate and normal appearing weight, well hydrated, well developed.   LUNG:clear to auscultation bilaterally with normal respiratory effort  CV: Normal heart sounds, regular rate and rhythm without murmurs  EXTREMETY: no loss of hair, no edema, normal pedal pulses bilaterally    Was the timed get up and go unsteady or longer than 20 seconds: No      Assessment/Plan:    Fredi Meyer was seen today for medicare awv. Diagnoses and all orders for this visit:    Well adult exam  Recommended screenings discussed and ordered if patient agreed  Recommended vaccinations discussed and ordered if patient agreed  Encouraged healthy diet   Encouraged regular exercise and maintaining a healthy weight    Medicare Safety Interventions: Home safety tips provided  Individualized  [address] included in patient instructions and AVS    Essential hypertension  Low today, monitor at home and send readings in a month  May need to reduce meds  Reviewed pre-visit labs with patient. (Lipid and CMP) Review of these labs contributed to MDM. Stage 3a chronic kidney disease (HCC)  Stable, monitor    Age related osteoporosis, unspecified pathological fracture presence  Declines tx at this time    Seizure [facility])  None in years, follows with neurology          Return in about 1 year (around 11/14/2023) for AWV, 30 min.            Portions of Note per  TATY Gibson with corrections and edits per Jean Carlos Becker MD.  I agree with entirety of note and was present and performed history and physical.  I also confirm that the note above accurately reflects all work, treatment, procedures, and medical decision making performed by me, Jean Carlos Becker MD

## 2022-11-14 NOTE — PATIENT INSTRUCTIONS
--Bring in copy of living will and healthcare power of  for your chart. --Monitor blood pressure 2-3 times a week and drop off readings for review in 1 month. Goal BP is <140/80 for many people, good control is <130/80    Need a new cuff?  Check this website to make sure your BP cuff has been validated for accuracy:    www.validatebp.org

## 2023-10-05 DIAGNOSIS — I10 ESSENTIAL HYPERTENSION: ICD-10-CM

## 2023-10-05 RX ORDER — LISINOPRIL 20 MG/1
TABLET ORAL
Qty: 90 TABLET | Refills: 0 | Status: SHIPPED | OUTPATIENT
Start: 2023-10-05

## 2023-10-09 NOTE — PROGRESS NOTES
Patient Name: Agnieszka Rizzo  : 1930    MRN: [id number]                              Today's Date: 10/9/2023       Admit Date: 10/4/2023    Visit Dx:     ICD-10-CM ICD-9-CM   1. Acute on chronic diastolic congestive heart failure  I50.33 428.33     428.0     Patient Active Problem List   Diagnosis    Primary hypertension    Chronic coronary artery disease    Familial hypercholesterolemia    Mitral valve insufficiency    Ventricular premature beats    Ventricular tachycardia    Acute on chronic respiratory failure with hypoxia    Acid-fast bacteria present    DNR (do not resuscitate)    Chronic diastolic CHF (congestive heart failure)    Asymptomatic bacteriuria    Iron deficiency anemia    Hypokalemia    Bronchiectasis    Pneumonia of both lungs due to infectious organism    KINA (mycobacterium avium-intracellulare)    Paroxysmal atrial fibrillation    Anxiety    Exposure to hepatitis A    Medicare annual wellness visit, subsequent    Abdominal pain    Herpes infection    Moderate asthma with acute exacerbation    Bronchitis, acute, with bronchospasm    Abnormal EKG    Fall    Laceration of left upper extremity    Multiple skin tears    Alteration in anticoagulation    Blind right eye    Clinical diagnosis of COVID-19    Pneumonia of right lung due to infectious organism, unspecified part of lung    COPD (chronic obstructive pulmonary disease)    Dizziness    Bronchiectasis    Sepsis due to pneumonia    Acute pulmonary edema    Electrolyte imbalance    Acute on chronic diastolic congestive heart failure    Acute on chronic diastolic heart failure     Past Medical History:   Diagnosis Date    Aspergillus     Asthma     Atrial fibrillation     chronic    Atrial flutter     Bronchiectasis     C. difficile diarrhea 2017    CAD (coronary artery disease)     nonobstructive    Chronic diastolic CHF (congestive heart failure)     Colitis     Cough     Cryoglobulinemia     Dyspnea on exertion     Fall      History Narrative      None      Current Outpatient Medications:  levETIRAcetam (KEPPRA) 250 MG tablet, Take 250 mg by mouth One tab in a.m. And 2 tabs at bedtime, Disp: , Rfl:   simvastatin (ZOCOR) 10 MG tablet, Take 1 tablet by mouth nightly, Disp: 30 tablet, Rfl: 5  lisinopril (PRINIVIL;ZESTRIL) 20 MG tablet, Take 1 tablet by mouth daily (Patient taking differently: Take 20 mg by mouth nightly ), Disp: 30 tablet, Rfl: 5  Cholecalciferol (VITAMIN D3) 1000 units TABS, Take by mouth nightly , Disp: , Rfl:   Magnesium Hydroxide (MAGNESIA PO), Take 500 mg by mouth nightly , Disp: , Rfl:   aspirin 81 MG tablet, Take 81 mg by mouth nightly , Disp: , Rfl:     No current facility-administered medications for this visit. -- Oxycodone -- Other (See Comments)    --  Depression   -- Norvasc [Amlodipine Besylate] -- Anxiety    VITAL SIGNS:  /60   Pulse 94   Ht 5' 6\" (1.676 m)   Wt 130 lb (59 kg)   BMI 20.98 kg/m²   We discussed the risks, benefits, and alternatives to surgery, including not limited to infection, stiffness, DVT, neural and vascular damage, fractures, dislocations, leg length discrepancy, and others. The patient seems understanding accept these risks. All questions were answered. We will see her next week for surgery and then about 2 weeks postop in the office. Hyperlipidemia     Hypertension     Hyponatremia     Hypoxia     Infectious viral hepatitis     AGE 13    Left shoulder pain     Leg swelling     Lesion of lung     Malignant hyperthermia due to anesthesia     Mild tricuspid regurgitation     MR (mitral regurgitation)     mild    MVP (mitral valve prolapse)     Permanent atrial fibrillation     Pneumonia with the fungal infection aspergillosis 01/06/2017    Pneumothorax     SOB (shortness of breath)     UTI (urinary tract infection)     Wheeze     mild     Past Surgical History:   Procedure Laterality Date    BRONCHOSCOPY N/A 11/12/2016    Procedure: BRONCHOSCOPY WITH FLUORO, BRUSHINGS, BAL, AND BIOPSIES;  Surgeon: Rogelio Tucker MD;  Location: [facility] ENDOSCOPY;  Service:     BRONCHOSCOPY Bilateral 06/03/2017    Procedure: BRONCHOSCOPY with BAL ;  Surgeon: Sung King MD;  Location: [facility] ENDOSCOPY;  Service:     BRONCHOSCOPY N/A 12/17/2019    Procedure: BRONCHOSCOPY WITH WASHINGS;  Surgeon: Rogelio Tucker MD;  Location: [facility] ENDOSCOPY;  Service: Pulmonary    BRONCHOSCOPY N/A 07/15/2022    Procedure: BRONCHOSCOPY;  Surgeon: Rogelio Tucker MD;  Location: [facility] ENDOSCOPY;  Service: Pulmonary;  Laterality: N/A;  Pre: Pneumonia  Post: Pneumonia    BRONCHOSCOPY N/A 10/2/2023    Procedure: BRONCHOSCOPY WITH BRONCHIAL AVEOLAR LAVAGE;  Surgeon: Rogelio Tucker MD;  Location: [facility] ENDOSCOPY;  Service: Pulmonary;  Laterality: N/A;  PRE:BRONCHIECTASIS /   POST: SAME    CATARACT EXTRACTION EXTRACAPSULAR W/ INTRAOCULAR LENS IMPLANTATION      COLONOSCOPY      2013    D & C WITH SUCTION      HYSTERECTOMY      KNEE ARTHROSCOPY Left     Partial      General Information       Row Name 10/09/23 1452          Physical Therapy Time and Intention    Document Type therapy note (daily note) (P)   -KERON     Mode of Treatment physical therapy (P)   -KERON       Row Name 10/09/23 1452          General Information    Patient Profile Reviewed yes (P)   -KERON     Existing Precautions/Restrictions oxygen  therapy device and L/min (P)   -       Row Name 10/09/23 1452          Cognition    Orientation Status (Cognition) oriented x 3 (P)   -       Row Name 10/09/23 1452          Safety Issues, Functional Mobility    Impairments Affecting Function (Mobility) endurance/activity tolerance;strength;shortness of breath (P)   -               User Key  (r) = Recorded By, (t) = Taken By, (c) = Cosigned By      Initials Name Provider Type    Jyoti Omer, PT Student PT Student                   Mobility       Row Name 10/09/23 1453          Bed Mobility    Comment, (Bed Mobility) Pt in chair upon entering room. (P)   -       Row Name 10/09/23 1453          Gait/Stairs (Locomotion)    [place] Level (Gait) contact guard (P)   -     Distance in Feet (Gait) 120 ft (P)   -     Deviations/Abnormal Patterns (Gait) gait speed decreased;stride length decreased (P)   -     Comment, (Gait/Stairs) Pt able to ambulate 120 ft with 1 standing rest break taken. Pt had no LOB and did not use an AD. (P)   -               User Key  (r) = Recorded By, (t) = Taken By, (c) = Cosigned By      Initials Name Provider Type    Jyoti Omer, PT Student PT Student                   Obj/Interventions       Row Name 10/09/23 1453          Balance    Balance Assessment sitting static balance;sitting dynamic balance;standing static balance;standing dynamic balance (P)   -     Static Sitting Balance independent (P)   -     Dynamic Sitting Balance supervision (P)   -     Position, Sitting Balance sitting in chair (P)   -     Static Standing Balance standby assist (P)   -     Dynamic Standing Balance contact guard (P)   -     Position/Device Used, Standing Balance unsupported (P)   -     Comment, Balance Pt demonstrated decreased gait speed and slight unsteadiness but had no LOB or major balance concerns throughout. Pt able to ambulate with no AD and CGA. (P)   -               User Key  (r) = Recorded By, (t) = Taken  By, (c) = Cosigned By      Initials Name Provider Type    Jyoti Omer, PT Student PT Student                   Goals/Plan    No documentation.                  Clinical Impression       Row Name 10/09/23 1456          Pain    Pretreatment Pain Rating 0/10 - no pain (P)   -KERON     Posttreatment Pain Rating 0/10 - no pain (P)   -KERON       Row Name 10/09/23 1456          Plan of Care Review    Plan of Care Reviewed With patient;family (P)   -KERON     Outcome Evaluation Pt pleasant and agreeable to PT this PM. Pt sitting in chair upon entering room, eager to ambulate. STS completed with SBA, pt steady without need for AD support. Pt ambulated 120 ft with CGA and 1 standing rest break taken, increased O2 supply from 2L to 4L [facility] through walk d/t SpO2 readings in high 70s however pt reported only mild shortness of breath and did not appear to be in any distress. Pt placed back in chair following treatment, placed back on 2L O2. SpO2 rechecked in sitting, initially in high 70s but suspected sensor was reading inaccurately. Replaced SpO2 sensor, SpO2 high 90s. Pt reported no pain prior to or following treatment. PT will continue to progress activity as tolerated by pt. (P)   -KERON       Row Name 10/09/23 1456          Positioning and Restraints    Pre-Treatment Position sitting in chair/recliner (P)   -     Post Treatment Position chair (P)   -KH     In Chair reclined;call light within reach;encouraged to call for assist;with family/caregiver (P)   -KERON               User Key  (r) = Recorded By, (t) = Taken By, (c) = Cosigned By      Initials Name Provider Type    Jyoti Omer, PT Student PT Student                   Outcome Measures       Row Name 10/09/23 1501 10/09/23 0838       How much help from another person do you currently need...    Turning from your back to your side while in flat bed without using bedrails? 4 (P)   -KERON 4  -HD    Moving from lying on back to sitting on the side of a flat bed without  bedrails? 4 (P)   -KH 4  -HD    Moving to and from a bed to a chair (including a wheelchair)? 4 (P)   -KH 4  -HD    Standing up from a chair using your arms (e.g., wheelchair, bedside chair)? 4 (P)   -KH 4  -HD    Climbing 3-5 steps with a railing? 3 (P)   -KH 3  -HD    To walk in hospital room? 3 (P)   -KH 3  -HD    AM-PAC 6 Clicks Score (PT) 22 (P)   -KH 22  -HD    Highest level of mobility 7 --> Walked 25 feet or more (P)   -KH 7 --> Walked 25 feet or more  -HD              User Key  (r) = Recorded By, (t) = Taken By, (c) = Cosigned By      Initials Name Provider Type    Natasha Coleman, RN Registered Nurse    Jyoti Omer, PT Student PT Student                                 Physical Therapy Education       Title: PT OT SLP Therapies (Done)       Topic: Physical Therapy (Done)       Point: Mobility training (Done)       Learning Progress Summary             Patient Acceptance, E, VU,DU by  at 10/9/2023 1501    Acceptance, E,D, VU,NR by MS at 10/8/2023 1537    Acceptance, E, VU by  at 10/6/2023 1716    Acceptance, E,D, VU,NR by MS at 10/6/2023 1537                         Point: Home exercise program (Done)       Learning Progress Summary             Patient Acceptance, E,D, VU,NR by MS at 10/8/2023 1537                         Point: Body mechanics (Done)       Learning Progress Summary             Patient Acceptance, E, VU,DU by  at 10/9/2023 1501    Acceptance, E,D, VU,NR by MS at 10/8/2023 1537    Acceptance, E,D, VU,NR by MS at 10/6/2023 1537                         Point: Precautions (Done)       Learning Progress Summary             Patient Acceptance, E, VU,DU by  at 10/9/2023 1501    Acceptance, E,D, VU,NR by MS at 10/8/2023 1537    Acceptance, E,D, VU,NR by MS at 10/6/2023 1537                                         User Key       Initials Effective Dates Name Provider Type Discipline    MS 06/16/21 -  Karel Daniels, PT Physical Therapist PT    HD 01/03/21 -  Natasha Abarca, DOLORES  Registered Nurse Nurse     08/10/23 -  Jyoti Saunders, PT Student PT Student PT                  PT Recommendation and Plan     Plan of Care Reviewed With: (P) patient, family  Outcome Evaluation: (P) Pt pleasant and agreeable to PT this PM. Pt sitting in chair upon entering room, eager to ambulate. STS completed with SBA, pt steady without need for AD support. Pt ambulated 120 ft with CGA and 1 standing rest break taken, increased O2 supply from 2L to 4L [facility] through walk d/t SpO2 readings in high 70s however pt reported only mild shortness of breath and did not appear to be in any distress. Pt placed back in chair following treatment, placed back on 2L O2. SpO2 rechecked in sitting, initially in high 70s but suspected sensor was reading inaccurately. Replaced SpO2 sensor, SpO2 high 90s. Pt reported no pain prior to or following treatment. PT will continue to progress activity as tolerated by pt.     Time Calculation:         PT Charges       Row Name 10/09/23 1501             Time Calculation    Start Time 1350 (P)   -      Stop Time 1404 (P)   -      Time Calculation (min) 14 min (P)   -      PT Received On 10/09/23 (P)   -      PT - Next Appointment 10/10/23 (P)   -         Time Calculation- PT    Total Timed Code Minutes- PT 14 minute(s) (P)   -         Timed Charges    90057 - Gait Training Minutes  14 (P)   -         Total Minutes    Timed Charges Total Minutes 14 (P)   -       Total Minutes 14 (P)   -                User Key  (r) = Recorded By, (t) = Taken By, (c) = Cosigned By      Initials Name Provider Type    Jyoti Omer, PT Student PT Student                  Therapy Charges for Today       Code Description Service Date Service Provider Modifiers Qty    69096905879 HC GAIT TRAINING EA 15 MIN 10/9/2023 Jyoti Saunders, PT Student GP 1            PT G-Codes  Outcome Measure Options: AM-PAC 6 Clicks Basic Mobility (PT)  AM-PAC 6 Clicks Score (PT): (P) 22       Jyoti Saunders, PT  Student  10/9/2023

## 2023-12-04 ENCOUNTER — TELEPHONE (OUTPATIENT)
Dept: FAMILY MEDICINE CLINIC | Age: 88
End: 2023-12-04

## 2023-12-04 DIAGNOSIS — R56.9 SEIZURE (HCC): ICD-10-CM

## 2023-12-04 DIAGNOSIS — I10 ESSENTIAL HYPERTENSION: Primary | ICD-10-CM

## 2023-12-04 NOTE — TELEPHONE ENCOUNTER
----- Message from Jerry King sent at 12/4/2023  9:23 AM EST -----  Subject: Referral Request    Reason for referral request? patient requesting labs  Provider patient wants to be referred to(if known):     Provider Phone Number(if known):     Additional Information for Provider?   ---------------------------------------------------------------------------  --------------  [address]    [phone]; OK to leave message on voicemail  ---------------------------------------------------------------------------  --------------

## 2023-12-11 ENCOUNTER — OFFICE VISIT (OUTPATIENT)
Dept: ORTHOPEDIC SURGERY | Age: 88
End: 2023-12-11
Payer: MEDICARE

## 2023-12-11 VITALS — HEIGHT: 65 IN | WEIGHT: 130 LBS | BODY MASS INDEX: 21.66 KG/M2 | RESPIRATION RATE: 16 BRPM

## 2023-12-11 DIAGNOSIS — M18.11 ARTHRITIS OF CARPOMETACARPAL (CMC) JOINT OF RIGHT THUMB: ICD-10-CM

## 2023-12-11 DIAGNOSIS — M79.641 PAIN OF RIGHT HAND: Primary | ICD-10-CM

## 2023-12-11 PROCEDURE — 99203 OFFICE O/P NEW LOW 30 MIN: CPT | Performed by: ORTHOPAEDIC SURGERY

## 2023-12-11 NOTE — PATIENT INSTRUCTIONS
Thank you for choosing [address] [facility] for your Hand and Upper Extremity needs. If we can be of any further assistance to you, please do not hesitate to contact us.     Office Phone Number:  [phone]  or  [phone]

## 2023-12-11 NOTE — PROGRESS NOTES
reveals normal and good capillary refill bilaterally  Swelling/enlargement is moderate in the base of the thumb bilaterally  NO  pain is elicited with palpation of the thumb CMC joint on the right . There is marked adduction contracture of the Thumb metacarpal with 50* hyperextension of the metacarpo-phalangeal joint and mild compensatory flexion of the interphalangeal joint . The remainder of the hands & wrists are not tender to palpation. Muscular strength is clinically appropriate bilaterally. Radiographic Evaluation:  Radiographs, taken In My Office were Personally Reviewed & Interpreted by myself today (3 views of the right hand). They demonstrate no evidence of an acute fracture. There is severe osteoarthritis of the first [facility] Joint with marked joint narrowing and moderate osteophyte formation, & 50 degrees of MP joint hyper-extension. There is not evidence of other injury or bony fracture. Impression:  Ms. 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 Plan:    I have had a thorough discussion with Ms. Diogo Ibrahim regarding the treatment options available for her initially presenting right Thumb Basilar Joint osteoarthritis, which is causing her significant concern regarding her deformity and functional limitation. I have outlined for Ms. Diogo Ibrahim the risk, benefits and consequences of the various treatment modalities, including a reasonable expectation for the long term success of each. Based upon our current discussion and a reasonable understating of the options available to her, Ms. Diogo Ibrahim has selected to proceed with a conservative plan of treatment consisting of: the use of protective splints, activity modification, and the judicious use of over-the-counter anti-inflammatory medications if

## 2023-12-13 DIAGNOSIS — I10 ESSENTIAL HYPERTENSION: ICD-10-CM

## 2023-12-13 DIAGNOSIS — R56.9 SEIZURE (HCC): ICD-10-CM

## 2023-12-13 LAB
ALBUMIN SERPL-MCNC: 4.3 G/DL (ref 3.4–5)
ALBUMIN/GLOB SERPL: 1.7 {RATIO} (ref 1.1–2.2)
ALP SERPL-CCNC: 50 U/L (ref 40–129)
ALT SERPL-CCNC: 11 U/L (ref 10–40)
ANION GAP SERPL CALCULATED.3IONS-SCNC: 13 MMOL/L (ref 3–16)
AST SERPL-CCNC: 18 U/L (ref 15–37)
BILIRUB SERPL-MCNC: 0.4 MG/DL (ref 0–1)
BUN SERPL-MCNC: 14 MG/DL (ref 7–20)
CALCIUM SERPL-MCNC: 9.3 MG/DL (ref 8.3–10.6)
CHLORIDE SERPL-SCNC: 103 MMOL/L (ref 99–110)
CHOLEST SERPL-MCNC: 250 MG/DL (ref 0–199)
CO2 SERPL-SCNC: 27 MMOL/L (ref 21–32)
CREAT SERPL-MCNC: 0.7 MG/DL (ref 0.6–1.2)
DEPRECATED RDW RBC AUTO: 14.7 % (ref 12.4–15.4)
GFR SERPLBLD CREATININE-BSD FMLA CKD-EPI: >60 ML/MIN/{1.73_M2}
GLUCOSE SERPL-MCNC: 89 MG/DL (ref 70–99)
HCT VFR BLD AUTO: 40.3 % (ref 36–48)
HDLC SERPL-MCNC: 80 MG/DL (ref 40–60)
HGB BLD-MCNC: 13.4 G/DL (ref 12–16)
LDLC SERPL CALC-MCNC: 148 MG/DL
MCH RBC QN AUTO: 29.6 PG (ref 26–34)
MCHC RBC AUTO-ENTMCNC: 33.2 G/DL (ref 31–36)
MCV RBC AUTO: 89.2 FL (ref 80–100)
PLATELET # BLD AUTO: 260 K/UL (ref 135–450)
PMV BLD AUTO: 8.8 FL (ref 5–10.5)
POTASSIUM SERPL-SCNC: 4.9 MMOL/L (ref 3.5–5.1)
PROT SERPL-MCNC: 6.9 G/DL (ref 6.4–8.2)
RBC # BLD AUTO: 4.52 M/UL (ref 4–5.2)
SODIUM SERPL-SCNC: 143 MMOL/L (ref 136–145)
TRIGL SERPL-MCNC: 110 MG/DL (ref 0–150)
VLDLC SERPL CALC-MCNC: 22 MG/DL
WBC # BLD AUTO: 5.3 K/UL (ref 4–11)

## 2024-01-05 DIAGNOSIS — I10 ESSENTIAL HYPERTENSION: ICD-10-CM

## 2024-01-05 RX ORDER — LISINOPRIL 20 MG/1
TABLET ORAL
Qty: 90 TABLET | Refills: 0 | Status: SHIPPED | OUTPATIENT
Start: 2024-01-05

## 2024-01-05 NOTE — TELEPHONE ENCOUNTER
Medication:   Requested Prescriptions     Pending Prescriptions Disp Refills    lisinopril (PRINIVIL;ZESTRIL) 20 MG tablet [Pharmacy Med Name: LISINOPRIL 20 MG TABLET] 90 tablet 0     Sig: TAKE 1 TABLET BY MOUTH EVERY DAY          Patient Phone Number: [phone] (home)     Last appt: 11/14/2022   Next appt: 1/8/2024    Last OARRS:        No data to display              PDMP Monitoring:    Last PDMP [place] as Reviewed ([place]):  Review User Review Instant Review Result          Preferred Pharmacy:   [facility]/pharmacy #[id number] - [place], [place] - [address] [address] [phone] - F [phone]  [address] [facility] [address]  Phone: [phone] Fax: [phone]    [address] -  [phone] - F [phone]  [address] [facility] [address]  Phone: [phone] Fax: [phone]

## 2024-01-05 NOTE — PROGRESS NOTES
MEDICARE ANNUAL WELLNESS VISIT    Patient is here for their Medicare Annual Wellness Visit     Last eye exam: March or April 2023  Last dental exam: goes twice a year, up to date  Exercise:  daily, balance exercises and walking  Do you eat balanced/healthy meals regularly? Yes    How would you rate your overall health? : Very good            1/8/2024    11:03 AM 11/14/2022     9:17 AM 10/8/2021    11:14 AM 6/9/2020     1:10 PM 4/23/2019     1:27 PM 4/6/2018     9:10 AM   Fall Risk   2 or more falls in past year? no no no no no no   Fall with injury in past year? no no no no no no           1/8/2024    11:04 AM 11/14/2022     9:17 AM 10/8/2021    11:14 AM 6/9/2020     1:10 PM 2/7/2019     3:58 PM 4/6/2018     9:07 AM   PHQ Scores   PHQ2 Score 0 1 2 2 2 2   PHQ9 Score 0 1 2 2 2 2         Do you always wear a seat belt in the car?: Yes      Have you noted any problems with hearing?: No  Have you noted any vision problems?: No  Do you have concerns about your sexual health?: no  In the past month how much has pain been an issue for you?:  Not at all  In the past month have you had issues with anxiety, loneliness, irritability or fatigue:  Not at all    Do you take opioid medications even sometimes? No     Living Will and/or Healthcare POA: Yes,   Copy not on file, but feels we don't need copy      Healthcare Decision Maker:    Primary Decision Maker: Byron Puckett    Secondary Decision Maker: Christiano Puckett    Supplemental (Other) Decision Maker: Laura Guzman - [phone]  Click here to complete Healthcare Decision Makers including selection of the Healthcare Decision Maker Relationship (ie \"Primary\").         Who lives at home with you: no one  Do you have any pets? none  Do you have any services coming to your home (meals on wheels, home health, etc) ?: no      Do you need help with:  Using the phone:  No  Bathing: No  Dressing:  No  Toileting: No  Transportation:  No  Shopping: No  Preparing

## 2024-01-08 ENCOUNTER — OFFICE VISIT (OUTPATIENT)
Dept: FAMILY MEDICINE CLINIC | Age: 89
End: 2024-01-08
Payer: MEDICARE

## 2024-01-08 VITALS
HEART RATE: 76 BPM | BODY MASS INDEX: 21.7 KG/M2 | TEMPERATURE: 97.2 F | DIASTOLIC BLOOD PRESSURE: 58 MMHG | OXYGEN SATURATION: 99 % | SYSTOLIC BLOOD PRESSURE: 138 MMHG | WEIGHT: 130.4 LBS

## 2024-01-08 DIAGNOSIS — R56.9 SEIZURE (HCC): ICD-10-CM

## 2024-01-08 DIAGNOSIS — N18.31 STAGE 3A CHRONIC KIDNEY DISEASE (HCC): ICD-10-CM

## 2024-01-08 DIAGNOSIS — Z00.00 WELL ADULT EXAM: Primary | ICD-10-CM

## 2024-01-08 DIAGNOSIS — I10 ESSENTIAL HYPERTENSION: Chronic | ICD-10-CM

## 2024-01-08 PROCEDURE — 99213 OFFICE O/P EST LOW 20 MIN: CPT | Performed by: FAMILY MEDICINE

## 2024-01-08 PROCEDURE — 1123F ACP DISCUSS/DSCN MKR DOCD: CPT | Performed by: FAMILY MEDICINE

## 2024-01-08 PROCEDURE — G0439 PPPS, SUBSEQ VISIT: HCPCS | Performed by: FAMILY MEDICINE

## 2024-01-08 RX ORDER — LISINOPRIL 20 MG/1
20 TABLET ORAL DAILY
Qty: 90 TABLET | Refills: 3 | Status: SHIPPED | OUTPATIENT
Start: 2024-01-08

## 2024-01-08 SDOH — ECONOMIC STABILITY: HOUSING INSECURITY
IN THE LAST 12 MONTHS, WAS THERE A TIME WHEN YOU DID NOT HAVE A STEADY PLACE TO SLEEP OR SLEPT IN A SHELTER (INCLUDING NOW)?: NO

## 2024-01-08 SDOH — ECONOMIC STABILITY: INCOME INSECURITY: HOW HARD IS IT FOR YOU TO PAY FOR THE VERY BASICS LIKE FOOD, HOUSING, MEDICAL CARE, AND HEATING?: NOT HARD AT ALL

## 2024-01-08 SDOH — ECONOMIC STABILITY: FOOD INSECURITY: WITHIN THE PAST 12 MONTHS, YOU WORRIED THAT YOUR FOOD WOULD RUN OUT BEFORE YOU GOT MONEY TO BUY MORE.: NEVER TRUE

## 2024-01-08 SDOH — ECONOMIC STABILITY: FOOD INSECURITY: WITHIN THE PAST 12 MONTHS, THE FOOD YOU BOUGHT JUST DIDN'T LAST AND YOU DIDN'T HAVE MONEY TO GET MORE.: NEVER TRUE

## 2024-01-08 ASSESSMENT — PATIENT HEALTH QUESTIONNAIRE - PHQ9
2. FEELING DOWN, DEPRESSED OR HOPELESS: 0
SUM OF ALL RESPONSES TO PHQ QUESTIONS 1-9: 0
1. LITTLE INTEREST OR PLEASURE IN DOING THINGS: 0
SUM OF ALL RESPONSES TO PHQ9 QUESTIONS 1 & 2: 0
SUM OF ALL RESPONSES TO PHQ QUESTIONS 1-9: 0

## 2024-01-08 NOTE — PATIENT INSTRUCTIONS
--Bring in copy of living will and healthcare power of  for your chart.      --Check with pharmacy about getting the shingles vaccine, Shingrix (not Zostavax)      --Check with the pharmacy about getting the Tdap (tetanus, diptheria and pertussis) vaccine.

## 2024-01-15 ENCOUNTER — OFFICE VISIT (OUTPATIENT)
Dept: ORTHOPEDIC SURGERY | Age: 89
End: 2024-01-15
Payer: MEDICARE

## 2024-01-15 DIAGNOSIS — M65.30 TRIGGER FINGER, ACQUIRED: Primary | ICD-10-CM

## 2024-01-15 PROCEDURE — 1123F ACP DISCUSS/DSCN MKR DOCD: CPT | Performed by: ORTHOPAEDIC SURGERY

## 2024-01-15 PROCEDURE — 20550 NJX 1 TENDON SHEATH/LIGAMENT: CPT | Performed by: ORTHOPAEDIC SURGERY

## 2024-01-15 PROCEDURE — 99213 OFFICE O/P EST LOW 20 MIN: CPT | Performed by: ORTHOPAEDIC SURGERY

## 2024-01-15 RX ORDER — TRIAMCINOLONE ACETONIDE 40 MG/ML
20 INJECTION, SUSPENSION INTRA-ARTICULAR; INTRAMUSCULAR ONCE
Status: COMPLETED | OUTPATIENT
Start: 2024-01-15 | End: 2024-01-15

## 2024-01-15 RX ADMIN — TRIAMCINOLONE ACETONIDE 20 MG: 40 INJECTION, SUSPENSION INTRA-ARTICULAR; INTRAMUSCULAR at 15:44

## 2024-01-15 NOTE — PROGRESS NOTES
Administrations This Visit       triamcinolone acetonide (KENALOG-40) injection 20 mg       Admin Date  01/15/2024  15:44 Action  Given Dose  20 mg Route  Intra-artICUlar Site  Finger (See Comments) Administered By  Trice Jackson MA    Ordering Provider: Diallo Yancey MD    NDC: 9159-7053-86    Lot#: [id number]    : [employer] (PRIMARY CARE)    Patient Supplied?: No    Comments: Left Thumb

## 2024-01-16 NOTE — PATIENT INSTRUCTIONS
Information & Instructions   After Finger, Hand, Wrist, or Elbow Injection    Diallo Yancey MD    You have received an injection of local anesthetic (Bupivicaine without Epinephrine) for comfort & a steroid (Kenalog) for it’s strong anti-inflammatory effects. In order to give the medication a chance to reduce your inflammation and discomfort, it is recommended that you take it easy for a day or so.  You may use your hand and arm as you feel comfortable, but you should avoid highly strenuous activity and heavy use for several days.    Relief from the injection will often not begin for several days, and you may not feel full relief for up to one month.      It is not uncommon to experience some local discomfort or pain at or around the injection site for a few days.  To relieve these symptoms you may do the following if you feel necessary:       Apply ice to the affected area 20 minutes on and 20 minutes off.   Do not apply ice directly to the skin. Use a thin layer (T-shirt, pillowcase, towel, etc.) to protect the skin.     - If allowed by your other medical physicians, you may take -     2 Tylenol extra strength tablets every 4-6 hours       1-2 Aleve tablets twice a day     2-3 Advil tablets two to three times a day    If you are diabetic, the steroid medication may increase your blood sugar, so you are advised to monitor your sugar more closely so you can adjust it accordingly for a few days following your injection.  If you need assistance with the control of you blood sugar, please contact you primary care physician for further advice.    I will request that you please call the office one month after your injection at [phone] if you have not experienced relief of your symptoms (unless I have instructed you otherwise).  If your injection has given you good relief of you symptoms as expected, then you only need to call the office if your symptoms return.

## 2024-01-16 NOTE — PROGRESS NOTES
Ms. Manpreet Puckett returns today in follow-up of her previously treated  bilateral Thumb CMC osteoarthritis with axial instability.  She was last seen by me in December, 2023 at which time she was treated with conservative measures and sent to hand therapy for splinting .  She experienced no relief of her initial symptoms.  She  has noticed symptom  over the last several weeks.  She returns today with worsened symptoms of right Thumb catching with flexion at the interphalangeal joint which was not apparent at prior visit, requesting further treatment.  Due to the dynamic and progressive nature of Stenosing Tenosynovitis, It is clinically necessary to carefully re-evaluate Ms. Manpreet Puckett today, prior to proceeding with any further treatment or intervention, to assure the clinical appropriateness of any previously considered treatment, at this time.      I have today reviewed with Manpreet Puckett the clinically relevant, past medical history, medications, allergies,  family history, social history, and Review Of Systems & I have documented any details relevant to today's presenting complaints in my history above.  Ms. Manpreet Puckett's self-reported past medical history, medications, allergies,  family history, social history, and Review Of Systems have been scanned into the chart under the \"Media\" tab.    Physical Exam:     Ms. Manpreet Puckett appears well, she is in no apparent distress, she demonstrates appropriate mood & affect.      Skin: Normal in appearance, Normal Color, and Free of Lesions Bilaterally   Digital range of motion is limited by Osteoarthritis bilaterally.  Active triggering is noted upon flexion in the right Thumb.  There is an associated flexion contracture of the IP joint which is locked in flexion on the left and actively triggering on the right .  No other digit demonstrates evidence for stenosing tenosynovitis bilaterally.  Vascular examination reveals good capillary refill

## 2024-01-29 ENCOUNTER — OFFICE VISIT (OUTPATIENT)
Dept: ORTHOPEDIC SURGERY | Age: 89
End: 2024-01-29
Payer: MEDICARE

## 2024-01-29 VITALS — WEIGHT: 130 LBS | BODY MASS INDEX: 21.66 KG/M2 | HEIGHT: 65 IN

## 2024-01-29 DIAGNOSIS — M54.31 RIGHT SIDED SCIATICA: Primary | ICD-10-CM

## 2024-01-29 DIAGNOSIS — Z96.641 S/P TOTAL RIGHT HIP ARTHROPLASTY: ICD-10-CM

## 2024-01-29 PROCEDURE — 99213 OFFICE O/P EST LOW 20 MIN: CPT | Performed by: PHYSICIAN ASSISTANT

## 2024-01-29 PROCEDURE — 1123F ACP DISCUSS/DSCN MKR DOCD: CPT | Performed by: PHYSICIAN ASSISTANT

## 2024-01-29 NOTE — PROGRESS NOTES
ORTHOPAEDIC NEW PATIENT NOTE    Chief Complaint   Patient presents with    New Patient     Rt Posterior Thigh pain       HPI  1/29/24  89 y.o. female seen for evaluation of right hip pain:  Onset 1 month  Injury/trauma None  History of symptoms: H/o R NE in 2019 by Dr. Torres, uneventful recovery, no issues until recently  Pain is located likely over the posterior buttock  Described as shooting pain that radiates all the way down the back of the leg to the foot  Worse with trying to sleep at night in any position  Better with nothing specifically  Has tried anti-inflammatories with no relief  Back pain = no  Radicular symptoms = yes  Numbness and/or tingling = no      Review of Systems  Constitutional - denies fevers, weight loss  Cardiovascular - denies chest pain, palpitations, peripheral edema, blood clots  Respiratory - denies SOB, cough  Gastrointestinal - denies abdominal pain, nausea, vomiting  Genitourinary - denies dysuria, discharge  Musculoskeletal - per HPI  Integumentary - denies rash, sores  Neurologic - denies numbness, tingling, paresthesias  Hematologic - denies abnormal bleeding, blood clots  Allergic/Immunologic - denies metal allergies, recurrent infections    Allergies   Allergen Reactions    Oxycodone Other (See Comments)     Depression    Norvasc [Amlodipine Besylate] Anxiety        Current Outpatient Medications   Medication Sig Dispense Refill    lisinopril (PRINIVIL;ZESTRIL) 20 MG tablet Take 1 tablet by mouth daily 90 tablet 3    aspirin EC 81 MG EC tablet Take 1 tablet by mouth daily 30 tablet 0    levETIRAcetam (KEPPRA) 250 MG tablet Take 1 tablet by mouth One tab in a.m. And 2 tabs at bedtime      Cholecalciferol (VITAMIN D3) 1000 units TABS Take by mouth nightly       Magnesium Hydroxide (MAGNESIA PO) Take 500 mg by mouth nightly        No current facility-administered medications for this visit.       Past Medical History:   Diagnosis Date    Arthritis     Edema 04/22/2019

## 2024-01-31 ENCOUNTER — TELEPHONE (OUTPATIENT)
Dept: ORTHOPEDIC SURGERY | Age: 89
End: 2024-01-31

## 2024-01-31 DIAGNOSIS — M54.31 RIGHT SIDED SCIATICA: Primary | ICD-10-CM

## 2024-01-31 NOTE — TELEPHONE ENCOUNTER
General Question     Subject: patient called and she stated she need to have her Referral sent over to Dr. Kent so she can get that schedule. She just would like a call back once it sent so she can get schedule. Please Advise.  Patient Italo Manpreet SMITH  Contact Number: [phone]

## 2024-02-23 ENCOUNTER — HOSPITAL ENCOUNTER (OUTPATIENT)
Dept: PHYSICAL THERAPY | Age: 89
Setting detail: THERAPIES SERIES
Discharge: HOME | End: 2024-02-23
Payer: MEDICARE

## 2024-02-23 DIAGNOSIS — R29.898 IMPAIRED FLEXIBILITY OF LOWER EXTREMITY: ICD-10-CM

## 2024-02-23 DIAGNOSIS — M25.651 IMPAIRED RANGE OF MOTION OF BOTH HIPS: ICD-10-CM

## 2024-02-23 DIAGNOSIS — M25.652 IMPAIRED RANGE OF MOTION OF BOTH HIPS: ICD-10-CM

## 2024-02-23 DIAGNOSIS — R29.898 WEAKNESS OF BOTH HIPS: Primary | ICD-10-CM

## 2024-02-23 PROCEDURE — 97161 PT EVAL LOW COMPLEX 20 MIN: CPT

## 2024-02-23 PROCEDURE — 97110 THERAPEUTIC EXERCISES: CPT

## 2024-02-28 ENCOUNTER — HOSPITAL ENCOUNTER (OUTPATIENT)
Dept: PHYSICAL THERAPY | Age: 89
Setting detail: THERAPIES SERIES
Discharge: HOME | End: 2024-02-28
Payer: MEDICARE

## 2024-02-28 PROCEDURE — 97110 THERAPEUTIC EXERCISES: CPT

## 2024-02-28 NOTE — FLOWSHEET NOTE
[facility] - Outpatient Rehabilitation and Therapy [address], [place] [address] office: [phone] fax: [phone]        Physical Therapy: TREATMENT/PROGRESS NOTE   Patient: Manpreet Puckett (89 y.o. female)   Examination Date: 2024   :  1934 MRN: [id number]   Visit #:   Insurance Allowable Auth Needed   [place]  5 V [x]Yes    []No  24 - 24    Insurance: Payor: [facility] MEDICARE / Plan: [employer] MEDIBLUE ESSENTIAL/PLUS / Product Type: *No Product type* /   Insurance ID: [id number] - (Medicare Managed)  Secondary Insurance (if applicable):      Benefits:  -$35 copay  - Auth through [employer]  - No Unattended Estim    Treatment Diagnosis:     ICD-10-CM    1. Weakness of both hips  R29.898       2. Impaired flexibility of lower extremity  R29.898    3.     Impaired range of motion of both hips            M25.561, M25.652          Medical Diagnosis:  No admission diagnoses are documented for this encounter.   Referring Physician: Madhuri Kent, *   Fax: [phone] PCP: Valerie Win MD       Plan of care signed (Y/N): N    Date of Patient follow up with Physician:      Progress Report/POC: NO  POC update due: (10 visits /OR AUTH LIMITS, whichever is less)  3/22/2024                                             Precautions/ Contra-indications:           Latex allergy:  NO  Pacemaker:    NO  Contraindications for Manipulation: NA  Date of Surgery: R NE ; L EN   Other:    Red Flags:  None    C-SSRS Triggered by Intake questionnaire:   [x] No, Questionnaire did not trigger screening.   [] Yes, Patient intake triggered further evaluation      [] C-SSRS Screening completed  [] PCP notified via Plan of Care  [] Emergency services notified     Preferred Language for Healthcare:   [x] English       [] other:    SUBJECTIVE EXAMINATION     Patient stated complaint: Pt reports she is doing well. Has the most pain at night and after resting for a long

## 2024-03-06 ENCOUNTER — HOSPITAL ENCOUNTER (OUTPATIENT)
Dept: PHYSICAL THERAPY | Age: 89
Setting detail: THERAPIES SERIES
Discharge: HOME | End: 2024-03-06
Payer: MEDICARE

## 2024-03-06 PROCEDURE — 97110 THERAPEUTIC EXERCISES: CPT

## 2024-03-06 NOTE — FLOWSHEET NOTE
[facility] - Outpatient Rehabilitation and Therapy [address], [place] [address] office: [phone] fax: [phone]        Physical Therapy: TREATMENT/PROGRESS NOTE   Patient: Manpreet Puckett (89 y.o. female)   Examination Date: 2024   :  1934 MRN: [id number]   Visit #: 3 /5  Insurance Allowable Auth Needed   [place]  5 V [x]Yes    []No  24 - 24    Insurance: Payor: [facility] MEDICARE / Plan: [employer] MEDIBLUE ESSENTIAL/PLUS / Product Type: *No Product type* /   Insurance ID: [id number] - (Medicare Managed)  Secondary Insurance (if applicable):      Benefits:  -$35 copay  - Auth through [employer]  - No Unattended Estim    Treatment Diagnosis:     ICD-10-CM    1. Weakness of both hips  R29.898       2. Impaired flexibility of lower extremity  R29.898    3.     Impaired range of motion of both hips            M25.561, M25.652          Medical Diagnosis:  No admission diagnoses are documented for this encounter.  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   [] Yes, Patient intake triggered further evaluation      [] C-SSRS Screening completed  [] PCP notified via Plan of Care  [] Emergency services notified     Preferred Language for Healthcare:   [x] English       [] other:    SUBJECTIVE EXAMINATION     Patient stated complaint:  Pt reports continued pain with laying

## 2024-03-08 ENCOUNTER — APPOINTMENT (OUTPATIENT)
Dept: PHYSICAL THERAPY | Age: 89
End: 2024-03-08
Payer: MEDICARE

## 2024-03-13 ENCOUNTER — HOSPITAL ENCOUNTER (OUTPATIENT)
Dept: PHYSICAL THERAPY | Age: 89
Setting detail: THERAPIES SERIES
Discharge: HOME | End: 2024-03-13
Payer: MEDICARE

## 2024-03-13 PROCEDURE — 97140 MANUAL THERAPY 1/> REGIONS: CPT

## 2024-03-13 PROCEDURE — 97110 THERAPEUTIC EXERCISES: CPT

## 2024-03-13 NOTE — FLOWSHEET NOTE
(04331)    Dry Needle 1-2 muscle (39433)     Aquatic Therex (44363)    Dry Needle 3+ muscle (84437)     Iontophoresis (23747)    VASO (20170)     Ultrasound (27004)    Group Therapy (57192)     Estim Attended (15263)    Canalith Repositioning (97471)     Other:    Other:    Total Timed Code Tx Minutes 45 3       Total Treatment Minutes 45        Charge Justification:  (17536) THERAPEUTIC EXERCISE - Provided verbal/tactile cueing for activities related to strengthening, flexibility, endurance, ROM performed to prevent loss of range of motion, maintain or improve muscular strength or increase flexibility, following either an injury or surgery.   (38424) MANUAL THERAPY -  Manual therapy techniques, 1 or more regions, each 15 minutes (Mobilization/manipulation, manual lymphatic drainage, manual traction) for the purpose of modulating pain, promoting relaxation,  increasing ROM, reducing/eliminating soft tissue swelling/inflammation/restriction, improving soft tissue extensibility and allowing for proper ROM for normal function with self care, mobility, lifting and ambulation      GOALS     Patient stated goal: reduce pain  Status:  [] Progressing: [] Met: [] Not Met: [] Adjusted    Therapist goals for Patient:   Short Term Goals: To be achieved in: 2 weeks  Independent in HEP and progression per patient tolerance, in order to progress toward full function and prevent re-injury.    Status: [] Progressing: [] Met: [] Not Met: [] Adjusted  Patient will have a decrease in pain to 2/10 to help facilitate improvement in movement, function, and ADLs as indicated by functional deficits.   Status: [] Progressing: [] Met: [] Not Met: [] Adjusted    Long Term Goals: To be achieved in: 4 weeks  Disability index score of 10% or less for the LEFS to assist with return top prior level of function.   Status: [] Progressing: [] Met: [] Not Met: [] Adjusted  Improve hip AROM to 30 degrees or  better for ER bilaterally to allow for proper

## 2024-03-20 ENCOUNTER — HOSPITAL ENCOUNTER (OUTPATIENT)
Dept: PHYSICAL THERAPY | Age: 89
Setting detail: THERAPIES SERIES
Discharge: HOME | End: 2024-03-20
Payer: MEDICARE

## 2024-03-20 PROCEDURE — 97110 THERAPEUTIC EXERCISES: CPT

## 2024-03-20 PROCEDURE — 97140 MANUAL THERAPY 1/> REGIONS: CPT

## 2024-03-20 NOTE — PLAN OF CARE
2/10 to help facilitate improvement in movement, function, and ADLs as indicated by functional deficits.   Status: [] Progressing: [x] Met: [] Not Met: [] Adjusted    Long Term Goals: To be achieved in: 4 weeks  Disability index score of 10% or less for the LEFS to assist with return top prior level of function.   Status: [] Progressing: [] Met: [x] Not Met: [] Adjusted  Improve hip AROM to 30 degrees or  better for ER bilaterally to allow for proper joint functioning and increased piriformis flexibility to reduce tension on sciatic nerve so pt can tolerate prolonged sitting. .  Status: [x] Progressing: [] Met: [] Not Met: [] Adjusted  Patient to demonstrate improved proximal hip strength to at least 4+/5 for improved lumbopelvic stability with prolonged functional movements.      Status: [x] Progressing: all tested mm demoed improvement [] Met: [] Not Met: [] Adjusted  Patient will return to tolerating sitting for at least 30 minutes to be able to socialize with family without increase in pain >1/10.          Status: [x] Progressin min reported [] Met: [] Not Met: [] Adjusted  Patient will report no pain when attempting to fall asleep while in sidelying for 5 consecutive nights to improve sleep hygiene and daily function as well as return to PLOF.               Status: [x] Progressing: [] Met: [] Not Met: [] Adjusted    TREATMENT PLAN     Frequency/Duration: 1-2x/week for 4 weeks for the following treatment interventions:    Interventions:  [x] Therapeutic exercise including: strength training, ROM, including postural re-education.   [x] NMR activation and proprioception, including postural re-education.    [x] Manual therapy as indicated to include: PROM, Gr I-IV mobilizations, and STM  [x] Modalities as needed that may include: Cryotherapy  [x] Patient education on joint protection, postural re-education, activity modification, progression of HEP.        [] Aquatic Therapy    Plan: Cont POC- Continue

## 2024-03-22 ENCOUNTER — APPOINTMENT (OUTPATIENT)
Dept: PHYSICAL THERAPY | Age: 89
End: 2024-03-22
Payer: MEDICARE

## 2024-03-25 ENCOUNTER — APPOINTMENT (OUTPATIENT)
Dept: CT IMAGING | Age: 89
End: 2024-03-25
Payer: MEDICARE

## 2024-03-25 ENCOUNTER — HOSPITAL ENCOUNTER (EMERGENCY)
Age: 89
Discharge: HOME | End: 2024-03-26
Attending: EMERGENCY MEDICINE
Payer: MEDICARE

## 2024-03-25 VITALS
TEMPERATURE: 98.2 F | DIASTOLIC BLOOD PRESSURE: 79 MMHG | RESPIRATION RATE: 16 BRPM | OXYGEN SATURATION: 99 % | SYSTOLIC BLOOD PRESSURE: 165 MMHG | HEART RATE: 72 BPM

## 2024-03-25 DIAGNOSIS — S29.9XXA RIB INJURY: Primary | ICD-10-CM

## 2024-03-25 PROCEDURE — 6370000000 HC RX 637 (ALT 250 FOR IP): Performed by: NURSE PRACTITIONER

## 2024-03-25 PROCEDURE — 99284 EMERGENCY DEPT VISIT MOD MDM: CPT

## 2024-03-25 RX ORDER — ACETAMINOPHEN 500 MG
1000 TABLET ORAL ONCE
Status: COMPLETED | OUTPATIENT
Start: 2024-03-25 | End: 2024-03-25

## 2024-03-25 RX ADMIN — ACETAMINOPHEN 1000 MG: 500 TABLET ORAL at 22:17

## 2024-03-25 ASSESSMENT — LIFESTYLE VARIABLES
HOW OFTEN DO YOU HAVE A DRINK CONTAINING ALCOHOL: NEVER
HOW MANY STANDARD DRINKS CONTAINING ALCOHOL DO YOU HAVE ON A TYPICAL DAY: PATIENT DOES NOT DRINK

## 2024-03-25 ASSESSMENT — PAIN - FUNCTIONAL ASSESSMENT: PAIN_FUNCTIONAL_ASSESSMENT: NONE - DENIES PAIN

## 2024-03-25 ASSESSMENT — PAIN SCALES - GENERAL: PAINLEVEL_OUTOF10: 0

## 2024-03-26 ENCOUNTER — APPOINTMENT (OUTPATIENT)
Dept: CT IMAGING | Age: 89
End: 2024-03-26
Payer: MEDICARE

## 2024-03-26 PROCEDURE — 71250 CT THORAX DX C-: CPT

## 2024-03-26 RX ORDER — ACETAMINOPHEN 500 MG
1000 TABLET ORAL EVERY 6 HOURS PRN
Qty: 30 TABLET | Refills: 0 | Status: SHIPPED | OUTPATIENT
Start: 2024-03-26

## 2024-03-26 RX ORDER — IBUPROFEN 600 MG/1
600 TABLET ORAL 4 TIMES DAILY PRN
Qty: 40 TABLET | Refills: 0 | Status: SHIPPED | OUTPATIENT
Start: 2024-03-26

## 2024-03-26 ASSESSMENT — ENCOUNTER SYMPTOMS
VOMITING: 0
ABDOMINAL PAIN: 0
DIARRHEA: 0
CHEST TIGHTNESS: 0
NAUSEA: 0
SHORTNESS OF BREATH: 0

## 2024-03-26 NOTE — ED PROVIDER NOTES
I personally saw this patient in conjunction with the advanced practice provider.  Additionally, I took independent history and physical.  I agree with the history, assessment, and manage plan as documented in the chart except as otherwise noted.  Nursing notes reviewed and I agree except as otherwise noted.      Labs Reviewed - No data to display     CT CHEST WO CONTRAST   Preliminary Result   1. No visible rib fracture.  No CT finding to account for patient's chest   pain.   2. Coronary artery atherosclerosis.   3. Incidental right upper lobe pulmonary nodules measuring up to 4 mm.   Please see below for follow-up recommendations.      RECOMMENDATIONS:   Pathology: Multiple pulmonary nodules. Most significant: 4 mm right solid   pulmonary nodule within the upper lobe.Per Fleischner Society Guidelines, if   patient is low risk for malignancy, no routine follow-up imaging is   recommended. If patient is high risk for malignancy, a non-contrast Chest CT   at 12 months is optional. If performed and the nodule is stable at 12 months,   no further follow-up is recommended.These guidelines do not apply to   immunocompromised patients and patients with cancer. Follow up in patients   with significant comorbidities as clinically warranted. For lung cancer   screening, adhere to Lung-RADS guidelines. Reference: Radiology. 2017;   284(1):228-43.              Medications   acetaminophen (TYLENOL) tablet 1,000 mg (1,000 mg Oral Given 3/25/24 2217)        Emergency department course:  ED Course as of 03/26/24 0140   Tue Mar 26, 2024   0120 Patient with a 1 week history of right rib pain after she was laying on flat table at physical therapy and was supposed to roll over.  She felt a crack with immediate pain that has been persistent ever since.  Area is tender to palpation.  No rash.  CT does not show evidence for fracture. [AM]      ED Course User Index  [AM] Gianna Cameron MD   Recommend incentive spirometer as there is no

## 2024-03-26 NOTE — ED PROVIDER NOTES
[facility] EMERGENCY DEPARTMENT  EMERGENCY DEPARTMENT ENCOUNTER        Pt Name: Manpreet Puckett  MRN: [id number]  Birthdate 4/11/1934  Date of evaluation: 3/25/2024  Provider: BRITNEY Mena - ROSALINE  PCP: Valerie Win MD  Note Started: 2:36 AM EDT 3/26/24       I have seen and evaluated this patient with my supervising physician barb      CHIEF COMPLAINT       Chief Complaint   Patient presents with    Rib Pain (injury)     Pt states she was at physical therapy last week doing exercises with therapist and when she rolled over she had pain causing her to scream, believes she's cracked a rib. Complaints of pain with breathing and moving certain ways       HISTORY OF PRESENT ILLNESS: 1 or more Elements     History from : Patient and family- daughter    Limitations to history : None    Manpreet Puckett is a 89 y.o. female who presents to the emergency department with complaint of right rib injury.  The patient was laying flat on a physical therapy table about a week ago when the therapist asked the patient to roll over and she felt as well as heard a crack to the right mid ribs.  She has suffered pain since, she rates her pain as 10 of 10, worse with inspiration or movement.  No medication prior to arrival.    Denies any headache, fever, lightheadedness, dizziness, visual disturbances.  No chest pain or pressure.  No neck or back pain.  No shortness of breath, cough, or congestion.  No abdominal pain, nausea, vomiting, diarrhea, constipation, or dysuria.  No rash.    Nursing Notes were all reviewed and agreed with or any disagreements were addressed in the HPI.    REVIEW OF SYSTEMS :      Review of Systems   Constitutional:  Negative for activity change, chills and fever.   Respiratory:  Negative for chest tightness and shortness of breath.    Cardiovascular:  Negative for chest pain.   Gastrointestinal:  Negative for abdominal pain, diarrhea, nausea and vomiting.   Genitourinary:

## 2024-03-27 ENCOUNTER — APPOINTMENT (OUTPATIENT)
Dept: PHYSICAL THERAPY | Age: 89
End: 2024-03-27
Payer: MEDICARE

## 2024-04-23 ENCOUNTER — HOSPITAL ENCOUNTER (OUTPATIENT)
Dept: PHYSICAL THERAPY | Age: 89
Setting detail: THERAPIES SERIES
Discharge: HOME | End: 2024-04-23
Payer: MEDICARE

## 2024-04-23 PROCEDURE — 97110 THERAPEUTIC EXERCISES: CPT

## 2024-04-23 PROCEDURE — 97140 MANUAL THERAPY 1/> REGIONS: CPT

## 2024-04-23 NOTE — FLOWSHEET NOTE
EXAMINATION     Patient stated complaint:  Still has pain in posterior R leg with bending knee and has issues with going up/down stairs due to difficulty bending knee.  Has to go up/down slightly sideways to minimize posterior leg pain.  No issues with walking and was able to rake yard for 2 hours without pain during task but had pain afterward.  Hasn't done many exercises at home since recent report of rib issue, has done some exercises standing but none that involve laying.     Test used Initial score  2/23/24 POC  3/20/24 04/23/2024   Pain Summary VAS 6/10 at worst  0/10    Functional questionnaire LEFS 65/80  18.75 % dysfunction 55/80, 31.25% dysfunction    Other:                OBJECTIVE EXAMINATION     3/20/24  ROM/Strength: (Blank cells denote NT) Improvements noted in bold      Mvmt (norm) AROM L AROM R Notes PROM L PROM R Notes       HIP Flex (120)          Abd (45)          ER (50) 14 16  15 23     IR (45) 32 33        Ext (20)             MMT L MMT R Notes       HIP  Flexion 5 5      Abduction 5 5      ER 4 4      IR 5 5      Extension 4- 4-           KNEE  Flexion 5 5 Incr R knee pain with R MMT     Extension 5 5             ANKLE  DF 5 5      PF        Inversion        Eversion        3/6: Able to achieve 90 deg knee flexion on L with prone HS curls, only able to achieve 70 deg knee flexion on R     Exercises/Interventions     Therapeutic Ex (66990)  resistance Sets/time Reps Notes/Cues/Progressions   TM retro 0.8 mph 4 min  3/6   Long sit hamstring stretch  30\" 3 4/23   Sideling hip abduction  3 10 4/23   Sidelying clamshells  3 10 4/23   Stepper   2/28   Piriformis stretch supine     Prone HS curls  Added 3/6    standing Hip ext  - diagonal Lime band 2 10 B Added 3/6   ^3/13  ^4/23 sets/resistance    bent over table Hip ext w/ knee bent   Added 3/6   ^3/13   Lateral side stepping along table Lime band 3 laps 3/13  ^4/23 resistance   Sit to stand from raised table Lime band around thighs 3 10 4/23

## 2024-05-08 ENCOUNTER — HOSPITAL ENCOUNTER (OUTPATIENT)
Dept: PHYSICAL THERAPY | Age: 89
Setting detail: THERAPIES SERIES
Discharge: HOME | End: 2024-05-08
Payer: MEDICARE

## 2024-05-08 PROCEDURE — 97110 THERAPEUTIC EXERCISES: CPT

## 2024-05-08 NOTE — PLAN OF CARE
direct and significant impact on the need for therapy and significantly impacts the rate of recovery.       Return to Play: NA    Prognosis for POC: [x] Good [] Fair  [] Poor    Patient requires continued skilled intervention: [x] Yes  [] No      CHARGE CAPTURE     PT CHARGE GRID   CPT Code (TIMED) minutes # CPT Code (UNTIMED) #     Therex (85357)  42 3  EVAL:LOW (34023 - Typically 20 minutes face-to-face)     Neuromusc. Re-ed (14314)    Re-Eval (06468)     Manual (50710)    Estim Unattended (63135)     Ther. Act (74986)    Mech. Traction (98537)     Gait (87798)    Dry Needle 1-2 muscle (65656)     Aquatic Therex (12541)    Dry Needle 3+ muscle (20561)     Iontophoresis (55927)    VASO (21106)     Ultrasound (68404)    Group Therapy (97089)     Estim Attended (88602)    Canalith Repositioning (80131)     Other:    Other:    Total Timed Code Tx Minutes 42 3       Total Treatment Minutes 42        Charge Justification:  (88580) THERAPEUTIC EXERCISE - Provided verbal/tactile cueing for activities related to strengthening, flexibility, endurance, ROM performed to prevent loss of range of motion, maintain or improve muscular strength or increase flexibility, following either an injury or surgery.   (20539) HOME EXERCISE PROGRAM - Reviewed/Progressed HEP activities related to strengthening, flexibility, endurance, ROM performed to prevent loss of range of motion, maintain or improve muscular strength or increase flexibility, following either an injury or surgery.      GOALS     Patient stated goal: reduce pain  Status:  [x] Progressing: [] Met: [] Not Met: [] Adjusted    Therapist goals for Patient:   Short Term Goals: To be achieved in: 2 weeks  Independent in HEP and progression per patient tolerance, in order to progress toward full function and prevent re-injury.    Status: [] Progressing: [x] Met: [] Not Met: [] Adjusted  Patient will have a decrease in pain to 2/10 to help facilitate improvement in movement,

## 2024-05-15 ENCOUNTER — HOSPITAL ENCOUNTER (OUTPATIENT)
Dept: PHYSICAL THERAPY | Age: 89
Setting detail: THERAPIES SERIES
Discharge: HOME | End: 2024-05-15
Payer: MEDICARE

## 2024-05-15 PROCEDURE — 97530 THERAPEUTIC ACTIVITIES: CPT

## 2024-05-15 PROCEDURE — 97110 THERAPEUTIC EXERCISES: CPT

## 2024-05-15 NOTE — PLAN OF CARE
[facility] - Outpatient Rehabilitation and Therapy [address], [place] [address] office: [phone] fax: [phone]  Physical Therapy Discharge Summary     Dear Madhuri Kent, *  ,    We had the pleasure of treating the following patient for physical therapy services at [facility]. A summary of our findings can be found in the updated assessment below.  This includes our plan of care.  If you have any questions or concerns regarding these findings, please do not hesitate to contact me at the office phone number checked above.  Thank you for the referral.     Physician Signature:________________________________Date:__________________  By signing above (or electronic signature), therapist's plan is approved by physician      Functional Outcome: LEFS = 20% dysfunction   TRANSITION TO HEP/DC FORMAL PT: Patient is currently independent with symptom management and home exercise program. Patient reports understanding of the importance of continued compliance with home exercise program after discharge. Although patient has not yet reached any LTGs at this time, she is comfortable with discharge this date.        Overall Response to Treatment:  Patient with gross improvement overall although she did not meet any LTGs. Please see measurements from 24.     Total Visits: 8     Recommendation:    [] Continue PT   [] Hold PT, pending MD visit   [x] Discharge to HEP. Follow up with PT or MD PRN.           Physical Therapy: TREATMENT/PROGRESS NOTE   Patient: Manpreet Puckett (89 y.o. female)   Examination Date: 05/15/2024   :  1934 MRN: [id number]   Visit #: 8 /10 total    Insurance Allowable Auth Needed   [place]  5 V (requesting 5 additional visits through auth on 3/20/24)    5 V approved   3/21/24 - 24 [x]Yes    []No  24 - 24    Insurance: Payor: [employer] MEDICARE / Plan: ANTHEM MEDIBLUE ESSENTIAL/PLUS / Product Type: *No Product type* /

## 2024-07-03 ENCOUNTER — APPOINTMENT (OUTPATIENT)
Dept: GENERAL RADIOLOGY | Age: 89
End: 2024-07-03
Payer: MEDICARE

## 2024-07-03 ENCOUNTER — HOSPITAL ENCOUNTER (OUTPATIENT)
Age: 89
Setting detail: OBSERVATION
Discharge: HOME | End: 2024-07-05
Attending: EMERGENCY MEDICINE | Admitting: STUDENT IN AN ORGANIZED HEALTH CARE EDUCATION/TRAINING PROGRAM
Payer: MEDICARE

## 2024-07-03 ENCOUNTER — APPOINTMENT (OUTPATIENT)
Dept: MRI IMAGING | Age: 89
End: 2024-07-03
Payer: MEDICARE

## 2024-07-03 ENCOUNTER — APPOINTMENT (OUTPATIENT)
Dept: CT IMAGING | Age: 89
End: 2024-07-03
Attending: EMERGENCY MEDICINE
Payer: MEDICARE
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
## 2024-07-03 DIAGNOSIS — R26.9 ABNORMALITY OF GAIT: ICD-10-CM

## 2024-07-03 DIAGNOSIS — M25.552 ACUTE HIP PAIN, LEFT: Primary | ICD-10-CM

## 2024-07-03 PROBLEM — E78.00 HYPERCHOLESTEREMIA: Status: ACTIVE | Noted: 2024-07-03

## 2024-07-03 PROBLEM — M25.551 RIGHT HIP PAIN: Status: ACTIVE | Noted: 2024-07-03

## 2024-07-03 LAB
ANION GAP SERPL CALCULATED.3IONS-SCNC: 12 MMOL/L (ref 3–16)
BASOPHILS # BLD: 0 K/UL (ref 0–0.2)
BASOPHILS NFR BLD: 0.7 %
BUN SERPL-MCNC: 21 MG/DL (ref 7–20)
CALCIUM SERPL-MCNC: 9.5 MG/DL (ref 8.3–10.6)
CHLORIDE SERPL-SCNC: 100 MMOL/L (ref 99–110)
CO2 SERPL-SCNC: 25 MMOL/L (ref 21–32)
CREAT SERPL-MCNC: 1.1 MG/DL (ref 0.6–1.2)
DEPRECATED RDW RBC AUTO: 14.9 % (ref 12.4–15.4)
EOSINOPHIL # BLD: 0 K/UL (ref 0–0.6)
EOSINOPHIL NFR BLD: 0.1 %
GFR SERPLBLD CREATININE-BSD FMLA CKD-EPI: 48 ML/MIN/{1.73_M2}
GLUCOSE SERPL-MCNC: 92 MG/DL (ref 70–99)
HCT VFR BLD AUTO: 38.7 % (ref 36–48)
HGB BLD-MCNC: 13.3 G/DL (ref 12–16)
LYMPHOCYTES # BLD: 0.3 K/UL (ref 1–5.1)
LYMPHOCYTES NFR BLD: 4.9 %
MCH RBC QN AUTO: 30.1 PG (ref 26–34)
MCHC RBC AUTO-ENTMCNC: 34.3 G/DL (ref 31–36)
MCV RBC AUTO: 87.8 FL (ref 80–100)
MONOCYTES # BLD: 0.7 K/UL (ref 0–1.3)
MONOCYTES NFR BLD: 11 %
NEUTROPHILS # BLD: 5.4 K/UL (ref 1.7–7.7)
NEUTROPHILS NFR BLD: 83.3 %
PLATELET # BLD AUTO: 166 K/UL (ref 135–450)
PMV BLD AUTO: 8.6 FL (ref 5–10.5)
POTASSIUM SERPL-SCNC: 4.1 MMOL/L (ref 3.5–5.1)
RBC # BLD AUTO: 4.4 M/UL (ref 4–5.2)
SODIUM SERPL-SCNC: 137 MMOL/L (ref 136–145)
WBC # BLD AUTO: 6.4 K/UL (ref 4–11)

## 2024-07-03 PROCEDURE — 96374 THER/PROPH/DIAG INJ IV PUSH: CPT

## 2024-07-03 PROCEDURE — G0378 HOSPITAL OBSERVATION PER HR: HCPCS

## 2024-07-03 PROCEDURE — 73700 CT LOWER EXTREMITY W/O DYE: CPT

## 2024-07-03 PROCEDURE — 73721 MRI JNT OF LWR EXTRE W/O DYE: CPT

## 2024-07-03 PROCEDURE — 6360000002 HC RX W HCPCS: Performed by: EMERGENCY MEDICINE

## 2024-07-03 PROCEDURE — 96372 THER/PROPH/DIAG INJ SC/IM: CPT

## 2024-07-03 PROCEDURE — 6370000000 HC RX 637 (ALT 250 FOR IP): Performed by: EMERGENCY MEDICINE

## 2024-07-03 PROCEDURE — 80048 BASIC METABOLIC PNL TOTAL CA: CPT

## 2024-07-03 PROCEDURE — 84443 ASSAY THYROID STIM HORMONE: CPT

## 2024-07-03 PROCEDURE — 6360000002 HC RX W HCPCS: Performed by: STUDENT IN AN ORGANIZED HEALTH CARE EDUCATION/TRAINING PROGRAM

## 2024-07-03 PROCEDURE — 73502 X-RAY EXAM HIP UNI 2-3 VIEWS: CPT

## 2024-07-03 PROCEDURE — 84439 ASSAY OF FREE THYROXINE: CPT

## 2024-07-03 PROCEDURE — 2580000003 HC RX 258: Performed by: STUDENT IN AN ORGANIZED HEALTH CARE EDUCATION/TRAINING PROGRAM

## 2024-07-03 PROCEDURE — 85025 COMPLETE CBC W/AUTO DIFF WBC: CPT

## 2024-07-03 PROCEDURE — 99285 EMERGENCY DEPT VISIT HI MDM: CPT

## 2024-07-03 PROCEDURE — G0378 HOSPITAL OBSERVATION PER HR: HCPCS | Performed by: NURSE PRACTITIONER

## 2024-07-03 RX ORDER — POTASSIUM CHLORIDE 20 MEQ/1
40 TABLET, EXTENDED RELEASE ORAL PRN
Status: DISCONTINUED | OUTPATIENT
Start: 2024-07-03 | End: 2024-07-05 | Stop reason: HOSPADM

## 2024-07-03 RX ORDER — ACETAMINOPHEN 500 MG
1000 TABLET ORAL
Status: COMPLETED | OUTPATIENT
Start: 2024-07-03 | End: 2024-07-03

## 2024-07-03 RX ORDER — SODIUM CHLORIDE 9 MG/ML
INJECTION, SOLUTION INTRAVENOUS PRN
Status: DISCONTINUED | OUTPATIENT
Start: 2024-07-03 | End: 2024-07-05 | Stop reason: HOSPADM

## 2024-07-03 RX ORDER — POTASSIUM CHLORIDE 7.45 MG/ML
10 INJECTION INTRAVENOUS PRN
Status: DISCONTINUED | OUTPATIENT
Start: 2024-07-03 | End: 2024-07-05 | Stop reason: HOSPADM

## 2024-07-03 RX ORDER — SODIUM CHLORIDE 0.9 % (FLUSH) 0.9 %
5-40 SYRINGE (ML) INJECTION EVERY 12 HOURS SCHEDULED
Status: DISCONTINUED | OUTPATIENT
Start: 2024-07-03 | End: 2024-07-05 | Stop reason: HOSPADM

## 2024-07-03 RX ORDER — ACETAMINOPHEN 650 MG/1
650 SUPPOSITORY RECTAL EVERY 6 HOURS PRN
Status: DISCONTINUED | OUTPATIENT
Start: 2024-07-03 | End: 2024-07-03

## 2024-07-03 RX ORDER — MAGNESIUM SULFATE IN WATER 40 MG/ML
2000 INJECTION, SOLUTION INTRAVENOUS PRN
Status: DISCONTINUED | OUTPATIENT
Start: 2024-07-03 | End: 2024-07-05 | Stop reason: HOSPADM

## 2024-07-03 RX ORDER — ONDANSETRON 4 MG/1
4 TABLET, ORALLY DISINTEGRATING ORAL EVERY 8 HOURS PRN
Status: DISCONTINUED | OUTPATIENT
Start: 2024-07-03 | End: 2024-07-05 | Stop reason: HOSPADM

## 2024-07-03 RX ORDER — ACETAMINOPHEN 500 MG
1000 TABLET ORAL EVERY 6 HOURS PRN
Status: DISCONTINUED | OUTPATIENT
Start: 2024-07-03 | End: 2024-07-05 | Stop reason: HOSPADM

## 2024-07-03 RX ORDER — ENOXAPARIN SODIUM 100 MG/ML
40 INJECTION SUBCUTANEOUS DAILY
Status: DISCONTINUED | OUTPATIENT
Start: 2024-07-03 | End: 2024-07-05 | Stop reason: HOSPADM

## 2024-07-03 RX ORDER — ONDANSETRON 2 MG/ML
4 INJECTION INTRAMUSCULAR; INTRAVENOUS EVERY 6 HOURS PRN
Status: DISCONTINUED | OUTPATIENT
Start: 2024-07-03 | End: 2024-07-05 | Stop reason: HOSPADM

## 2024-07-03 RX ORDER — POLYETHYLENE GLYCOL 3350 17 G/17G
17 POWDER, FOR SOLUTION ORAL DAILY PRN
Status: DISCONTINUED | OUTPATIENT
Start: 2024-07-03 | End: 2024-07-05 | Stop reason: HOSPADM

## 2024-07-03 RX ORDER — ACETAMINOPHEN 325 MG/1
650 TABLET ORAL EVERY 6 HOURS PRN
Status: DISCONTINUED | OUTPATIENT
Start: 2024-07-03 | End: 2024-07-03

## 2024-07-03 RX ORDER — HYDROMORPHONE HYDROCHLORIDE 1 MG/ML
0.5 INJECTION, SOLUTION INTRAMUSCULAR; INTRAVENOUS; SUBCUTANEOUS
Status: DISCONTINUED | OUTPATIENT
Start: 2024-07-03 | End: 2024-07-05 | Stop reason: HOSPADM

## 2024-07-03 RX ORDER — FENTANYL CITRATE 50 UG/ML
25 INJECTION, SOLUTION INTRAMUSCULAR; INTRAVENOUS ONCE
Status: COMPLETED | OUTPATIENT
Start: 2024-07-03 | End: 2024-07-03

## 2024-07-03 RX ORDER — SODIUM CHLORIDE 0.9 % (FLUSH) 0.9 %
5-40 SYRINGE (ML) INJECTION PRN
Status: DISCONTINUED | OUTPATIENT
Start: 2024-07-03 | End: 2024-07-05 | Stop reason: HOSPADM

## 2024-07-03 RX ADMIN — SODIUM CHLORIDE, PRESERVATIVE FREE 10 ML: 5 INJECTION INTRAVENOUS at 20:44

## 2024-07-03 RX ADMIN — ENOXAPARIN SODIUM 40 MG: 100 INJECTION SUBCUTANEOUS at 19:02

## 2024-07-03 RX ADMIN — HYDROMORPHONE HYDROCHLORIDE 0.5 MG: 1 INJECTION, SOLUTION INTRAMUSCULAR; INTRAVENOUS; SUBCUTANEOUS at 20:44

## 2024-07-03 RX ADMIN — ACETAMINOPHEN 1000 MG: 500 TABLET ORAL at 12:44

## 2024-07-03 RX ADMIN — FENTANYL CITRATE 25 MCG: 50 INJECTION INTRAMUSCULAR; INTRAVENOUS at 12:47

## 2024-07-03 ASSESSMENT — PAIN DESCRIPTION - LOCATION: LOCATION: HIP

## 2024-07-03 ASSESSMENT — PAIN SCALES - WONG BAKER: WONGBAKER_NUMERICALRESPONSE: NO HURT

## 2024-07-03 ASSESSMENT — PAIN SCALES - GENERAL
PAINLEVEL_OUTOF10: 10
PAINLEVEL_OUTOF10: 0
PAINLEVEL_OUTOF10: 0

## 2024-07-03 ASSESSMENT — PAIN DESCRIPTION - ORIENTATION: ORIENTATION: RIGHT

## 2024-07-03 NOTE — ED NOTES
How does patient ambulate?   []Low Fall Risk (ambulates by themselves without support)  []Stand by assist   []Contact Guard   []Front wheel walker  []Wheelchair   []Steady  []Bed bound  []History of Lower Extremity Amputation  [x]Unknown, did not assess in the emergency department   How does patient take pills?  [x]Whole with Water  []Crushed in applesauce  []Crushed in pudding  []Other  []Unknown no oral medications were given in the ED  Is patient alert?   [x]Alert  []Drowsy but responds to voice  []Doesn't respond to voice but responds to painful stimuli  []Unresponsive  Is patient oriented?   [x]To person  [x]To place  [x]To time  [x]To situation  []Confused  []Agitated  [x]Follows commands  If patient is disoriented or from a Skill Nursing Facility has family been notified of admission?   []Yes   []No  NA  Patient belongings?   []Cell phone  []Wallet   []Dentures  []Clothing  Any specific patient or family belongings/needs/dynamics?   Son at bedside  Miscellaneous comments/pending orders?  All ED orders completed.      If there are any additional questions please reach out to the Emergency Department.

## 2024-07-03 NOTE — H&P
Hospital Medicine History & Physical        Name:  Manpreet Puckett  /Age/Sex: 1934  (89 y.o. female)  MRN & CSN:  [id number] & [id number]     PCP: Valerie Win MD    Date of Admission: 7/3/2024    Date of Service: Patient seen/examined on 7/3/2024    Patient Status:  Observation - Patient can most likely be adequately treated within 2 midnights from hospital admission     Chief Complaint:    Chief Complaint   Patient presents with    Fall     Pt via EMS from home, fell down hill yesterday, was on toilet early this morning and couldn't stand up off the toilet due to right hip pain         History Of Present Illness:     89 y.o. female with PMHx of HTN, seizures, HLD who presented to [facility] with complaints of fall and right hip pain.    Patient states she was gardening at home when she fell onto her right hip on .  She states she had some pain at the time, but was able to get back into her house.  Patient then went to sleep and awoke to use the restroom around 5 AM.  When she sat down on the toilet, patient could not get up.  Family had to come to her house and help her.  Patient denies any numbness or tingling in her right leg.  She does have some chronic sciatic pain at baseline, but nothing new.  Patient is able to move her right leg/hip, but this causes significant pain.    Denies chest pain, shortness of breath, nausea, vomiting, GI/ symptoms, edema, fever, or chills.    Denies tobacco, alcohol, or illicit drug use.      Past Medical History:          Diagnosis Date    Arthritis     Edema 2019    lower legs and feet    Hypercholesteremia     Hypertension     Seizure (HCC)     had few episodes of probable nocturnal seizures (2015, 2018, 3/2019), negative eeg and MRI       Past Surgical History:          Procedure Laterality Date    APPENDECTOMY      CATARACT EXTRACTION Right     COLONOSCOPY      SALPINGO-OOPHORECTOMY  2017    robotic, and hysteroscopy d&c,

## 2024-07-03 NOTE — ED PROVIDER NOTES
[facility] EMERGENCY DEPARTMENT    CHIEF COMPLAINT  Fall (Pt via EMS from home, fell down hill yesterday, was on toilet early this morning and couldn't stand up off the toilet due to right hip pain)       HISTORY OF PRESENT ILLNESS  Manpreet Puckett is a 89 y.o. female who presents to the ED following a fall and right hip pain.  She fell yesterday while working in her yard.  She was on the ground for about 30 minutes when she pushed herself up with the watering can and used this as a crutch to get inside.  When she tried to get up to go to the bathroom overnight it was excruciatingly painful in her right hip.  She was in so much pain that she was not able to stand up from the commode and sat on the commode for several hours until family saw her and called EMS.  Denies head trauma, anticoagulation, loss of consciousness.  Denies numbness or paresthesias.  Pain is somewhat controlled when she is not bearing weight but is excruciating when she tries to bear weight on her right hip.  Denies back, knee, or other injury or pain.     I have reviewed the following from the nursing documentation:    Past Medical History:   Diagnosis Date    Arthritis     Edema 04/22/2019    lower legs and feet    Hypercholesteremia     Hypertension     Seizure (HCC)     had few episodes of probable nocturnal seizures (7/2015, 8/2018, 3/2019), negative eeg and MRI     Past Surgical History:   Procedure Laterality Date    APPENDECTOMY  1949    CATARACT EXTRACTION Right 2009    COLONOSCOPY      SALPINGO-OOPHORECTOMY  12/11/2017    robotic, and hysteroscopy d&c, polypectomy     TOTAL HIP ARTHROPLASTY Right 05/01/2019    RIGHT HIP TOTAL ARTHROPLASTY performed by Dallas Torres MD at [facility] OR    TOTAL HIP ARTHROPLASTY Left 2008     Family History   Problem Relation Age of Onset    No Known Problems Sister     Parkinsonism Brother     No Known Problems Brother     No Known Problems Son     No Known Problems Son     No Known  K/uL    RBC 4.40 4.00 - 5.20 M/uL    Hemoglobin 13.3 12.0 - 16.0 g/dL    Hematocrit 38.7 36.0 - 48.0 %    MCV 87.8 80.0 - 100.0 fL    MCH 30.1 26.0 - 34.0 pg    MCHC 34.3 31.0 - 36.0 g/dL    RDW 14.9 12.4 - 15.4 %    Platelets 166 135 - 450 K/uL    MPV 8.6 5.0 - 10.5 fL    Neutrophils % 83.3 %    Lymphocytes % 4.9 %    Monocytes % 11.0 %    Eosinophils % 0.1 %    Basophils % 0.7 %    Neutrophils Absolute 5.4 1.7 - 7.7 K/uL    Lymphocytes Absolute 0.3 (L) 1.0 - 5.1 K/uL    Monocytes Absolute 0.7 0.0 - 1.3 K/uL    Eosinophils Absolute 0.0 0.0 - 0.6 K/uL    Basophils Absolute 0.0 0.0 - 0.2 K/uL         RADIOLOGY  I have reviewed all radiographic studies for this visit.   CT HIP RIGHT WO CONTRAST   Final Result   No acute pelvic fracture seen.         XR HIP RIGHT (2-3 VIEWS)   Final Result   No acute findings or hardware complication                  ED COURSE/MDM    89 y.o. female presents with right hip pain following a ground-level nonsyncopal fall yesterday. Hx remote arthroplasty.  Upon presentation, she is nontoxic-appearing and in no acute distress.  She is neurovascularly intact.  However, she is unable to bear weight due to right hip pain.  She is not able to carry out her activities of daily living.  X-ray unremarkable, CT obtained to assess for occult fracture and also unremarkable.  She will require admission for likely MRI, PT/OT evaluation given her inability to ambulate.    Care discussed with hospitalist via [employer].         - Consults:   IP CONSULT TO HOSPITALIST         I, Meme Butterfield MD, am the primary clinician of record.     During the patient's ED course, the patient was given:  Medications   acetaminophen (TYLENOL) tablet 1,000 mg (1,000 mg Oral Given 7/3/24 1244)   fentaNYL (SUBLIMAZE) injection 25 mcg (25 mcg IntraMUSCular Given 7/3/24 1247)       All questions were answered and the patient/family expressed understanding and agreement with the plan.     PROCEDURES  None    CRITICAL

## 2024-07-04 LAB
ALBUMIN SERPL-MCNC: 3.4 G/DL (ref 3.4–5)
ANION GAP SERPL CALCULATED.3IONS-SCNC: 12 MMOL/L (ref 3–16)
BASOPHILS # BLD: 0 K/UL (ref 0–0.2)
BASOPHILS NFR BLD: 1 %
BUN SERPL-MCNC: 23 MG/DL (ref 7–20)
CALCIUM SERPL-MCNC: 8.8 MG/DL (ref 8.3–10.6)
CHLORIDE SERPL-SCNC: 104 MMOL/L (ref 99–110)
CO2 SERPL-SCNC: 24 MMOL/L (ref 21–32)
CREAT SERPL-MCNC: 0.9 MG/DL (ref 0.6–1.2)
DEPRECATED RDW RBC AUTO: 14.9 % (ref 12.4–15.4)
EOSINOPHIL # BLD: 0.1 K/UL (ref 0–0.6)
EOSINOPHIL NFR BLD: 2.4 %
GFR SERPLBLD CREATININE-BSD FMLA CKD-EPI: 61 ML/MIN/{1.73_M2}
GLUCOSE SERPL-MCNC: 99 MG/DL (ref 70–99)
HCT VFR BLD AUTO: 36.2 % (ref 36–48)
HGB BLD-MCNC: 12 G/DL (ref 12–16)
LEVETIRACETAM SERPL-MCNC: <2 UG/ML (ref 6–46)
LYMPHOCYTES # BLD: 0.3 K/UL (ref 1–5.1)
LYMPHOCYTES NFR BLD: 6.9 %
MAGNESIUM SERPL-MCNC: 2.2 MG/DL (ref 1.8–2.4)
MCH RBC QN AUTO: 29.2 PG (ref 26–34)
MCHC RBC AUTO-ENTMCNC: 33 G/DL (ref 31–36)
MCV RBC AUTO: 88.5 FL (ref 80–100)
MEDICATION DOSE-MCNC: ABNORMAL
MONOCYTES # BLD: 0.7 K/UL (ref 0–1.3)
MONOCYTES NFR BLD: 13.6 %
NEUTROPHILS # BLD: 3.9 K/UL (ref 1.7–7.7)
NEUTROPHILS NFR BLD: 76.1 %
PHOSPHATE SERPL-MCNC: 2.8 MG/DL (ref 2.5–4.9)
PLATELET # BLD AUTO: 157 K/UL (ref 135–450)
PMV BLD AUTO: 8.3 FL (ref 5–10.5)
POTASSIUM SERPL-SCNC: 4.2 MMOL/L (ref 3.5–5.1)
RBC # BLD AUTO: 4.09 M/UL (ref 4–5.2)
SODIUM SERPL-SCNC: 140 MMOL/L (ref 136–145)
T4 FREE SERPL-MCNC: 1.2 NG/DL (ref 0.9–1.8)
TSH SERPL DL<=0.005 MIU/L-ACNC: 4.28 UIU/ML (ref 0.27–4.2)
WBC # BLD AUTO: 5.1 K/UL (ref 4–11)

## 2024-07-04 PROCEDURE — 85025 COMPLETE CBC W/AUTO DIFF WBC: CPT

## 2024-07-04 PROCEDURE — 80069 RENAL FUNCTION PANEL: CPT

## 2024-07-04 PROCEDURE — 6360000002 HC RX W HCPCS: Performed by: STUDENT IN AN ORGANIZED HEALTH CARE EDUCATION/TRAINING PROGRAM

## 2024-07-04 PROCEDURE — 36415 COLL VENOUS BLD VENIPUNCTURE: CPT

## 2024-07-04 PROCEDURE — 80177 DRUG SCRN QUAN LEVETIRACETAM: CPT

## 2024-07-04 PROCEDURE — G0378 HOSPITAL OBSERVATION PER HR: HCPCS

## 2024-07-04 PROCEDURE — 2580000003 HC RX 258: Performed by: STUDENT IN AN ORGANIZED HEALTH CARE EDUCATION/TRAINING PROGRAM

## 2024-07-04 PROCEDURE — 83735 ASSAY OF MAGNESIUM: CPT

## 2024-07-04 PROCEDURE — 97535 SELF CARE MNGMENT TRAINING: CPT

## 2024-07-04 PROCEDURE — 6370000000 HC RX 637 (ALT 250 FOR IP): Performed by: NURSE PRACTITIONER

## 2024-07-04 PROCEDURE — 97165 OT EVAL LOW COMPLEX 30 MIN: CPT

## 2024-07-04 PROCEDURE — 96372 THER/PROPH/DIAG INJ SC/IM: CPT

## 2024-07-04 PROCEDURE — 99221 1ST HOSP IP/OBS SF/LOW 40: CPT | Performed by: PSYCHIATRY & NEUROLOGY

## 2024-07-04 RX ORDER — VITAMIN B COMPLEX
1000 TABLET ORAL DAILY
Status: DISCONTINUED | OUTPATIENT
Start: 2024-07-05 | End: 2024-07-05 | Stop reason: HOSPADM

## 2024-07-04 RX ORDER — ASPIRIN 81 MG/1
81 TABLET, CHEWABLE ORAL DAILY
Status: DISCONTINUED | OUTPATIENT
Start: 2024-07-05 | End: 2024-07-05 | Stop reason: HOSPADM

## 2024-07-04 RX ORDER — LISINOPRIL 20 MG/1
20 TABLET ORAL DAILY
Status: DISCONTINUED | OUTPATIENT
Start: 2024-07-04 | End: 2024-07-04

## 2024-07-04 RX ORDER — LISINOPRIL 20 MG/1
20 TABLET ORAL NIGHTLY
Status: DISCONTINUED | OUTPATIENT
Start: 2024-07-05 | End: 2024-07-05 | Stop reason: HOSPADM

## 2024-07-04 RX ADMIN — LISINOPRIL 20 MG: 20 TABLET ORAL at 08:16

## 2024-07-04 RX ADMIN — SODIUM CHLORIDE, PRESERVATIVE FREE 10 ML: 5 INJECTION INTRAVENOUS at 07:53

## 2024-07-04 RX ADMIN — ENOXAPARIN SODIUM 40 MG: 100 INJECTION SUBCUTANEOUS at 07:53

## 2024-07-04 RX ADMIN — SODIUM CHLORIDE, PRESERVATIVE FREE 10 ML: 5 INJECTION INTRAVENOUS at 21:00

## 2024-07-04 ASSESSMENT — PAIN SCALES - GENERAL: PAINLEVEL_OUTOF10: 5

## 2024-07-04 ASSESSMENT — PAIN DESCRIPTION - LOCATION: LOCATION: HIP

## 2024-07-04 ASSESSMENT — PAIN DESCRIPTION - ORIENTATION: ORIENTATION: RIGHT

## 2024-07-04 ASSESSMENT — PAIN SCALES - WONG BAKER: WONGBAKER_NUMERICALRESPONSE: HURTS LITTLE MORE

## 2024-07-04 NOTE — PROGRESS NOTES
MD notified of pt.'s request for aspirin order (takes at home daily) MD also notified of pt.'s concerns of not taking lisinopril at night (when she takes it at home) as she received her dose this morning.

## 2024-07-04 NOTE — CONSULTS
bilaterally  III,IV,VI: Extra Ocular Movements are intact. No nystagmus  V: Facial sensation is intact  VII: Facial strength and movements: intact and symmetric  IX: Normal palatal elevation and shoulder shrug  XII: Tongue movements are normal  Musculoskeletal: 5/5 in all 4 extremities.  Good range of motion.  No muscle atrophy.    Tone: Normal tone.   Reflexes: Symmetric 2+ in the arms and 2+ in the legs   Planters: Flexor bilaterally  Coordination: no pronator drift, no dysmetria with FNF and normal REM  Sensation: normal in both arms and legs.  Gait/Posture: steady gait         Medical decision making:      A. Problems (any 1)    High:    [] Acute/Chronic Illness/injury posing threat to life or bodily function:    [] Severe exacerbation of chronic illness:      Moderate:    []     1 or more chronic illness with exacerbation, progression or side effect of treatment or  [x]     2 or more stable chronic illnesses or  []     1 acute illness with systemic symptoms     ---------------------------------------------------------------------  B. Risk of Treatment (any 1)   [x] Drugs/treatments that require intensive monitoring for toxicity include:    [] Change in code status:    [] Decision to escalate care:    [] Major surgery/procedure with associated risk factors:    [x] Prescription drug management  ----------------------------------------------------------------------  [] High (any 2)  [x] Moderate (any 1)    C. Data (any 2 for high and any 1 for moderate)  [] Discussed management of the case with:    [] Imaging personally reviewed and interpreted  [x] Data Review (any 3)  [x] Collateral history obtained from:    [x] All available Consultant notes from yesterday/today were reviewed  [x] All current labs were reviewed and interpreted for clinical significance   [x] Appropriate follow-up labs were ordered    Data: reviewed   LABS:   Lab Results   Component Value Date/Time     07/04/2024 06:14 AM    K 4.2  07/04/2024 06:14 AM    K 4.1 07/03/2024 02:47 PM     07/04/2024 06:14 AM    CO2 24 07/04/2024 06:14 AM    BUN 23 07/04/2024 06:14 AM    CREATININE 0.9 07/04/2024 06:14 AM    GFRAA 64 10/05/2021 01:37 PM    LABGLOM 61 07/04/2024 06:14 AM    LABGLOM >60 12/13/2023 09:57 AM    GLUCOSE 99 07/04/2024 06:14 AM    PHOS 2.8 07/04/2024 06:14 AM    MG 2.20 07/04/2024 06:14 AM    CALCIUM 8.8 07/04/2024 06:14 AM     Lab Results   Component Value Date/Time    WBC 5.1 07/04/2024 06:14 AM    RBC 4.09 07/04/2024 06:14 AM    HGB 12.0 07/04/2024 06:14 AM    HCT 36.2 07/04/2024 06:14 AM    MCV 88.5 07/04/2024 06:14 AM    RDW 14.9 07/04/2024 06:14 AM     07/04/2024 06:14 AM     Lab Results   Component Value Date    INR 1.13 05/15/2019    PROTIME 12.9 05/15/2019         Impression:  Recent mechanical fall  History of seizure disorder, not for several months  Hypertension      Recommendation:  No need to start Keppra at this point  Seizure precaution discussed with the patient including driving precaution  Follow-up with [facility] neurologist outpatient  Continue current workup for hip pain  Continue lisinopril at current dose  DVT and GI prophylaxis  Nothing to add from neurology at this point  Will sign off      Thank you for referring such patient. If you have any questions regarding my consult note, please don't hesitate to call me.     Raj Benson MD  [phone]    This dictation was generated by voice recognition computer software. Although all attempts are made to edit the dictation for accuracy, there may be errors in the  transcription that are not intended

## 2024-07-04 NOTE — PROGRESS NOTES
Shift assessment completed. Routine vitals obtained. Scheduled medications given.  Call light within reach.

## 2024-07-04 NOTE — PROGRESS NOTES
[facility]    7/4/2024 7:34 AM        Name: Manpreet Puckett .              Admitted: 7/3/2024  Primary Care Provider: Valerie Win MD (Tel: [phone])      Subjective:  .    Pt admitted with right hip pain after having a mechanical fall in her yard ( was watering her flowers and slipped on the landscaping gravel)     Pt seen this am while up in the chair.  She feels better and has just worked with therapy.  She does not report any current pain and declines any tylenol etc.    She is very independent and walks daily etc.   She stopped her keppra in March bc she read that it caused memory loss... she follows with Dr Hagan.  She reports that she has not had any seizures.          Reviewed interval ancillary notes    Current Medications  sodium chloride flush 0.9 % injection 5-40 mL, 2 times per day  sodium chloride flush 0.9 % injection 5-40 mL, PRN  0.9 % sodium chloride infusion, PRN  potassium chloride (KLOR-CON M) extended release tablet 40 mEq, PRN   Or  potassium bicarb-citric acid (EFFER-K) effervescent tablet 40 mEq, PRN   Or  potassium chloride 10 mEq/100 mL IVPB (Peripheral Line), PRN  magnesium sulfate 2000 mg in 50 mL IVPB premix, PRN  enoxaparin (LOVENOX) injection 40 mg, Daily  ondansetron (ZOFRAN-ODT) disintegrating tablet 4 mg, Q8H PRN   Or  ondansetron (ZOFRAN) injection 4 mg, Q6H PRN  polyethylene glycol (GLYCOLAX) packet 17 g, Daily PRN  HYDROmorphone HCl PF (DILAUDID) injection 0.5 mg, Q3H PRN  acetaminophen (TYLENOL) tablet 1,000 mg, Q6H PRN        Objective:  BP (!) 157/76   Pulse 77   Temp 97.9 °F (36.6 °C) (Oral)   Resp 16   Ht 1.651 m (5' 5\")   Wt 60.3 kg (133 lb)   SpO2 95%   BMI 22.13 kg/m²     Intake/Output Summary (Last 24 hours) at 7/4/2024 0734  Last data filed at 7/3/2024 1800  Gross per 24 hour   Intake 240 ml   Output --   Net 240 ml      Wt Readings from Last 3 Encounters:   07/03/24 60.3 kg (133 lb)   01/29/24 59 kg (130 lb)

## 2024-07-04 NOTE — SIGNIFICANT EVENT
Pt reported to RN requesting acetaminophen order change from 650 mg  prn to 1000 mg dosing (consistent with her home med regimen).  Order changed accordingly - 1000 mg po q6 hrs prn.   LFTs look normal when last checked 12/13/23    Curt Ramey MD

## 2024-07-04 NOTE — PROGRESS NOTES
[facility] - Inpatient Rehabilitation Department   Phone: [phone]    Occupational Therapy    [x] Initial Evaluation            [] Daily Treatment Note         [] Discharge Summary      Patient: Manpreet Puckett   : 1934   MRN: [id number]   Date of Service:  2024    Admitting Diagnosis:  Fall  Current Admission Summary: Manpreet Puckett is a 89 y.o. female who presents to the ED following a fall and right hip pain.  She fell yesterday while working in her yard.  She was on the ground for about 30 minutes when she pushed herself up with the watering can and used this as a crutch to get inside.  When she tried to get up to go to the bathroom overnight it was excruciatingly painful in her right hip.  She was in so much pain that she was not able to stand up from the commode and sat on the commode for several hours until family saw her and called EMS.  Denies head trauma, anticoagulation, loss of consciousness.  Denies numbness or paresthesias.  Pain is somewhat controlled when she is not bearing weight but is excruciating when she tries to bear weight on her right hip.  Denies back, knee, or other injury or pain.      CT and MRI 7/3 - show no acute fx or abnormalities    Past Medical History:  has a past medical history of Arthritis, Edema, Hypercholesteremia, Hypertension, and Seizure (HCC).  Past Surgical History:  has a past surgical history that includes Appendectomy (1949); Salpingo-oophorectomy (2017); Colonoscopy; Total hip arthroplasty (Right, 2019); Total hip arthroplasty (Left, ); and Cataract extraction (Right, ).    Discharge Recommendations: Manpreet Puckett scored a 20/24 on the AM-PAC ADL Inpatient form. Current research shows that an AM-PAC score of 18 or greater is typically associated with a discharge to the patient's home setting. Based on the patient's AM-PAC score, and their current ADL deficits, it is recommended that the patient have 2-3 sessions  training, home management training, pain management, home exercise program, safety education, and equipment evaluation/education    Goals  Patient Goals: \"to get back home and IND again\"   Short Term Goals:  Time Frame: prior to d/c  Patient will complete lower body ADL at contact guard assistance - w/ LRAD  Patient will complete toileting at supervision   Patient will complete functional transfers at supervision   Patient to gather and transport IADL items at stand by assistance     Above goals reviewed on 7/4/2024.  All goals are ongoing at this time unless indicated above.       Therapy Session Time     Individual Group Co-treatment   Time In 0831      Time Out 0924      Minutes 53           Timed Code Treatment Minutes: 38 minutes  Total Treatment Minutes: 53 minutes       Electronically Signed By: GARETT Benavides MOT OTR/L, [id number] 7/4/2024 11:09 AM

## 2024-07-05 VITALS
TEMPERATURE: 98.5 F | SYSTOLIC BLOOD PRESSURE: 102 MMHG | WEIGHT: 133 LBS | OXYGEN SATURATION: 94 % | RESPIRATION RATE: 16 BRPM | BODY MASS INDEX: 22.16 KG/M2 | HEART RATE: 78 BPM | DIASTOLIC BLOOD PRESSURE: 63 MMHG | HEIGHT: 65 IN

## 2024-07-05 LAB
ALBUMIN SERPL-MCNC: 3.4 G/DL (ref 3.4–5)
ANION GAP SERPL CALCULATED.3IONS-SCNC: 11 MMOL/L (ref 3–16)
BASOPHILS # BLD: 0 K/UL (ref 0–0.2)
BASOPHILS NFR BLD: 1 %
BUN SERPL-MCNC: 17 MG/DL (ref 7–20)
CALCIUM SERPL-MCNC: 9.3 MG/DL (ref 8.3–10.6)
CHLORIDE SERPL-SCNC: 104 MMOL/L (ref 99–110)
CO2 SERPL-SCNC: 26 MMOL/L (ref 21–32)
CREAT SERPL-MCNC: 0.7 MG/DL (ref 0.6–1.2)
DEPRECATED RDW RBC AUTO: 14.4 % (ref 12.4–15.4)
EOSINOPHIL # BLD: 0 K/UL (ref 0–0.6)
EOSINOPHIL NFR BLD: 1 %
GFR SERPLBLD CREATININE-BSD FMLA CKD-EPI: 82 ML/MIN/{1.73_M2}
GLUCOSE SERPL-MCNC: 104 MG/DL (ref 70–99)
HCT VFR BLD AUTO: 37.5 % (ref 36–48)
HGB BLD-MCNC: 12.8 G/DL (ref 12–16)
LYMPHOCYTES # BLD: 0.4 K/UL (ref 1–5.1)
LYMPHOCYTES NFR BLD: 7 %
MAGNESIUM SERPL-MCNC: 2.2 MG/DL (ref 1.8–2.4)
MCH RBC QN AUTO: 29.8 PG (ref 26–34)
MCHC RBC AUTO-ENTMCNC: 34.1 G/DL (ref 31–36)
MCV RBC AUTO: 87.4 FL (ref 80–100)
METAMYELOCYTES NFR BLD MANUAL: 1 %
MONOCYTES # BLD: 0.6 K/UL (ref 0–1.3)
MONOCYTES NFR BLD: 17 %
NEUTROPHILS # BLD: 2.6 K/UL (ref 1.7–7.7)
NEUTROPHILS NFR BLD: 70 %
PHOSPHATE SERPL-MCNC: 3.5 MG/DL (ref 2.5–4.9)
PLATELET # BLD AUTO: 172 K/UL (ref 135–450)
PLATELET BLD QL SMEAR: ADEQUATE
PMV BLD AUTO: 8.5 FL (ref 5–10.5)
POTASSIUM SERPL-SCNC: 4.2 MMOL/L (ref 3.5–5.1)
RBC # BLD AUTO: 4.29 M/UL (ref 4–5.2)
RBC MORPH BLD: NORMAL
SLIDE REVIEW: ABNORMAL
SODIUM SERPL-SCNC: 141 MMOL/L (ref 136–145)
VARIANT LYMPHS NFR BLD MANUAL: 3 % (ref 0–6)
WBC # BLD AUTO: 3.7 K/UL (ref 4–11)

## 2024-07-05 PROCEDURE — 97530 THERAPEUTIC ACTIVITIES: CPT

## 2024-07-05 PROCEDURE — 6360000002 HC RX W HCPCS: Performed by: STUDENT IN AN ORGANIZED HEALTH CARE EDUCATION/TRAINING PROGRAM

## 2024-07-05 PROCEDURE — 6370000000 HC RX 637 (ALT 250 FOR IP): Performed by: NURSE PRACTITIONER

## 2024-07-05 PROCEDURE — 97116 GAIT TRAINING THERAPY: CPT

## 2024-07-05 PROCEDURE — 83735 ASSAY OF MAGNESIUM: CPT

## 2024-07-05 PROCEDURE — G0378 HOSPITAL OBSERVATION PER HR: HCPCS

## 2024-07-05 PROCEDURE — 80069 RENAL FUNCTION PANEL: CPT

## 2024-07-05 PROCEDURE — 97535 SELF CARE MNGMENT TRAINING: CPT

## 2024-07-05 PROCEDURE — 85025 COMPLETE CBC W/AUTO DIFF WBC: CPT

## 2024-07-05 PROCEDURE — 36415 COLL VENOUS BLD VENIPUNCTURE: CPT

## 2024-07-05 PROCEDURE — 96372 THER/PROPH/DIAG INJ SC/IM: CPT

## 2024-07-05 PROCEDURE — 97161 PT EVAL LOW COMPLEX 20 MIN: CPT

## 2024-07-05 PROCEDURE — 2580000003 HC RX 258: Performed by: STUDENT IN AN ORGANIZED HEALTH CARE EDUCATION/TRAINING PROGRAM

## 2024-07-05 RX ADMIN — SODIUM CHLORIDE, PRESERVATIVE FREE 10 ML: 5 INJECTION INTRAVENOUS at 08:16

## 2024-07-05 RX ADMIN — ENOXAPARIN SODIUM 40 MG: 100 INJECTION SUBCUTANEOUS at 08:15

## 2024-07-05 RX ADMIN — ASPIRIN 81 MG 81 MG: 81 TABLET ORAL at 08:15

## 2024-07-05 RX ADMIN — Medication 1000 UNITS: at 08:15

## 2024-07-05 ASSESSMENT — PAIN SCALES - GENERAL: PAINLEVEL_OUTOF10: 0

## 2024-07-05 NOTE — PROGRESS NOTES
modified independent  Comments: HOB raised, increased time and use of bed rail.       Transfers:  Sit to stand transfer:stand by assistance  Stand to sit transfer: stand by assistance  Car transfer: contact guard assistance  Toilet transfer: contact guard assistance  Toilet transfer equipment: standard bedside commode, grab bars, walker  Comments: per nurse practitioner-- requested therapy to perform/discuss car transfer. Took patient down to therapy gym and used the car simulator. Long discussion about get in and out the very tall truck vs low [place]. Patient able to perform the lower seat car with CGA, discussed that if patient is going home in the high truck, family would need to bring a step stool due to too high to sit down safely. Patient expressed understanding.      Functional Mobility  Functional Mobility Activity: to/from bathroom  Device Use: rolling walker  Required Assistance: stand by assistance  Comment: patient reports feeling much better this date  Balance:  Static Sitting Balance: good: independent with functional balance in unsupported position  Dynamic Sitting Balance: good: independent with functional balance in unsupported position  Static Standing Balance: fair (-): maintains balance at SBA with use of UE support  Dynamic Standing Balance: fair (-): maintains balance at CGA with use of UE support  Comments:    Other Therapeutic Interventions    Functional Outcomes  AM-PAC Inpatient Daily Activity Raw Score: 21                                    Cognition  WFL  Orientation:    alert and oriented x 4  Command Following:   WFL     Education  Barriers To Learning: none  Patient Education: patient educated on goals, OT role and benefits, plan of care, precautions, weight-bearing education, proper use of assistive device/equipment, energy conservation, transfer training, discharge recommendations  Learning Assessment:  patient verbalizes and demonstrates understanding    Assessment  Activity

## 2024-07-05 NOTE — PROGRESS NOTES
Shift assessment completed. Routine vitals obtained. Scheduled medications given. Patient is awake, alert and oriented. Patient denies pain and does not appear to be in distress, resting comfortably at this time. Call light within reach. Fall precautions in place. No further needs expressed.

## 2024-07-05 NOTE — CARE COORDINATION
Case Management -  Discharge Note      Patient Name: Manpreet Puckett                   YOB: 1934            Readmission Risk (Low < 19, Mod (19-27), High > 27): No data recorded  Current PCP: Valerie Win MD    ([facility]) Important Message from Medicare:    Date: n/a    PT AM-PAC: 18 /24  OT AM-PAC: 21 /24    Patient/patient representative has been educated on the benefits of HHC as well as the possible risks of declining recommended services. Patient/patient representative has acknowledged the information provided and decided on the following discharge plan. Patient/ patient representative has been provided freedom of choice regarding service provider, supported by basic dialogue that supports the patient's individualized plan of care/goals.    [facility]  [facility],   [place] [address]  Phone: [phone]  Fax: [phone]     Financial    Payor: BCBS MEDICARE / Plan: ANTHEM MEDIBLUE ESSENTIAL/PLUS / Product Type: *No Product type* /     Pharmacy:  Potential assistance Purchasing Medications: No  Meds-to-Beds request:        [id number] - [place], [place] - [address] [address] [facility] -  [phone] - F [phone]  [address] [facility] [address]  Phone: [phone] Fax: [phone]    [place] [address] [facility] -  [phone] - F [phone]  [address] [facility] [address]  Phone: [phone] Fax: [phone]      Notes:    Additional Case Management Notes: Patient will discharge home today with family. Therapy has recommended HHC and the patient is agreeable with no preference in agency. [facility] will follow on discharge. No further needs at this time.    Electronically signed by Azael Phillips on 7/5/24 at 2:29 PM EDT

## 2024-07-05 NOTE — PLAN OF CARE
Problem: Pain  Goal: Verbalizes/displays adequate comfort level or baseline comfort level  7/4/2024 2239 by Eve Herron RN  Outcome: Progressing     Problem: Safety - Adult  Goal: Free from fall injury  7/4/2024 2239 by Eve Herron, RN  Outcome: Progressing     Problem: ABCDS Injury Assessment  Goal: Absence of physical injury  7/4/2024 2239 by Eve Herron, RN  Outcome: Progressing

## 2024-07-05 NOTE — DISCHARGE INSTR - COC
Continuity of Care Form    Patient Name: Manpreet Puckett   :  1934  MRN:  [id number]    Admit date:  7/3/2024  Discharge date:  24    Code Status Order: Full Code   Advance Directives:     Admitting Physician:  Dallas Reyes DO  PCP: Valerie Win MD    Discharging Nurse: RAMONITA Villegas  Discharging Hospital Unit/Room#: 5TN-5563/5563-01  Discharging Unit Phone Number: [phone]    Emergency Contact:   Extended Emergency Contact Information  Primary Emergency Contact: Laura Guzman   [facility]  Home Phone: [phone]  Relation: Child  Secondary Emergency Contact: Byron Puckett  Home Phone: [phone]  Mobile Phone: [phone]  Relation: Child    Past Surgical History:  Past Surgical History:   Procedure Laterality Date    APPENDECTOMY      CATARACT EXTRACTION Right     COLONOSCOPY      SALPINGO-OOPHORECTOMY  2017    robotic, and hysteroscopy d&c, polypectomy     TOTAL HIP ARTHROPLASTY Right 2019    RIGHT HIP TOTAL ARTHROPLASTY performed by Dallas Torres MD at [facility] OR    TOTAL HIP ARTHROPLASTY Left        Immunization History:   Immunization History   Administered Date(s) Administered    Influenza Virus Vaccine 10/24/2003, 10/31/2006, 10/19/2007, 10/22/2009, 2010, 11/10/2011, 10/02/2013, 10/29/2014, 2015, 2016    Influenza, FLUCELVAX, (age 6 mo+), MDCK, PF, 0.5mL 2017    Pneumococcal, PCV-13, PREVNAR 13, (age 6w+), IM, 0.5mL 2016    Pneumococcal, PPSV23, PNEUMOVAX 23, (age 2y+), SC/IM, 0.5mL 2013, 2019    Td, unspecified formulation 2008       Active Problems:  Patient Active Problem List   Diagnosis Code    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    Essential hypertension I10    History of DVT (deep vein thrombosis) Z86.718    Stage 3a chronic kidney disease (HCC) N18.31    Seizure (HCC) R56.9    Fall W19.XXXA    Right hip pain M25.551    Hypercholesteremia E78.00       Isolation/Infection:    Isolation            No Isolation          Patient Infection Status       None to display            Nurse Assessment:  Last Vital Signs: /63   Pulse 78   Temp 98.5 °F (36.9 °C) (Oral)   Resp 16   Ht 1.651 m (5' 5\")   Wt 60.3 kg (133 lb)   SpO2 94%   BMI 22.13 kg/m²     Last documented pain score (0-10 scale): Pain Level: 0  Last Weight:   Wt Readings from Last 1 Encounters:   07/03/24 60.3 kg (133 lb)     Mental Status:  oriented and alert    IV Access:  - None    Nursing Mobility/ADLs:  Walking   Assisted  Transfer  Assisted  Bathing  Assisted  Dressing  Assisted  Toileting  Assisted  Feeding  Independent  Med Admin  Independent  Med Delivery   whole    Wound Care Documentation and Therapy:  Incision 05/01/19 Hip Right (Active)   Number of days: 1892        Elimination:  Continence:   Bowel: Yes  Bladder: Yes  Urinary Catheter: None   Colostomy/Ileostomy/Ileal Conduit: No       Date of Last BM:   No intake or output data in the 24 hours ending 07/05/24 1414  I/O last 3 completed shifts:  In: 480 [P.O.:480]  Out: -     Safety Concerns:     At Risk for Falls    Impairments/Disabilities:      None    Nutrition Therapy:  Current Nutrition Therapy:   - Oral Diet:  General    Routes of Feeding: Oral  Liquids: No Restrictions  Daily Fluid Restriction: no  Last Modified Barium Swallow with Video (Video Swallowing Test): not done    Treatments at the Time of Hospital Discharge:   Respiratory Treatments:   Oxygen Therapy:  is not on home oxygen therapy.  Ventilator:    - No ventilator support    Rehab Therapies: Physical Therapy and Occupational Therapy  Weight Bearing Status/Restrictions: No weight bearing restrictions  Other Medical Equipment (for information only, NOT a DME order):    Other Treatments:     Patient's personal belongings (please select all that are sent with patient):       RN SIGNATURE:  Electronically signed by Nelly Vogel RN on 7/5/24 at 2:32 PM EDT    CASE MANAGEMENT/SOCIAL WORK

## 2024-07-05 NOTE — PROGRESS NOTES
have 2-3 sessions per week of Physical Therapy at d/c to increase the patient's independence.  At this time, this patient demonstrates the endurance and safety to discharge home with home services and a follow up treatment frequency of 2-3x/wk.  Please see assessment section for further patient specific details.    If patient discharges prior to next session this note will serve as a discharge summary.  Please see below for the latest assessment towards goals.    HOME HEALTH CARE: LEVEL 3 SAFETY     - Initial home health evaluation to occur within 24-48 hours, in patient home   - Therapy evaluations in home within 24-48 hours of discharge; including DME and home safety   - Frontload therapy 5 days, then 3x a week   - Therapy to evaluate if patient has Home Health Aide needs for personal care   -  evaluation within 24-48 hours, includes evaluation of resources and insurance to determine [place], [place], LTC, and Medicaid options     DME Required For Discharge: rolling walker--patients owns from prior surgery   Precautions/Restrictions: high fall risk  Weight Bearing Restrictions: no restrictions  [] Right Upper Extremity  [] Left Upper Extremity [] Right Lower Extremity  [] Left Lower Extremity     Required Braces/Orthotics: no braces required   [] Right  [] Left  Positional Restrictions:no positional restrictions    Pre-Admission Information   Lives With: alone *pt is discharging directly to [facility] in [place], [place]*       Type of Home: house  Home Layout: one level, laundry in basement, pt goes to basement regularly - walk out basement  Home Access:  3 step to enter with handrail.  Handrails are located on one side. - this is garage entry and pt uses this regularly; 2 steps from front entry w/o handrails  Bathroom Layout: walk in shower  Bathroom Equipment:  handheld shower head; no shower chair-pt normally stands  Toilet Height: standard height  Home Equipment: rolling walker  Transfer Assistance:  assistance  Comments: transfers completed with use of the rolling walker  Ambulation:  Surface:level surface  Assistive Device: rolling walker  Assistance: contact guard assistance  Distance: 200'  Gait Mechanics: antalgic weight bearing on (R) hip, heavy weight bearing through (B) UE, decreased swing through on (L) LE  Comments:    Stair Mobility:  Stair mobility not completed on this date.  Comments:  Wheelchair Mobility:  No w/c mobility completed on this date.  Comments:  Balance:  Static Sitting Balance: fair (+): maintains balance at SBA/supervision without use of UE support  Dynamic Sitting Balance: fair (+): maintains balance at SBA/supervision without use of UE support  Static Standing Balance: fair (-): maintains balance at CGA with use of UE support  Dynamic Standing Balance: fair (-): maintains balance at CGA with use of UE support  Comments:    Other Therapeutic Interventions      Patient completes the following exercises with use of the rolling walker for stability and CGA    1) partial semi-tandem stance alternating leading LE x 10 reps  2) Mini-squats x 10 reps  3) heel raises x 10 reps  4) hip abduction x 10 reps   5) hip extension x 10 reps   6) hip flexion x 10 reps  7) alternating LE tap to 6'' step x 10 reps  8) addition of alternating lifting UE off of walker with exercise above x 10 reps   9) chest press, shoulder press, lateral twist of object with (B) UE in stance x 10 reps       Educated through the following HEP:    Exercises  - Seated Scapular Retraction  - 1 x daily - 7 x weekly - 3 sets - 10 reps  - Supine Hip Abduction  - 1 x daily - 7 x weekly - 3 sets - 10 reps  - Supine Heel Slide  - 1 x daily - 7 x weekly - 3 sets - 10 reps  - Supine Ankle Pumps  - 1 x daily - 7 x weekly - 3 sets - 10 reps  - Standing March with Counter Support  - 1 x daily - 7 x weekly - 3 sets - 10 reps  - Standing Hip Flexion with Counter Support  - 1 x daily - 7 x weekly - 3 sets - 10 reps  - Standing Hip

## 2024-07-05 NOTE — DISCHARGE SUMMARY
[facility]    Patient Demographics    Patient. Manpreet Puckett  Date of Birth. 4/11/1934  MRN. [id number]     Primary care provider. Valerie Wni MD  (Tel: [phone])    Admit date: 7/3/2024    Discharge date 7/5/2024  Note Date: 7/5/2024     Reason for Hospitalization.   Chief Complaint   Patient presents with    Fall     Pt via EMS from home, fell down hill yesterday, was on toilet early this morning and couldn't stand up off the toilet due to right hip pain         Significant Findings.   Principal Problem:    Fall  Active Problems:    Seizure (HCC)    Essential hypertension    History of DVT (deep vein thrombosis)    Right hip pain    Hypercholesteremia  Resolved Problems:    * No resolved hospital problems. *       Problems and results from this hospitalization that need follow up.  Follow up with your neurologist to discuss your previous medication     Significant test results and incidental findings.    IMPRESSION:  CT right hip   No acute abnormality in the pelvis or right femur.     Status post total hip arthroplasty.     Bilateral small-moderate gluteus medius insertional tears.     MRI right hip      IMPRESSION:  No acute abnormality in the pelvis or right femur.     Status post total hip arthroplasty.     Bilateral small-moderate gluteus medius insertional tears.     Invasive procedures and treatments.   None     Problem-based Hospital Course.  Pt admitted with right hip pain after having a mechanical fall in her yard ( was watering her flowers and slipped on the landscaping rocks)       Mechanical fall with right sided hip/ gluteal pain:  imaging studies reviewed in detail with patient.  She feels better and was walking in the halls with therapy and her walker.  She did not require any medications for pain. She was d/c home to her daughters house and will receive home care
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
## 2024-08-19 ENCOUNTER — OFFICE VISIT (OUTPATIENT)
Dept: ORTHOPEDIC SURGERY | Age: 89
End: 2024-08-19
Payer: MEDICARE

## 2024-08-19 DIAGNOSIS — M25.561 RIGHT KNEE PAIN, UNSPECIFIED CHRONICITY: Primary | ICD-10-CM

## 2024-08-19 PROCEDURE — 99214 OFFICE O/P EST MOD 30 MIN: CPT | Performed by: ORTHOPAEDIC SURGERY

## 2024-08-19 PROCEDURE — 1123F ACP DISCUSS/DSCN MKR DOCD: CPT | Performed by: ORTHOPAEDIC SURGERY

## 2024-08-19 PROCEDURE — 20610 DRAIN/INJ JOINT/BURSA W/O US: CPT | Performed by: ORTHOPAEDIC SURGERY

## 2024-08-19 RX ORDER — LIDOCAINE HYDROCHLORIDE 10 MG/ML
4 INJECTION, SOLUTION INFILTRATION; PERINEURAL ONCE
Status: COMPLETED | OUTPATIENT
Start: 2024-08-19 | End: 2024-08-19

## 2024-08-19 RX ORDER — TRIAMCINOLONE ACETONIDE 40 MG/ML
40 INJECTION, SUSPENSION INTRA-ARTICULAR; INTRAMUSCULAR ONCE
Status: COMPLETED | OUTPATIENT
Start: 2024-08-19 | End: 2024-08-19

## 2024-08-19 RX ADMIN — LIDOCAINE HYDROCHLORIDE 4 ML: 10 INJECTION, SOLUTION INFILTRATION; PERINEURAL at 14:41

## 2024-08-19 RX ADMIN — TRIAMCINOLONE ACETONIDE 40 MG: 40 INJECTION, SUSPENSION INTRA-ARTICULAR; INTRAMUSCULAR at 14:41

## 2024-08-20 RX ORDER — METHYLPREDNISOLONE 4 MG/1
TABLET ORAL
Qty: 21 KIT | Refills: 0 | Status: SHIPPED | OUTPATIENT
Start: 2024-08-20 | End: 2024-08-25

## 2024-08-20 NOTE — PROGRESS NOTES
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   We discussed the diagnosis and treatment options in detail with the patient.  Will proceed with an injection today of the knee we will see how much relief she gets  Additionally I will refer her for a right hip iliopsoas injection with Dr. SMITH.  I will see her 2 weeks later we will see how much relief she gets. Sent medrol dosepak.    Joint Injection: risks and benefits were discussed with the patient, after preparation of the injection site with alcohol, a combination of 1cc kenelog and 4cc marcaine totaling 5 cc was injected into the RIGHT KNEE joint for arthritis. The procedure was tolerated well without adverse reaction. The patient was counseled on potential reactions to the injection and given information on follow up and when to seek medical attention. The patient will monitor how long relief persists.

## 2024-08-21 ENCOUNTER — OFFICE VISIT (OUTPATIENT)
Dept: ORTHOPEDIC SURGERY | Age: 89
End: 2024-08-21

## 2024-08-21 VITALS — WEIGHT: 133 LBS | HEIGHT: 65 IN | BODY MASS INDEX: 22.16 KG/M2

## 2024-08-21 DIAGNOSIS — M70.71 ILIOPSOAS BURSITIS OF RIGHT HIP: ICD-10-CM

## 2024-08-21 DIAGNOSIS — M25.851 HIP IMPINGEMENT SYNDROME, RIGHT: ICD-10-CM

## 2024-08-21 DIAGNOSIS — Z96.641 S/P TOTAL RIGHT HIP ARTHROPLASTY: Primary | ICD-10-CM

## 2024-08-21 RX ORDER — LIDOCAINE HYDROCHLORIDE 10 MG/ML
5 INJECTION, SOLUTION INFILTRATION; PERINEURAL ONCE
Status: COMPLETED | OUTPATIENT
Start: 2024-08-21 | End: 2024-08-21

## 2024-08-21 RX ORDER — BUPIVACAINE HYDROCHLORIDE 2.5 MG/ML
3 INJECTION, SOLUTION INFILTRATION; PERINEURAL ONCE
Status: COMPLETED | OUTPATIENT
Start: 2024-08-21 | End: 2024-08-21

## 2024-08-21 RX ORDER — BETAMETHASONE SODIUM PHOSPHATE AND BETAMETHASONE ACETATE 3; 3 MG/ML; MG/ML
6 INJECTION, SUSPENSION INTRA-ARTICULAR; INTRALESIONAL; INTRAMUSCULAR; SOFT TISSUE ONCE
Status: COMPLETED | OUTPATIENT
Start: 2024-08-21 | End: 2024-08-21

## 2024-08-21 RX ADMIN — BUPIVACAINE HYDROCHLORIDE 7.5 MG: 2.5 INJECTION, SOLUTION INFILTRATION; PERINEURAL at 11:25

## 2024-08-21 RX ADMIN — BETAMETHASONE SODIUM PHOSPHATE AND BETAMETHASONE ACETATE 6 MG: 3; 3 INJECTION, SUSPENSION INTRA-ARTICULAR; INTRALESIONAL; INTRAMUSCULAR; SOFT TISSUE at 11:18

## 2024-08-21 RX ADMIN — LIDOCAINE HYDROCHLORIDE 5 ML: 10 INJECTION, SOLUTION INFILTRATION; PERINEURAL at 11:24

## 2024-08-28 NOTE — PROGRESS NOTES
Not on file     Unable to get utilities: Not on file        Review of Symptoms:    Pertinent items are noted in HPI   10 point review of systems negative except as mentioned in HPI      Vital Signs:    There were no vitals filed for this visit.     General Exam:     Constitutional: Patient is adequately groomed with no evidence of malnutrition  Mental Status: The patient is oriented to time, place and person.  The patient's mood and affect are appropriate.  Vascular: Examination reveals no swelling or calf tenderness.  Peripheral pulses are palpable and 2+.    Lymphatics: no lymphadenopathy of the inguinal region or lower extremity      Physical Exam: right hip   General: Thin body habitus no acute distress   Primary Exam:    Inspection: No deformity atrophy appreciable effusion      Palpation: No focal tenderness      Skin: There are no rashes, ulcerations or lesions.      Gait: Nonantalgic      Neurovascular - non focal and intact       Additional Comments:        Additional Examinations:                    Office Imaging Results/Procedures PerformedToday:            Office Procedures:     Orders Placed This Encounter   Procedures    DRAIN/INJECT LARGE JOINT/BURSA    US GUIDED NEEDLE PLACEMENT     Standing Status:   Future     Number of Occurrences:   1     Standing Expiration Date:   8/21/2025     Order Specific Question:   Reason for exam:     Answer:   CSI     Ultrasound-guided iliopsoas bursa CSI-right  Logiq ultrasound 5 MHz    Patient position supine examination the curvilinear transducer was utilized.  The femoral head component of the femoral prosthesis was identified and coronal oblique and in short axis and from this position in short axis the transducer was translated cephalad and the iliopectineal eminence was identified in the iliopsoas muscle tendon complex was identified and the femoral neurovascular bundle was identified.    From a lateral approach after sterile technique topical acetic was  note was dictated with voice recognition software. Though review and correction are routine, we apologize for any errors.\"

## 2024-09-30 ENCOUNTER — OFFICE VISIT (OUTPATIENT)
Dept: ORTHOPEDIC SURGERY | Age: 89
End: 2024-09-30
Payer: MEDICARE

## 2024-09-30 DIAGNOSIS — M17.11 PRIMARY OSTEOARTHRITIS OF RIGHT KNEE: ICD-10-CM

## 2024-09-30 DIAGNOSIS — M70.71 ILIOPSOAS BURSITIS OF RIGHT HIP: Primary | ICD-10-CM

## 2024-09-30 PROCEDURE — 99213 OFFICE O/P EST LOW 20 MIN: CPT | Performed by: ORTHOPAEDIC SURGERY

## 2024-09-30 PROCEDURE — 1123F ACP DISCUSS/DSCN MKR DOCD: CPT | Performed by: ORTHOPAEDIC SURGERY

## 2024-09-30 NOTE — PROGRESS NOTES
Patient: Manpreet Puckett  : 1934    MRN: [id number]    Date of Visit: 24    Attending Physician: Chris Hyman MD    History of Present Illness  Ms.. Puckett is a very pleasant 89 y.o. patient with a several month history of right knee pain and right hip pain.  She reportedly had a fall afterwards she had right hip pain she underwent both a CT scan as well as an MRI of the hip which were unremarkable she does have a history of bilateral total hip arthroplasty on the right side she does complain of some discomfort in the thigh particularly worse with forward flexion of the hip    Additionally regarding the knee she complains of global knee pain worse with increased activity she does have discomfort when she gets down on the knee.    24: She returns today to follow-up regarding the right iliopsoas injection with Dr. Malik she reports she is doing quite well has having minimal pain in the hip and is quite happy with the outcome.    PMH/PSH:  Past Medical History:   Diagnosis Date    Arthritis     Edema 2019    lower legs and feet    Hypercholesteremia     Hypertension     Seizure (HCC)     had few episodes of probable nocturnal seizures (2015, 2018, 3/2019), negative eeg and MRI     Patient Active Problem List   Diagnosis    Status post total replacement of right hip    Essential hypertension    History of DVT (deep vein thrombosis)    Stage 3a chronic kidney disease (HCC)    Seizure (HCC)    Right hip pain    Hypercholesteremia     Past Surgical History:   Procedure Laterality Date    APPENDECTOMY      CATARACT EXTRACTION Right     COLONOSCOPY      SALPINGO-OOPHORECTOMY  2017    robotic, and hysteroscopy d&c, polypectomy     TOTAL HIP ARTHROPLASTY Right 2019    RIGHT HIP TOTAL ARTHROPLASTY performed by Dallas Torres MD at [facility] OR    TOTAL HIP ARTHROPLASTY Left        MEDS:  Scheduled Meds:  Continuous Infusions:  PRN Meds:  Current

## 2025-01-18 DIAGNOSIS — I10 ESSENTIAL HYPERTENSION: Chronic | ICD-10-CM

## 2025-01-20 RX ORDER — LISINOPRIL 20 MG/1
20 TABLET ORAL DAILY
Qty: 90 TABLET | Refills: 0 | Status: SHIPPED | OUTPATIENT
Start: 2025-01-20

## 2025-02-10 ENCOUNTER — TELEPHONE (OUTPATIENT)
Dept: FAMILY MEDICINE CLINIC | Age: 89
End: 2025-02-10

## 2025-02-10 DIAGNOSIS — I10 ESSENTIAL HYPERTENSION: ICD-10-CM

## 2025-02-10 DIAGNOSIS — Z00.00 WELL ADULT EXAM: Primary | ICD-10-CM

## 2025-02-10 DIAGNOSIS — Z00.00 WELL ADULT EXAM: ICD-10-CM

## 2025-02-10 NOTE — TELEPHONE ENCOUNTER
Pt requesting labs--says she spoke with the call center and the lady had a very thick accent and had a hard time understanding her but did assure her, her labs would be ordered           Please order asap as pt is currently at the lab and call her once ordered. [facility] 2/12       [phone]

## 2025-02-10 NOTE — TELEPHONE ENCOUNTER
Patient called back and is requesting the following labs to be order so she can go get them drawn.     Comprehensive Metabolic Panel   Lipid Panel   CBC     Please give the patient a call as soon as the orders are in place per patient request.   [phone] she stated she will be driving and unable to answer the phone to please leave her a VM if she is unable to

## 2025-02-11 LAB
ALBUMIN SERPL-MCNC: 4.3 G/DL (ref 3.4–5)
ALBUMIN/GLOB SERPL: 1.7 {RATIO} (ref 1.1–2.2)
ALP SERPL-CCNC: 50 U/L (ref 40–129)
ALT SERPL-CCNC: 15 U/L (ref 10–40)
ANION GAP SERPL CALCULATED.3IONS-SCNC: 9 MMOL/L (ref 3–16)
AST SERPL-CCNC: 21 U/L (ref 15–37)
BILIRUB SERPL-MCNC: 0.3 MG/DL (ref 0–1)
BUN SERPL-MCNC: 15 MG/DL (ref 7–20)
CALCIUM SERPL-MCNC: 9.7 MG/DL (ref 8.3–10.6)
CHLORIDE SERPL-SCNC: 104 MMOL/L (ref 99–110)
CHOLEST SERPL-MCNC: 271 MG/DL (ref 0–199)
CO2 SERPL-SCNC: 28 MMOL/L (ref 21–32)
CREAT SERPL-MCNC: 0.9 MG/DL (ref 0.6–1.2)
DEPRECATED RDW RBC AUTO: 15.2 % (ref 12.4–15.4)
GFR SERPLBLD CREATININE-BSD FMLA CKD-EPI: 60 ML/MIN/{1.73_M2}
GLUCOSE SERPL-MCNC: 88 MG/DL (ref 70–99)
HCT VFR BLD AUTO: 40.2 % (ref 36–48)
HDLC SERPL-MCNC: 76 MG/DL (ref 40–60)
HGB BLD-MCNC: 13.4 G/DL (ref 12–16)
LDLC SERPL CALC-MCNC: 174 MG/DL
MCH RBC QN AUTO: 30.1 PG (ref 26–34)
MCHC RBC AUTO-ENTMCNC: 33.2 G/DL (ref 31–36)
MCV RBC AUTO: 90.6 FL (ref 80–100)
PLATELET # BLD AUTO: 246 K/UL (ref 135–450)
PMV BLD AUTO: 9.1 FL (ref 5–10.5)
POTASSIUM SERPL-SCNC: 5.5 MMOL/L (ref 3.5–5.1)
PROT SERPL-MCNC: 6.8 G/DL (ref 6.4–8.2)
RBC # BLD AUTO: 4.44 M/UL (ref 4–5.2)
SODIUM SERPL-SCNC: 141 MMOL/L (ref 136–145)
TRIGL SERPL-MCNC: 107 MG/DL (ref 0–150)
VLDLC SERPL CALC-MCNC: 21 MG/DL
WBC # BLD AUTO: 5.7 K/UL (ref 4–11)

## 2025-02-12 ENCOUNTER — OFFICE VISIT (OUTPATIENT)
Dept: FAMILY MEDICINE CLINIC | Age: 89
End: 2025-02-12
Payer: MEDICARE

## 2025-02-12 VITALS
OXYGEN SATURATION: 98 % | BODY MASS INDEX: 21.99 KG/M2 | WEIGHT: 132 LBS | DIASTOLIC BLOOD PRESSURE: 74 MMHG | TEMPERATURE: 97.8 F | SYSTOLIC BLOOD PRESSURE: 162 MMHG | HEART RATE: 90 BPM | HEIGHT: 65 IN

## 2025-02-12 DIAGNOSIS — E87.5 HYPERKALEMIA: ICD-10-CM

## 2025-02-12 DIAGNOSIS — R56.9 SEIZURE (HCC): ICD-10-CM

## 2025-02-12 DIAGNOSIS — H61.21 IMPACTED CERUMEN OF RIGHT EAR: ICD-10-CM

## 2025-02-12 DIAGNOSIS — Z00.00 WELL ADULT EXAM: Primary | ICD-10-CM

## 2025-02-12 DIAGNOSIS — I10 ESSENTIAL HYPERTENSION: Chronic | ICD-10-CM

## 2025-02-12 DIAGNOSIS — N18.31 STAGE 3A CHRONIC KIDNEY DISEASE (HCC): ICD-10-CM

## 2025-02-12 PROCEDURE — 69210 REMOVE IMPACTED EAR WAX UNI: CPT | Performed by: FAMILY MEDICINE

## 2025-02-12 PROCEDURE — G0439 PPPS, SUBSEQ VISIT: HCPCS | Performed by: FAMILY MEDICINE

## 2025-02-12 PROCEDURE — 99214 OFFICE O/P EST MOD 30 MIN: CPT | Performed by: FAMILY MEDICINE

## 2025-02-12 PROCEDURE — 1123F ACP DISCUSS/DSCN MKR DOCD: CPT | Performed by: FAMILY MEDICINE

## 2025-02-12 PROCEDURE — 1159F MED LIST DOCD IN RCRD: CPT | Performed by: FAMILY MEDICINE

## 2025-02-12 PROCEDURE — G2211 COMPLEX E/M VISIT ADD ON: HCPCS | Performed by: FAMILY MEDICINE

## 2025-02-12 RX ORDER — ACETAMINOPHEN 160 MG
2000 TABLET,DISINTEGRATING ORAL DAILY
COMMUNITY

## 2025-02-12 RX ORDER — MAGNESIUM GLUCONATE 27 MG(500)
500 TABLET ORAL 2 TIMES DAILY
COMMUNITY

## 2025-02-12 RX ORDER — ASPIRIN 81 MG/1
81 TABLET ORAL DAILY
COMMUNITY

## 2025-02-12 RX ORDER — LISINOPRIL 20 MG/1
20 TABLET ORAL DAILY
Qty: 90 TABLET | Refills: 3 | Status: SHIPPED | OUTPATIENT
Start: 2025-02-12

## 2025-02-12 SDOH — ECONOMIC STABILITY: FOOD INSECURITY: WITHIN THE PAST 12 MONTHS, THE FOOD YOU BOUGHT JUST DIDN'T LAST AND YOU DIDN'T HAVE MONEY TO GET MORE.: NEVER TRUE

## 2025-02-12 SDOH — ECONOMIC STABILITY: FOOD INSECURITY: WITHIN THE PAST 12 MONTHS, YOU WORRIED THAT YOUR FOOD WOULD RUN OUT BEFORE YOU GOT MONEY TO BUY MORE.: NEVER TRUE

## 2025-02-12 ASSESSMENT — PATIENT HEALTH QUESTIONNAIRE - PHQ9
2. FEELING DOWN, DEPRESSED OR HOPELESS: NOT AT ALL
SUM OF ALL RESPONSES TO PHQ9 QUESTIONS 1 & 2: 0
SUM OF ALL RESPONSES TO PHQ QUESTIONS 1-9: 0
SUM OF ALL RESPONSES TO PHQ QUESTIONS 1-9: 0
1. LITTLE INTEREST OR PLEASURE IN DOING THINGS: NOT AT ALL
SUM OF ALL RESPONSES TO PHQ QUESTIONS 1-9: 0
SUM OF ALL RESPONSES TO PHQ QUESTIONS 1-9: 0

## 2025-02-12 NOTE — PATIENT INSTRUCTIONS
Personalized Preventative Plan for Manpreet Puckett    Medicare offers a range of preventative health benefits. Some of the tests and screenings are paid in full while others may be subject to a deductible, co-insurance and/or copay. Some of these benefits include a comprehensive review of your medical history including lifestyle, illnesses that may run in your family and various assessments and screenings as appropriate. After reviewing your medical record and screening and assessments performed today your provider may have ordered/recommended immunizations, labs, imaging and/or referrals for you. A list of these orders as well as your Preventative Care list are included within your After Visit Summary for your review.       --Bring in copy of living will and healthcare power of  for your chart.      --Check with the pharmacy about getting the Tdap (tetanus, diptheria and pertussis) vaccine.      --Check with pharmacy about getting the shingles vaccine, Shingrix (not Zostavax)      --Make appt to follow up with Dr. Hagan      Monitor blood pressure 2-3 times a week and drop off readings for review in 1 month.   Goal BP is <130/80 for many people, good control is <120/80    Need a new cuff? Check this website to make sure your BP cuff has been validated for accuracy:    www.validatebp.org

## 2025-02-12 NOTE — PROGRESS NOTES
MEDICARE ANNUAL WELLNESS VISIT    Patient is here for their Medicare Annual Wellness Visit     Last eye exam: may 2024   Last dental exam: August 2024  Exercise:  Yes , walking  Do you eat balanced/healthy meals regularly? Yes - lots of fruits and veggies    How would you rate your overall health? : Very good            2/12/2025     3:14 PM 1/8/2024    11:03 AM 11/14/2022     9:17 AM 10/8/2021    11:14 AM 6/9/2020     1:10 PM 4/23/2019     1:27 PM 4/6/2018     9:10 AM   Fall Risk   Do you feel unsteady or are you worried about falling?  no no no       2 or more falls in past year? no no no no no no no   Fall with injury in past year? no no no no no no no           2/12/2025     3:14 PM 1/8/2024    11:04 AM 11/14/2022     9:17 AM 10/8/2021    11:14 AM 6/9/2020     1:10 PM 2/7/2019     3:58 PM 4/6/2018     9:07 AM   PHQ Scores   PHQ2 Score 0 0 1 2 2 2 2   PHQ9 Score 0 0 1 2 2 2 2         Do you always wear a seat belt in the car?: Yes      Have you noted any problems with hearing?: No  Have you noted any vision problems?: No  Do you have concerns about your sexual health?: no  In the past month how much has pain been an issue for you?:  Not at all  In the past month have you had issues with anxiety, loneliness, irritability or fatigue:  Not at all    Do you take opioid medications even sometimes? No (if using assess risk and whether other treatments would be beneficial)    Living Will and/or Healthcare POA: Yes,   Copy requested      Healthcare Decision Maker:    Primary Decision Maker: Byron Puckett - Child - [phone]    Secondary Decision Maker: Christiano Puckett    Supplemental (Other) Decision Maker: Laura Guzman - Child - [phone]           Who lives at home with you: self   Do you have any pets? none  Do you have any services coming to your home (meals on wheels, home health, etc) ?: no      Do you need help with:  Using the phone:  No  Bathing: No  Dressing:  No  Toileting: No  Transportation:

## 2025-02-17 NOTE — PROGRESS NOTES
Ear Irrigation Procedure Note    Ear irrigation procedure was performed using an Elephant ear to the right ear(s) for cerumen impaction. The remaining wax and debris was removed with curette instrumentation. The procedure was successful. The patient had no complications.     Note per Clarisa Cardoso MA and scribe with corrections and edits per Valerie Win MD. I agree with the entirety of note and I was present and performed history and physical. I also confirm that the note above accurately reflects all work, treatment, procedures, and medical decision making performed by me, Valerie Win MD   Scribe attestation

## 2025-02-25 DIAGNOSIS — E87.5 HYPERKALEMIA: ICD-10-CM

## 2025-02-26 LAB
ANION GAP SERPL CALCULATED.3IONS-SCNC: 8 MMOL/L (ref 3–16)
BUN SERPL-MCNC: 13 MG/DL (ref 7–20)
CALCIUM SERPL-MCNC: 9.6 MG/DL (ref 8.3–10.6)
CHLORIDE SERPL-SCNC: 105 MMOL/L (ref 99–110)
CO2 SERPL-SCNC: 30 MMOL/L (ref 21–32)
CREAT SERPL-MCNC: 0.7 MG/DL (ref 0.6–1.2)
GFR SERPLBLD CREATININE-BSD FMLA CKD-EPI: 82 ML/MIN/{1.73_M2}
GLUCOSE SERPL-MCNC: 96 MG/DL (ref 70–99)
POTASSIUM SERPL-SCNC: 4.7 MMOL/L (ref 3.5–5.1)
SODIUM SERPL-SCNC: 143 MMOL/L (ref 136–145)

## 2025-03-13 ENCOUNTER — OFFICE VISIT (OUTPATIENT)
Dept: FAMILY MEDICINE CLINIC | Age: 89
End: 2025-03-13
Payer: MEDICARE

## 2025-03-13 VITALS
DIASTOLIC BLOOD PRESSURE: 72 MMHG | TEMPERATURE: 98.6 F | SYSTOLIC BLOOD PRESSURE: 148 MMHG | HEART RATE: 76 BPM | OXYGEN SATURATION: 95 %

## 2025-03-13 DIAGNOSIS — K52.9 ACUTE GASTROENTERITIS: Primary | ICD-10-CM

## 2025-03-13 PROCEDURE — 1123F ACP DISCUSS/DSCN MKR DOCD: CPT | Performed by: FAMILY MEDICINE

## 2025-03-13 PROCEDURE — 99213 OFFICE O/P EST LOW 20 MIN: CPT | Performed by: FAMILY MEDICINE

## 2025-03-13 RX ORDER — ONDANSETRON 4 MG/1
4 TABLET, FILM COATED ORAL 3 TIMES DAILY PRN
Qty: 15 TABLET | Refills: 0 | Status: SHIPPED | OUTPATIENT
Start: 2025-03-13

## 2025-03-13 ASSESSMENT — ENCOUNTER SYMPTOMS
CRAMPS: 1
DIARRHEA: 1
BACK PAIN: 0
RHINORRHEA: 0
CONSTIPATION: 0
NAUSEA: 1
CHEST TIGHTNESS: 0
SINUS PAIN: 0
FLATUS: 0
SHORTNESS OF BREATH: 0
BELCHING: 0

## 2025-03-13 NOTE — PROGRESS NOTES
SUBJECTIVE:    Manpreet Puckett is a 89 y.o. female who presents for a follow up visit.    Chief Complaint   Patient presents with    Nausea     Chest and abdominal \"pressure\"  with frequent formed bm starting after midnight, nausea, unable to eat no emesis. Has not had any antacids.        Abdominal Cramping  This is a new problem. The current episode started today. The onset quality is sudden. Episode frequency: Multiple episodes of pressure and stools but not loose or watery. The pain is located in the generalized abdominal region. The pain is mild. The quality of the pain is cramping and a sensation of fullness. The abdominal pain radiates to the chest. Associated symptoms include diarrhea and nausea. Pertinent negatives include no arthralgias, belching, constipation, dysuria, fever, flatus, frequency, headaches or weight loss. Nothing aggravates the pain. The pain is relieved by Nothing. She has tried nothing for the symptoms.   Patient is feeling better and her exam was benign.  This is most likely a viral etiology.  I did offer something for her frequent stools but she feels that things are better and she does not want to take anything for the bowel movements.  She would like to have something on hand for nausea.    Patient's medications, allergies, past medical,surgical, social and family histories were reviewed and updated as appropriate.     Past Medical History:   Diagnosis Date    Arthritis     Edema 04/22/2019    lower legs and feet    Hypercholesteremia     Hypertension     Seizure (HCC)     had few episodes of probable nocturnal seizures (7/2015, 8/2018, 3/2019), negative eeg and MRI     Past Surgical History:   Procedure Laterality Date    APPENDECTOMY  1949    CATARACT EXTRACTION Right 2009    COLONOSCOPY      SALPINGO-OOPHORECTOMY  12/11/2017    robotic, and hysteroscopy d&c, polypectomy     TOTAL HIP ARTHROPLASTY Right 05/01/2019    RIGHT HIP TOTAL ARTHROPLASTY performed by Dallas Daily

## 2025-04-29 ENCOUNTER — OFFICE VISIT (OUTPATIENT)
Dept: ENT CLINIC | Age: 89
End: 2025-04-29
Payer: MEDICARE

## 2025-04-29 ENCOUNTER — TELEPHONE (OUTPATIENT)
Dept: ENT CLINIC | Age: 89
End: 2025-04-29

## 2025-04-29 VITALS
WEIGHT: 131 LBS | HEIGHT: 65 IN | BODY MASS INDEX: 21.83 KG/M2 | HEART RATE: 87 BPM | DIASTOLIC BLOOD PRESSURE: 74 MMHG | OXYGEN SATURATION: 98 % | SYSTOLIC BLOOD PRESSURE: 161 MMHG

## 2025-04-29 DIAGNOSIS — R04.0 EPISTAXIS: Primary | ICD-10-CM

## 2025-04-29 DIAGNOSIS — T45.7X5A ADVERSE EFFECT OF ANTIPLATELET AGENT, INITIAL ENCOUNTER: ICD-10-CM

## 2025-04-29 DIAGNOSIS — J31.0 CHRONIC RHINITIS: ICD-10-CM

## 2025-04-29 PROCEDURE — 31238 NSL/SINS NDSC SRG NSL HEMRRG: CPT | Performed by: STUDENT IN AN ORGANIZED HEALTH CARE EDUCATION/TRAINING PROGRAM

## 2025-04-29 PROCEDURE — 1123F ACP DISCUSS/DSCN MKR DOCD: CPT | Performed by: STUDENT IN AN ORGANIZED HEALTH CARE EDUCATION/TRAINING PROGRAM

## 2025-04-29 PROCEDURE — 1159F MED LIST DOCD IN RCRD: CPT | Performed by: STUDENT IN AN ORGANIZED HEALTH CARE EDUCATION/TRAINING PROGRAM

## 2025-04-29 PROCEDURE — 99203 OFFICE O/P NEW LOW 30 MIN: CPT | Performed by: STUDENT IN AN ORGANIZED HEALTH CARE EDUCATION/TRAINING PROGRAM

## 2025-04-29 NOTE — TELEPHONE ENCOUNTER
Pt calling, has a spot on the outside of her nose that she thinks is from the cauterizing solution that Dr Overton used. She wants to know how long it will stay on there. Please call to advise.

## 2025-04-29 NOTE — PROGRESS NOTES
applicable    SUBJECTIVE/OBJECTIVE:  HPI    History of Present Illness  The patient is a 89-year-old female who presents for evaluation of nosebleeds. She is accompanied by her daughter.    Nosebleeds  - Experienced an episode of epistaxis yesterday, precipitated by nasal blowing  - Bleeding originated from the right nostril  - Managed with Kleenex and a cold washcloth applied to the nose  - Bleeding ceased after approximately 35 to 40 minutes  - Severe episode of epistaxis occurred three weeks prior, characterized by the expulsion of clots  - No prior history of nosebleeds  - Reports sensation of a new scab forming in her nose over the past three weeks  - No history of hypertension or anticoagulant therapy, except for low-dose aspirin  - No history of nasal or sinus surgery  - No family history of bleeding disorders  - On low-dose aspirin since 2001, as recommended by her cardiologist, despite normal stress test results  - Did not take aspirin yesterday and does not plan to take it today    Irregular Heartbeats  - Reports occasional irregular heartbeats    Chronic Rhinitis  - Reports chronic rhinitis.  This been ongoing for many years.  Has not tried medications for this no procedures    Supplemental information: None    FAMILY HISTORY  She reports a family history of irregular heartbeat.    MEDICATIONS  Low dose aspirin.       REVIEW OF SYSTEMS  The following systems were reviewed and revealed the following in addition to any already discussed in the HPI:    PHYSICAL EXAM    GENERAL: No acute distress, alert and oriented, no hoarseness, strong voice  EYES: EOMI, Anti-icteric  HENT:   Head: Normocephalic and atraumatic.   Face:  Symmetric, facial nerve intact  Right Ear: Normal external ear, normal external auditory canal, intact tympanic membrane with normal mobility and aerated middle ear  Left Ear: Normal external ear, normal external auditory canal, intact tympanic membrane with normal mobility and aerated

## 2025-05-27 ENCOUNTER — OFFICE VISIT (OUTPATIENT)
Dept: SURGERY | Age: 89
End: 2025-05-27
Payer: MEDICARE

## 2025-05-27 VITALS — BODY MASS INDEX: 21.8 KG/M2 | WEIGHT: 131 LBS

## 2025-05-27 DIAGNOSIS — K62.89 RECTAL DISCOMFORT: Primary | ICD-10-CM

## 2025-05-27 PROCEDURE — 99203 OFFICE O/P NEW LOW 30 MIN: CPT | Performed by: SURGERY

## 2025-05-27 PROCEDURE — 1159F MED LIST DOCD IN RCRD: CPT | Performed by: SURGERY

## 2025-05-27 PROCEDURE — 1123F ACP DISCUSS/DSCN MKR DOCD: CPT | Performed by: SURGERY

## 2025-05-27 NOTE — PROGRESS NOTES
[facility] [place] [facility] COLORECTAL SURGERY  [address] [place] [address]  [id number]  [place] [address]  Dept: [phone]  Dept Fax: [phone]  Loc: [phone]    Visit Date: 5/27/2025    Manpreet Puckett is a 89 y.o. female who presents today for: New Patient (NP- Possible hemorrhoids Patient denies pain, patient denies bleeding, patient denies constipation. Patient hasn't had a Colonoscopy in 12 years. Patients she feels something isn't sure if it a Hemorrhoid. Feeling like this for about 2 months.)      HPI:       Manpreet Puckett is a 89 y.o. female who is accompanied by her daughter today in the office.  She has a feeling that something is hanging out of her anus.  Denies any bleeding.  Denies pain.  Last colonoscopy 12 years ago.  Denies family history of colorectal cancer.    Past Medical History:   Diagnosis Date    Arthritis     Edema 04/22/2019    lower legs and feet    Hypercholesteremia     Hypertension     Seizure (HCC)     had few episodes of probable nocturnal seizures (7/2015, 8/2018, 3/2019), negative eeg and MRI       Past Surgical History:   Procedure Laterality Date    APPENDECTOMY  1949    CATARACT EXTRACTION Right 2009    COLONOSCOPY      SALPINGO-OOPHORECTOMY  12/11/2017    robotic, and hysteroscopy d&c, polypectomy     TOTAL HIP ARTHROPLASTY Right 05/01/2019    RIGHT HIP TOTAL ARTHROPLASTY performed by Dallas Torres MD at [facility] OR    TOTAL HIP ARTHROPLASTY Left 2008       Cancer-related family history is not on file.    Social History:   Social History     Tobacco Use    Smoking status: Never    Smokeless tobacco: Never   Substance Use Topics    Alcohol use: No      Tobacco cessation counseling provided as appropriate.    No colonoscopy on file   No cologuard on file  No FIT/FOBT on file   No flexible sigmoidoscopy on file      Objective:     Physical Exam   Wt 59.4 kg (131 lb)   BMI 21.80 kg/m²   Constitutional: Appears well-developed

## (undated) DEVICE — DUAL CUT SAGITTAL BLADE

## (undated) DEVICE — HIGH FLOW TIP

## (undated) DEVICE — INTENDED TO AID IN THE PASSING OF SUTURES THROUGH BONE AND SOFT TISSUE DURING ORTHOPEDIC SURGERY: Brand: HOFFEE SUTURE RETRIEVER

## (undated) DEVICE — NEEDLE SUT SZ 4 MAYO CATGUT 1/2 CIR TAPR PNT DISP

## (undated) DEVICE — SUTURE MCRYL SZ 4-0 L27IN ABSRB UD L19MM PS-2 1/2 CIR PRIM Y426H

## (undated) DEVICE — HANDPIECE SUCTION TUBING INTERPULSE 10FT

## (undated) DEVICE — PROTECTOR ULN NRV PUR FOAM HK LOOP STRP ANATOMICALLY

## (undated) DEVICE — 3M™ WARMING BLANKET, UPPER BODY, 10 PER CASE, 42268: Brand: BAIR HUGGER™

## (undated) DEVICE — SURE SET-DOUBLE BASIN-LF: Brand: MEDLINE INDUSTRIES, INC.

## (undated) DEVICE — PEEL-AWAY HOOD: Brand: FLYTE, SURGICOOL

## (undated) DEVICE — SST TWIST DRILL, STANDARD, 3.2MM DIA. X 127MM: Brand: MICROAIRE®

## (undated) DEVICE — SOLUTION IRRIG 3000ML LAC R FLX CONT

## (undated) DEVICE — GLOVE SURG SZ 8 L12IN FNGR THK87MIL WHT LTX FREE

## (undated) DEVICE — COVER LT HNDL BLU PLAS

## (undated) DEVICE — CHLORAPREP 26ML ORANGE

## (undated) DEVICE — NEEDLE HYPO 22GA L1 1/2IN PIVOTING SHLD FOR LUERLOCK SYR

## (undated) DEVICE — Z DISCONTINUED USE 2744636  DRESSING AQUACEL 14 IN ALG W3.5XL14IN POLYUR FLM CVR W/ HYDRCOLL

## (undated) DEVICE — PACK PROCEDURE SURG TOT HIP

## (undated) DEVICE — SUTURE VCRL SZ 2-0 L18IN ABSRB UD CT-1 L36MM 1/2 CIR J839D

## (undated) DEVICE — TIP IRR FEM CNL DISP FOR BTTRY PWD INTERPULSE

## (undated) DEVICE — SUTURE ETHBND EXCEL SZ 2 L30IN NONABSORBABLE GRN L40MM V-37 MX69G

## (undated) DEVICE — PLATE ES AD W 9FT CRD 2

## (undated) DEVICE — STRIP,CLOSURE,WOUND,MEDI-STRIP,1/2X4: Brand: MEDLINE

## (undated) DEVICE — GARMENT,MEDLINE,DVT,INT,CALF,MED, GEN2: Brand: MEDLINE

## (undated) DEVICE — REFLECTION TAPERED HOLE COVER INSERTER: Brand: REFLECTION

## (undated) DEVICE — 450 ML BOTTLE OF 0.05% CHLORHEXIDINE GLUCONATE IN 99.95% STERILE WATER FOR IRRIGATION, USP AND APPLICATOR.: Brand: IRRISEPT ANTIMICROBIAL WOUND LAVAGE

## (undated) DEVICE — SUTURE VCRL SZ 0 L18IN ABSRB UD L36MM CT-1 1/2 CIR J840D

## (undated) DEVICE — GOWN,SIRUS,POLYRNF,BRTHSLV,XLN/XXL,18/CS: Brand: MEDLINE

## (undated) DEVICE — Z CONVERTED USE 2271043 CONTAINER SPEC COLL 4OZ SCR ON LID PEEL PCH

## (undated) DEVICE — ELECTRODE BLDE L6.5IN CAUT EXT DISP

## (undated) DEVICE — TUBING, SUCTION, 1/4" X 12', STRAIGHT: Brand: MEDLINE

## (undated) DEVICE — ORTHO PRE OP PACK: Brand: MEDLINE INDUSTRIES, INC.

## (undated) DEVICE — E-Z CLEAN, NON-STICK, PTFE COATED, ELECTROSURGICAL BLADE ELECTRODE, 2.5 INCH (6.35 CM): Brand: EZ CLEAN

## (undated) DEVICE — YANKAUER SUCTION INSTRUMENT WITHOUT CONTROL VENT, OPEN TIP, CLEAR: Brand: YANKAUER

## (undated) DEVICE — TURNOVER KIT RM INF CTRL TECH

## (undated) DEVICE — REFLECTION FLEXIBLE DRILL 25MM: Brand: REFLECTION